# Patient Record
Sex: FEMALE | Race: WHITE | Employment: OTHER | URBAN - METROPOLITAN AREA
[De-identification: names, ages, dates, MRNs, and addresses within clinical notes are randomized per-mention and may not be internally consistent; named-entity substitution may affect disease eponyms.]

---

## 2017-01-18 ENCOUNTER — OFFICE VISIT (OUTPATIENT)
Dept: CARDIOLOGY CLINIC | Age: 77
End: 2017-01-18

## 2017-01-18 VITALS
WEIGHT: 119.8 LBS | SYSTOLIC BLOOD PRESSURE: 150 MMHG | HEIGHT: 63 IN | BODY MASS INDEX: 21.23 KG/M2 | HEART RATE: 66 BPM | RESPIRATION RATE: 16 BRPM | DIASTOLIC BLOOD PRESSURE: 72 MMHG

## 2017-01-18 DIAGNOSIS — I25.10 CORONARY ARTERY DISEASE INVOLVING NATIVE CORONARY ARTERY OF NATIVE HEART WITHOUT ANGINA PECTORIS: ICD-10-CM

## 2017-01-18 DIAGNOSIS — I65.29 STENOSIS OF CAROTID ARTERY, UNSPECIFIED LATERALITY: Primary | ICD-10-CM

## 2017-01-18 DIAGNOSIS — I49.3 PVC (PREMATURE VENTRICULAR CONTRACTION): ICD-10-CM

## 2017-01-18 DIAGNOSIS — I10 ESSENTIAL HYPERTENSION: ICD-10-CM

## 2017-01-18 RX ORDER — GEMFIBROZIL 600 MG/1
600 TABLET, FILM COATED ORAL 2 TIMES DAILY
Qty: 180 TAB | Refills: 11 | Status: SHIPPED | OUTPATIENT
Start: 2017-01-18 | End: 2017-10-18 | Stop reason: ALTCHOICE

## 2017-01-18 NOTE — MR AVS SNAPSHOT
Visit Information Date & Time Provider Department Dept. Phone Encounter #  
 1/18/2017 10:40 AM Eliza Harvey MD [facility] Jaren Tenorio [phone] [id number] Your Appointments 2/6/2017  8:30 AM  
ROUTINE CARE with MD Ofe Lopez [address]) Appt Note: bp chol ifg weight GERD  
 [address] [facility] [address] [address] [phone]  
  
   
 [address] [facility] [address] 4/19/2017  8:40 AM  
ESTABLISHED PATIENT with Eliza Harvey MD  
[facility] ([address]) Appt Note: 3 mfu  
 [address]  [address] [place] [address]  
[address] [address] Upcoming Health Maintenance Date Due Pneumococcal 65+ High/Highest Risk (1 of 2 - PCV13) 12/21/2004 INFLUENZA AGE 9 TO ADULT 8/1/2016 GLAUCOMA SCREENING Q2Y 11/26/2016 MEDICARE YEARLY EXAM 2/4/2017 DTaP/Tdap/Td series (2 - Td) 1/19/2025 COLONOSCOPY 1/28/2025 Allergies as of 1/18/2017  Review Complete On: 1/18/2017 By: Jason Nieto Severity Noted Reaction Type Reactions Lisinopril  09/27/2015    Other (comments) High K Losartan  02/19/2016    Other (comments) High K Current Immunizations  Reviewed on 8/4/2016 Name Date Pneumococcal Vaccine (Unspecified Type)  Deferred (Patient Refused) Tdap 1/19/2015 11:00 AM  
  
 Not reviewed this visit You Were Diagnosed With   
  
 Codes Comments Stenosis of carotid artery, unspecified laterality    -  Primary ICD-10-CM: I65.29 ICD-9-CM: 433.10 PVC (premature ventricular contraction)     ICD-10-CM: I49.3 ICD-9-CM: 427.69 Coronary artery disease involving native coronary artery of native heart without angina pectoris     ICD-10-CM: I25.10 ICD-9-CM: 414.01 Essential hypertension     ICD-10-CM: I10 
ICD-9-CM: 401.9 Vitals BP Pulse Resp Height(growth percentile) Weight(growth percentile) BMI  
 150/72 (BP 1 Location: Right arm, BP Patient Position: Sitting) 66 16 5' 3\" (1.6 m) 119 lb 12.8 oz (54.3 kg) 21.22 kg/m2 OB Status Smoking Status Postmenopausal Never Smoker Vitals History BMI and BSA Data Body Mass Index Body Surface Area  
 21.22 kg/m 2 1.55 m 2 Preferred Pharmacy Pharmacy Name Phone [facility] [place], [place] - [address] [place], [place] [address]maverick [phone] Your Updated Medication List  
  
   
This list is accurate as of: 1/18/17 11:41 AM.  Always use your most recent med list. amLODIPine 5 mg tablet Commonly known as:  Stewart Presser Take 1 Tab by mouth daily. aspirin 81 mg tablet Take 81 mg by mouth daily. carvedilol 6.25 mg tablet Commonly known as:  Rabia Buster Take 1 Tab by mouth two (2) times a day. clopidogrel 75 mg Tab Commonly known as:  PLAVIX Take 1 Tab by mouth daily. colesevelam 625 mg tablet Commonly known as:  WELCHOL  
TAKE TWO TABLETS BY MOUTH TWICE DAILY WITH FOOD  
  
 famotidine 20 mg tablet Commonly known as:  PEPCID  
TAKE ONE TABLET BY MOUTH TWICE DAILY  
  
 fenofibrate nanocrystallized 145 mg tablet Commonly known as:  TRICOR  
TAKE ONE TABLET BY MOUTH DAILY gemfibrozil 600 mg tablet Commonly known as:  LOPID Take 1 Tab by mouth two (2) times a day. latanoprost 0.005 % ophthalmic solution Commonly known as:  Deyanne Pace Administer 1 Drop to both eyes nightly. losartan 25 mg tablet Commonly known as:  COZAAR Take 0.5 Tabs by mouth daily. nicotinic acid 500 mg tablet Commonly known as:  NIACIN Take 500 mg by mouth daily (before breakfast). rosuvastatin 20 mg tablet Commonly known as:  CRESTOR Take 1 Tab by mouth nightly. VITAMIN D3 2,000 unit Tab Generic drug:  cholecalciferol (vitamin D3) Take 1 Tab by mouth daily. Prescriptions Sent to Pharmacy Refills  
 gemfibrozil (LOPID) 600 mg tablet 11 Sig: Take 1 Tab by mouth two (2) times a day. Class: Normal  
 Pharmacy: [address] #: [phone] Route: Oral  
  
We Performed the Following AMB POC EKG ROUTINE W/ 12 LEADS, INTER & REP [42179 CPT(R)] LIPID PANEL [71117 CPT(R)] MAGNESIUM [id number] CPT(R)] METABOLIC PANEL, COMPREHENSIVE [94183 CPT(R)] Please provide this summary of care documentation to your next provider. Your primary care clinician is listed as Jason Sinha. If you have any questions after today's visit, please call [phone].

## 2017-01-18 NOTE — PROGRESS NOTES
[facility]: Liv Daniel  [phone]    HPI: Kerry Hinton, a 67y.o. year-old who presents for follow up regarding her CAD. Bp is running a bit higher and she may be eating more salt but she is still walking and feeling good with that. Insurance wants to have her change to gemfibrozil. Discussed side effects, she will trial it and we will check her labs in 3 mos after she switches. Also will reeval her bp in three mos after she gets back to normal eating and cuts her salt. BP going up, 150 and 160 at times, will watch fornow and she will work on her diet, Recently has been more like 130-140 which is ok. Weight is stable. Her cholesterol looks a lot better but the welchol is very very expensive and she is taking one twice a day. Will recheck her labs and K and mag in 4/16. She continues to walk regularly without symptoms and feels well with it. Walks 30 min a day 5-6 days a week. No blood in the stool or in the urine    Old HDL labs:    HDL 75 Tg 78  High ApoB, LDLp SdLDL lpa >225!!!!!  Nl Lpla2, bnp 194, Normal aspirin works  Insulin normal, glucose 109, Homocysteine 19  Vit D 32  LFT/CMp normal    Assessment/Plan:  1. HTN-high today on losartan, coreg, amlodipine. Does not want diuretic due to already being dizzy. 2. CAD- stable, s/p TRANG RCA and midLAD 3/11   -Cath 8/12 with proximal RCA stent,  No chest pain now  -lifelong plavix and aspirin(her decision after discussion of risks and benefits)  3. Palpitations- better on coreg, still v-bigemeny at last holter  4. Hyperlipidemia- crestor 40, welchol, trilipix. Check for samples today  -no Zetia due to cost.   5. Anxiety- better since she stopped checking her bp. 6. Reflux- on Nexium, and stable  7. Vit D deficiency 2000u/day, continue, at goal now    8. Impaired glucose tolerance- a1C  Still good  9. CKD Stage 3-stable now, improved, seen by Dr. Mauro Chapman.   -hyperkalemia in the past improved with stopping spironolactone, 4.8 now, olegario cut losartan for same  10. Mild to moderate mitral regurgitation- by TTE 11/15    Echo 11/15 EF 60%, mild TR, PAP 25, mild-mod MR  Carotid US: Mod LICA plaque <63%, no evidence of Subclavian Steal.  Stress nuc: 1/14 normal, EF 69%  Echo 9/12 EF 60, mod MR, mild TR  Cath 2012 RCA stent  Fhx no early cad  Soc no tob, no etoh    She  has a past medical history of CAD (coronary artery disease); CKD (chronic kidney disease), stage III (9/26/2014); HTN (hypertension) (10/1/2010); Hyperlipidemia; and Hypertension. Cardiovascular ROS: positive for - chest pain  Respiratory ROS: negative for - cough, hemoptysis, pleuritic pain or shortness of breath  Neurological ROS: no TIA or stroke symptoms  All other systems negative except as above. PE  Vitals:    01/18/17 1054 01/18/17 1101   BP: 150/76 150/72   Pulse: 66    Resp: 16    Weight: 119 lb 12.8 oz (54.3 kg)    Height: 5' 3\" (1.6 m)       Body mass index is 21.22 kg/(m^2).    General appearance - alert, well appearing, and in no distress  Mental status - affect appropriate to mood  Eyes - sclera anicteric, moist mucous membranes  Neck - supple, no significant adenopathy  Lymphatics - no  lymphadenopathy  Chest - clear to auscultation, no wheezes, rales or rhonchi  Heart - normal rate, regular rhythm, normal S1, S2, no murmurs, rubs, clicks or gallops  Abdomen - soft, nontender, nondistended, no masses or organomegaly  Back exam - full range of motion, no tenderness  Neurological - cranial nerves II through XII grossly intact, no focal deficit  Musculoskeletal - no muscular tenderness noted, normal strength  Extremities - peripheral pulses normal, no pedal edema  Skin - normal coloration  no rashes    Recent Labs:  Lab Results   Component Value Date/Time    Cholesterol, total 187 08/15/2016 08:02 AM    HDL Cholesterol 78 08/15/2016 08:02 AM    LDL, calculated 97 08/15/2016 08:02 AM    Triglyceride 60 08/15/2016 08:02 AM    CHOL/HDL Ratio 3.1 09/28/2011 05:00 AM       Lab Results   Component Value Date/Time    Creatinine 1.16 08/15/2016 08:02 AM       Lab Results   Component Value Date/Time    BUN 24 08/15/2016 08:02 AM       Lab Results   Component Value Date/Time    Potassium 4.8 08/15/2016 08:02 AM       Lab Results   Component Value Date/Time    Hemoglobin A1c 6.1 08/15/2016 08:02 AM       Lab Results   Component Value Date/Time    HGB 12.8 06/13/2014 11:17 AM       Lab Results   Component Value Date/Time    PLATELET 423 24/88/8549 11:17 AM         Reviewed:  Past Medical History   Diagnosis Date    CAD (coronary artery disease)     CKD (chronic kidney disease), stage III 9/26/2014     Followed by Dr. 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  fenofibrate nanocrystallized (TRICOR) 145 mg tablet TAKE ONE TABLET BY MOUTH DAILY    rosuvastatin (CRESTOR) 20 mg tablet Take 1 Tab by mouth nightly.  colesevelam (WELCHOL) 625 mg tablet TAKE TWO TABLETS BY MOUTH TWICE DAILY WITH FOOD    losartan (COZAAR) 25 mg tablet Take 0.5 Tabs by mouth daily.  clopidogrel (PLAVIX) 75 mg tablet Take 1 Tab by mouth daily.  amLODIPine (NORVASC) 5 mg tablet Take 1 Tab by mouth daily.  carvedilol (COREG) 6.25 mg tablet Take 1 Tab by mouth two (2) times a day.  famotidine (PEPCID) 20 mg tablet TAKE ONE TABLET BY MOUTH TWICE DAILY    cholecalciferol, vitamin D3, (VITAMIN D3) 2,000 unit tab Take 1 Tab by mouth daily.  latanoprost (XALATAN) 0.005 % ophthalmic solution Administer 1 Drop to both eyes nightly.  nicotinic acid (NIACIN) 500 mg tablet Take 500 mg by mouth daily (before breakfast).  aspirin 81 mg tablet Take 81 mg by mouth daily. No current facility-administered medications for this visit.           Surinder Medrano MD  [facility] [place]  [address] [facility] [address]  [address]

## 2017-01-19 ENCOUNTER — TELEPHONE (OUTPATIENT)
Dept: CARDIOLOGY CLINIC | Age: 77
End: 2017-01-19

## 2017-01-19 NOTE — TELEPHONE ENCOUNTER
Patient called regarding new medication prescribed at last office visit. She is concerned about interactions.  Please give her a call at [phone]

## 2017-01-19 NOTE — TELEPHONE ENCOUNTER
Patient identified with 2 identifiers  Returned patient phone call, she is concerned about the cholesterol medications she is taking and the amount of mg in each medication. I explained that the comparisons are in different drug classes, so can't be compared across drug classes. I did review with her that if she has side effects with new medication, she needs to call back and we will review her medication. She states understanding.

## 2017-03-06 ENCOUNTER — OFFICE VISIT (OUTPATIENT)
Dept: INTERNAL MEDICINE CLINIC | Age: 77
End: 2017-03-06

## 2017-03-06 VITALS
HEIGHT: 63 IN | SYSTOLIC BLOOD PRESSURE: 152 MMHG | OXYGEN SATURATION: 96 % | DIASTOLIC BLOOD PRESSURE: 62 MMHG | TEMPERATURE: 97.9 F | RESPIRATION RATE: 16 BRPM | WEIGHT: 121 LBS | HEART RATE: 67 BPM | BODY MASS INDEX: 21.44 KG/M2

## 2017-03-06 DIAGNOSIS — R73.01 IFG (IMPAIRED FASTING GLUCOSE): ICD-10-CM

## 2017-03-06 DIAGNOSIS — K21.9 GASTROESOPHAGEAL REFLUX DISEASE WITHOUT ESOPHAGITIS: ICD-10-CM

## 2017-03-06 DIAGNOSIS — I25.10 CORONARY ARTERY DISEASE INVOLVING NATIVE CORONARY ARTERY OF NATIVE HEART WITHOUT ANGINA PECTORIS: ICD-10-CM

## 2017-03-06 DIAGNOSIS — I10 ESSENTIAL HYPERTENSION: Primary | ICD-10-CM

## 2017-03-06 RX ORDER — PREDNISOLONE ACETATE 10 MG/ML
1 SUSPENSION/ DROPS OPHTHALMIC DAILY
COMMUNITY
Start: 2017-02-27 | End: 2017-06-01

## 2017-03-06 RX ORDER — KETOROLAC TROMETHAMINE 5 MG/ML
SOLUTION OPHTHALMIC
COMMUNITY
Start: 2017-02-27 | End: 2018-02-01 | Stop reason: ALTCHOICE

## 2017-03-06 RX ORDER — OFLOXACIN 3 MG/ML
SOLUTION/ DROPS OPHTHALMIC
COMMUNITY
Start: 2017-02-27 | End: 2017-06-01

## 2017-03-06 NOTE — PROGRESS NOTES
HPI  Ms. Minerva Cano is a 67y.o. year old female, she is seen today for pre op prior to cataract surgery with Dr. Belem Bustillos. In usual state of health today. Says she may also get stent for glaucoma left eye. No cp, sob. Walks daily for 30 min without difficulty. METS > 4. No f/c, cough, n/v, constipation, diarrhea. No edema. No dizziness or lightheadedness. BP at home has been 133-148/74-86. HR 60-65. Chief Complaint   Patient presents with    Pre-op Exam     Room 1// cataract surgery 3/16/17 and 3/23/17// NON fasting     Annual Wellness Visit        Prior to Admission medications    Medication Sig Start Date End Date Taking? Authorizing Provider   gemfibrozil (LOPID) 600 mg tablet Take 1 Tab by mouth two (2) times a day. 1/18/17  Yes Graham Dukes MD   rosuvastatin (CRESTOR) 20 mg tablet Take 1 Tab by mouth nightly. 10/19/16  Yes Sugar Wilkes MD   [facility]) 625 mg tablet TAKE TWO TABLETS BY MOUTH TWICE DAILY WITH FOOD 7/27/16  Yes Graham Dukes MD   losartan (COZAAR) 25 mg tablet Take 0.5 Tabs by mouth daily. 7/27/16  Yes Graham Dukes MD   clopidogrel (PLAVIX) 75 mg tablet Take 1 Tab by mouth daily. 4/28/16  Yes Graham Dukes MD   amLODIPine (NORVASC) 5 mg tablet Take 1 Tab by mouth daily. 4/28/16  Yes Graham Dukes MD   carvedilol (COREG) 6.25 mg tablet Take 1 Tab by mouth two (2) times a day. 4/4/16  Yes Dorothy Dee NP   famotidine (PEPCID) 20 mg tablet TAKE ONE TABLET BY MOUTH TWICE DAILY 3/31/16  Yes Sugar Wilkes MD   cholecalciferol, vitamin D3, (VITAMIN D3) 2,000 unit tab Take 1 Tab by mouth daily. Yes Historical Provider   latanoprost (XALATAN) 0.005 % ophthalmic solution Administer 1 Drop to both eyes nightly. 12/6/13  Yes Historical Provider   nicotinic acid (NIACIN) 500 mg tablet Take 500 mg by mouth daily (before breakfast). Yes Historical Provider   aspirin 81 mg tablet Take 81 mg by mouth daily. Yes Historical Provider   prednisoLONE acetate (PRED FORTE) 1 % ophthalmic suspension  2/27/17   Historical Provider   ofloxacin (FLOXIN) 0.3 % ophthalmic solution  2/27/17   Historical Provider   ketorolac (ACULAR) 0.5 % ophthalmic solution  2/27/17   Historical Provider         Allergies   Allergen Reactions    Lisinopril Other (comments)     High K         REVIEW OF SYSTEMS:  Per HPI    PHYSICAL EXAM:  Visit Vitals    /62    Pulse 67    Temp 97.9 °F (36.6 °C)    Resp 16    Ht 5' 3\" (1.6 m)    Wt 121 lb (54.9 kg)    SpO2 96%    BMI 21.43 kg/m2     Constitutional: Appears well-developed and well-nourished. No distress. HENT:   Head: Normocephalic and atraumatic. Eyes: No scleral icterus. Cardiovascular: Normal S1/S2, regular rhythm. No murmurs, rubs, or gallops. Pulmonary/Chest: Effort normal and breath sounds normal. No respiratory distress. No wheezes, rhonchi, or rales. Ext: No edema. Neurological: Alert. Psychiatric: Normal mood and affect. Behavior is normal.     Lab Results   Component Value Date/Time    Sodium 141 08/15/2016 08:02 AM    Potassium 4.8 08/15/2016 08:02 AM    Chloride 102 08/15/2016 08:02 AM    CO2 24 08/15/2016 08:02 AM    Anion gap 9 01/17/2014 05:49 PM    Glucose 105 08/15/2016 08:02 AM    BUN 24 08/15/2016 08:02 AM    Creatinine 1.16 08/15/2016 08:02 AM    BUN/Creatinine ratio 21 08/15/2016 08:02 AM    GFR est AA 53 08/15/2016 08:02 AM    GFR est non-AA 46 08/15/2016 08:02 AM    Calcium 9.3 08/15/2016 08:02 AM    Bilirubin, total 0.3 08/15/2016 08:02 AM    AST (SGOT) 19 08/15/2016 08:02 AM    Alk.  phosphatase 57 08/15/2016 08:02 AM    Protein, total 6.4 08/15/2016 08:02 AM    Albumin 4.3 08/15/2016 08:02 AM    Globulin 4.1 01/17/2014 05:49 PM    A-G Ratio 2.0 08/15/2016 08:02 AM    ALT (SGPT) 12 08/15/2016 08:02 AM     Lab Results   Component Value Date/Time    Hemoglobin A1c 6.1 08/15/2016 08:02 AM    Hemoglobin A1c 5.9 02/09/2016 09:27 AM    Hemoglobin A1c 6.1 08/05/2015 11:06 AM      Lab Results   Component Value Date/Time    Cholesterol, total 187 08/15/2016 08:02 AM    HDL Cholesterol 78 08/15/2016 08:02 AM    LDL, calculated 97 08/15/2016 08:02 AM    VLDL, calculated 12 08/15/2016 08:02 AM    Triglyceride 60 08/15/2016 08:02 AM    CHOL/HDL Ratio 3.1 09/28/2011 05:00 AM          ASSESSMENT/PLAN  Ever Burger was seen today for pre-op exam and annual wellness visit. Diagnoses and all orders for this visit:    Based on ACC/AHA guidelines patient may proceed to OR for cataract surgery. If plavix/asa need to be stopped may be stopped temporarily as patient is several years s/p stent placement. Essential hypertension    Coronary artery disease involving native coronary artery of native heart without angina pectoris    Gastroesophageal reflux disease without esophagitis    IFG (impaired fasting glucose)        Keep already scheduled follow up. Health Maintenance Due   Topic Date Due    GLAUCOMA SCREENING Q2Y  11/26/2016    MEDICARE YEARLY EXAM  02/04/2017        Follow-up Disposition: Not on File       Reviewed plan of care. Patient has provided input and agrees with goals. The nurse provided the patient and/or family with advanced directive information if needed and encouraged the patient to provide a copy to the office when available.

## 2017-03-06 NOTE — MR AVS SNAPSHOT
Visit Information Date & Time Provider Department Dept. Phone Encounter #  
 3/6/2017  1:35 PM MD Kenzie Brown [phone] [id number] Follow-up Instructions Routing History Your Appointments 4/19/2017  8:40 AM  
ESTABLISHED PATIENT with Charlotte Kowalski MD  
[facility] ([address]) Appt Note: 3 mfu  
 [address] [place] [address]  
[address] [place] [address]  
  
    
 6/1/2017  2:30 PM  
ROUTINE CARE with MD Kenzie Brown [address]) Appt Note: bp chol ifg weight GERD; bp chol ifg weight GERD/$0CP KMP 1/19/17  
 [address] [address] [phone]  
  
   
 [address] [facility] [address] Upcoming Health Maintenance Date Due  
 GLAUCOMA SCREENING Q2Y 11/26/2016 MEDICARE YEARLY EXAM 2/4/2017 Pneumococcal 65+ High/Highest Risk (2 of 2 - PPSV23) 5/1/2017 DTaP/Tdap/Td series (2 - Td) 1/19/2025 COLONOSCOPY 1/28/2025 Allergies as of 3/6/2017  Review Complete On: 3/6/2017 By: Abraham Rosales MD  
  
 Severity Noted Reaction Type Reactions Lisinopril  09/27/2015    Other (comments) High K Losartan  02/19/2016    Other (comments) High K Current Immunizations  Reviewed on 3/6/2017 Name Date Pneumococcal Vaccine (Unspecified Type)  Deferred (Patient Refused) Tdap 1/19/2015 11:00 AM  
  
 Reviewed by Abraham Rosales MD on 3/6/2017 at  2:01 PM  
You Were Diagnosed With   
  
 Codes Comments Essential hypertension    -  Primary ICD-10-CM: I10 
ICD-9-CM: 401.9 Coronary artery disease involving native coronary artery of native heart without angina pectoris     ICD-10-CM: I25.10 ICD-9-CM: 414.01 Gastroesophageal reflux disease without esophagitis     ICD-10-CM: K21.9 ICD-9-CM: 530.81   
 IFG (impaired fasting glucose)     ICD-10-CM: R73.01 
ICD-9-CM: 790.21 Vitals BP Pulse Temp Resp Height(growth percentile) Weight(growth percentile) 152/62 67 97.9 °F (36.6 °C) 16 5' 3\" (1.6 m) 121 lb (54.9 kg) SpO2 BMI OB Status Smoking Status 96% 21.43 kg/m2 Postmenopausal Never Smoker Vitals History BMI and BSA Data Body Mass Index Body Surface Area  
 21.43 kg/m 2 1.56 m 2 Preferred Pharmacy Pharmacy Name Phone [facility] [place], [place] - [address] [place] [address] [place] [phone] Your Updated Medication List  
  
   
This list is accurate as of: 3/6/17  2:19 PM.  Always use your most recent med list. amLODIPine 5 mg tablet Commonly known as:  Anup Haven Take 1 Tab by mouth daily. aspirin 81 mg tablet Take 81 mg by mouth daily. carvedilol 6.25 mg tablet Commonly known as:  Zack Peat Take 1 Tab by mouth two (2) times a day. clopidogrel 75 mg Tab Commonly known as:  PLAVIX Take 1 Tab by mouth daily. colesevelam 625 mg tablet Commonly known as:  WELCHOL  
TAKE TWO TABLETS BY MOUTH TWICE DAILY WITH FOOD  
  
 famotidine 20 mg tablet Commonly known as:  PEPCID  
TAKE ONE TABLET BY MOUTH TWICE DAILY gemfibrozil 600 mg tablet Commonly known as:  LOPID Take 1 Tab by mouth two (2) times a day.  
  
 ketorolac 0.5 % ophthalmic solution Commonly known as:  ACULAR  
  
 latanoprost 0.005 % ophthalmic solution Commonly known as:  Darcy Correa Administer 1 Drop to both eyes nightly. losartan 25 mg tablet Commonly known as:  COZAAR Take 0.5 Tabs by mouth daily. nicotinic acid 500 mg tablet Commonly known as:  NIACIN Take 500 mg by mouth daily (before breakfast). ofloxacin 0.3 % ophthalmic solution Commonly known as:  FLOXIN  
  
 prednisoLONE acetate 1 % ophthalmic suspension Commonly known as:  PRED FORTE  
  
 rosuvastatin 20 mg tablet Commonly known as:  CRESTOR  
 Take 1 Tab by mouth nightly. VITAMIN D3 2,000 unit Tab Generic drug:  cholecalciferol (vitamin D3) Take 1 Tab by mouth daily. Please provide this summary of care documentation to your next provider. Your primary care clinician is listed as Jason Sinha. If you have any questions after today's visit, please call [phone].

## 2017-03-06 NOTE — PROGRESS NOTES
Patient received paperwork for advance directive during previous visit but has not completed at this time     Reviewed record In preparation for visit and have obtained necessary documentation      1. Have you been to the ER, urgent care clinic since your last visit? Hospitalized since your last visit? NO    2. Have you seen or consulted any other health care providers outside of the [address] [place] [address] since your last visit? Include any pap smears or colon screening.  NO

## 2017-04-03 RX ORDER — FAMOTIDINE 20 MG/1
TABLET, FILM COATED ORAL
Qty: 60 TAB | Refills: 0 | Status: SHIPPED | OUTPATIENT
Start: 2017-04-03 | End: 2017-04-24 | Stop reason: SDUPTHER

## 2017-04-14 ENCOUNTER — HOSPITAL ENCOUNTER (OUTPATIENT)
Dept: LAB | Age: 77
Discharge: HOME | End: 2017-04-14
Payer: MEDICARE

## 2017-04-14 PROCEDURE — 83735 ASSAY OF MAGNESIUM: CPT

## 2017-04-14 PROCEDURE — 80061 LIPID PANEL: CPT

## 2017-04-14 PROCEDURE — 36415 COLL VENOUS BLD VENIPUNCTURE: CPT

## 2017-04-14 PROCEDURE — 80053 COMPREHEN METABOLIC PANEL: CPT

## 2017-04-15 LAB
ALBUMIN SERPL-MCNC: 4.3 G/DL (ref 3.5–4.8)
ALBUMIN/GLOB SERPL: 1.9 {RATIO} (ref 1.2–2.2)
ALP SERPL-CCNC: 101 IU/L (ref 39–117)
ALT SERPL-CCNC: 17 IU/L (ref 0–32)
AST SERPL-CCNC: 19 IU/L (ref 0–40)
BILIRUB SERPL-MCNC: 0.5 MG/DL (ref 0–1.2)
BUN SERPL-MCNC: 22 MG/DL (ref 8–27)
BUN/CREAT SERPL: 28 (ref 12–28)
CALCIUM SERPL-MCNC: 9.8 MG/DL (ref 8.7–10.3)
CHLORIDE SERPL-SCNC: 105 MMOL/L (ref 96–106)
CHOLEST SERPL-MCNC: 214 MG/DL (ref 100–199)
CO2 SERPL-SCNC: 19 MMOL/L (ref 18–29)
CREAT SERPL-MCNC: 0.79 MG/DL (ref 0.57–1)
GLOBULIN SER CALC-MCNC: 2.3 G/DL (ref 1.5–4.5)
GLUCOSE SERPL-MCNC: 103 MG/DL (ref 65–99)
HDLC SERPL-MCNC: 108 MG/DL
INTERPRETATION, 910389: NORMAL
LDLC SERPL CALC-MCNC: 97 MG/DL (ref 0–99)
MAGNESIUM SERPL-MCNC: 1.9 MG/DL (ref 1.6–2.3)
POTASSIUM SERPL-SCNC: 5.3 MMOL/L (ref 3.5–5.2)
PROT SERPL-MCNC: 6.6 G/DL (ref 6–8.5)
SODIUM SERPL-SCNC: 144 MMOL/L (ref 134–144)
TRIGL SERPL-MCNC: 47 MG/DL (ref 0–149)
VLDLC SERPL CALC-MCNC: 9 MG/DL (ref 5–40)

## 2017-04-19 ENCOUNTER — OFFICE VISIT (OUTPATIENT)
Dept: CARDIOLOGY CLINIC | Age: 77
End: 2017-04-19

## 2017-04-19 VITALS
HEART RATE: 66 BPM | DIASTOLIC BLOOD PRESSURE: 64 MMHG | BODY MASS INDEX: 21.55 KG/M2 | WEIGHT: 121.6 LBS | HEIGHT: 63 IN | RESPIRATION RATE: 16 BRPM | SYSTOLIC BLOOD PRESSURE: 120 MMHG

## 2017-04-19 DIAGNOSIS — E78.5 OTHER AND UNSPECIFIED HYPERLIPIDEMIA: ICD-10-CM

## 2017-04-19 DIAGNOSIS — I10 ESSENTIAL HYPERTENSION: ICD-10-CM

## 2017-04-19 DIAGNOSIS — I49.3 PVC (PREMATURE VENTRICULAR CONTRACTION): ICD-10-CM

## 2017-04-19 DIAGNOSIS — I25.10 CORONARY ARTERY DISEASE INVOLVING NATIVE CORONARY ARTERY OF NATIVE HEART WITHOUT ANGINA PECTORIS: ICD-10-CM

## 2017-04-19 DIAGNOSIS — I65.29 STENOSIS OF CAROTID ARTERY, UNSPECIFIED LATERALITY: Primary | ICD-10-CM

## 2017-04-19 RX ORDER — CLOPIDOGREL BISULFATE 75 MG/1
75 TABLET ORAL DAILY
Qty: 90 TAB | Refills: 3 | Status: SHIPPED | OUTPATIENT
Start: 2017-04-19 | End: 2018-04-02 | Stop reason: SDUPTHER

## 2017-04-19 RX ORDER — AMLODIPINE BESYLATE 5 MG/1
5 TABLET ORAL DAILY
Qty: 90 TAB | Refills: 3 | Status: SHIPPED | OUTPATIENT
Start: 2017-04-19 | End: 2018-04-18 | Stop reason: SDUPTHER

## 2017-04-19 NOTE — PROGRESS NOTES
[facility]: Vani Soto  [phone]    HPI: Mihaela Mata, a 67y.o. year-old who presents for follow up regarding her CAD. Feels well, BP looks great. Eating better, less salt, walking more. Had ome joint stiffness and cut gemfibrozil to once a day, labs look good. K is 5.3, she will recheck that with Dr. Moody Amato in three months. She continues to walk regularly without symptoms and feels well with it. Walks 30 min a day 5-6 days a week. No blood in the stool or in the urine    Old HDL labs:    HDL 75 Tg 78  High ApoB, LDLp SdLDL lpa >225!!!!!  Nl Lpla2, bnp 194, Normal aspirin works  Insulin normal, glucose 109, Homocysteine 19  Vit D 32  LFT/CMp normal    Assessment/Plan:  1. HTN-high today on losartan, coreg, amlodipine. Does not want diuretic due to already being dizzy.   -watch K on losartan  2. CAD- stable, s/p TRANG RCA and midLAD 3/11   -Cath 8/12 with proximal RCA stent,  No chest pain now  -lifelong plavix and aspirin(her decision after discussion of risks and benefits)  3. Palpitations- better on coreg, still v-bigemeny at last holter  4. Hyperlipidemia- crestor 40, welchol, trilipix. Check for samples today  -no Zetia due to cost.   5. Anxiety- better since she stopped checking her bp. 6. Reflux- on Nexium, and stable  7. Vit D deficiency 2000u/day, continue, at goal now    8. Impaired glucose tolerance- a1C  Still good  9. CKD Stage 3-stable now, improved, seen by Dr. Daphne Pineda. -hyperkalemia in the past improved with stopping spironolactone, 4.8 now, also cut losartan for same  10.  Mild to moderate mitral regurgitation- by TTE 11/15    Echo 11/15 EF 60%, mild TR, PAP 25, mild-mod MR  Carotid US: Mod LICA plaque <05%, no evidence of Subclavian Steal.  Stress nuc: 1/14 normal, EF 69%  Echo 9/12 EF 60, mod MR, mild TR  Cath 2012 RCA stent  Fhx no early cad  Soc no tob, no etoh    She  has a past medical history of CAD (coronary artery disease); CKD (chronic kidney disease), stage III (9/26/2014); HTN (hypertension) (10/1/2010); Hyperlipidemia; and Hypertension. Cardiovascular ROS: positive for - chest pain  Respiratory ROS: negative for - cough, hemoptysis, pleuritic pain or shortness of breath  Neurological ROS: no TIA or stroke symptoms  All other systems negative except as above. PE  Vitals:    04/19/17 0859   BP: 120/64   Pulse: 66   Resp: 16   Weight: 121 lb 9.6 oz (55.2 kg)   Height: 5' 3\" (1.6 m)      Body mass index is 21.54 kg/(m^2).    General appearance - alert, well appearing, and in no distress  Mental status - affect appropriate to mood  Eyes - sclera anicteric, moist mucous membranes  Neck - supple, no significant adenopathy  Lymphatics - no  lymphadenopathy  Chest - clear to auscultation, no wheezes, rales or rhonchi  Heart - normal rate, regular rhythm, normal S1, S2, no murmurs, rubs, clicks or gallops  Abdomen - soft, nontender, nondistended, no masses or organomegaly  Back exam - full range of motion, no tenderness  Neurological - cranial nerves II through XII grossly intact, no focal deficit  Musculoskeletal - no muscular tenderness noted, normal strength  Extremities - peripheral pulses normal, no pedal edema  Skin - normal coloration  no rashes    Recent Labs:  Lab Results   Component Value Date/Time    Cholesterol, total 214 04/14/2017 09:32 AM    HDL Cholesterol 108 04/14/2017 09:32 AM    LDL, calculated 97 04/14/2017 09:32 AM    Triglyceride 47 04/14/2017 09:32 AM    CHOL/HDL Ratio 3.1 09/28/2011 05:00 AM       Lab Results   Component Value Date/Time    Creatinine 0.79 04/14/2017 09:32 AM       Lab Results   Component Value Date/Time    BUN 22 04/14/2017 09:32 AM       Lab Results   Component Value Date/Time    Potassium 5.3 04/14/2017 09:32 AM       Lab Results   Component Value Date/Time    Hemoglobin A1c 6.1 08/15/2016 08:02 AM       Lab Results   Component Value Date/Time    HGB 12.8 06/13/2014 11:17 AM       Lab Results   Component Value Date/Time    PLATELET 896 28/48/5448 11:17 AM         Reviewed:  Past Medical History:   Diagnosis Date    CAD (coronary artery disease)     CKD (chronic kidney disease), stage III 9/26/2014    Followed by Dr. 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  famotidine (PEPCID) 20 mg tablet TAKE ONE TABLET BY MOUTH TWICE DAILY    prednisoLONE acetate (PRED FORTE) 1 % ophthalmic suspension Administer 1 Drop to left eye daily.  gemfibrozil (LOPID) 600 mg tablet Take 1 Tab by mouth two (2) times a day.  rosuvastatin (CRESTOR) 20 mg tablet Take 1 Tab by mouth nightly.  colesevelam (WELCHOL) 625 mg tablet TAKE TWO TABLETS BY MOUTH TWICE DAILY WITH FOOD    losartan (COZAAR) 25 mg tablet Take 0.5 Tabs by mouth daily.  clopidogrel (PLAVIX) 75 mg tablet Take 1 Tab by mouth daily.  amLODIPine (NORVASC) 5 mg tablet Take 1 Tab by mouth daily.  cholecalciferol, vitamin D3, (VITAMIN D3) 2,000 unit tab Take 1 Tab by mouth daily.  latanoprost (XALATAN) 0.005 % ophthalmic solution Administer 1 Drop to both eyes nightly.  nicotinic acid (NIACIN) 500 mg tablet Take 500 mg by mouth daily (before breakfast).  aspirin 81 mg tablet Take 81 mg by mouth daily.  ofloxacin (FLOXIN) 0.3 % ophthalmic solution     ketorolac (ACULAR) 0.5 % ophthalmic solution      No current facility-administered medications for this visit.           Juan Bates MD  [facility] and [facility] [place]  [address] [facility]  [address]

## 2017-04-19 NOTE — MR AVS SNAPSHOT
Visit Information Date & Time Provider Department Dept. Phone Encounter #  
 4/19/2017  8:40 AM Cristy Izquierdo MD [facility] Orellana [phone] [id number] Your Appointments 6/1/2017  2:30 PM  
ROUTINE CARE with MD Laverne Marx [facility] [address]) Appt Note: bp chol ifg weight GERD; bp chol ifg weight GERD/$0CP KMP 1/19/17  
 [address] [place] [address] [phone]  
  
   
 [address] [place] [address]  
  
    
 10/18/2017 10:00 AM  
ESTABLISHED PATIENT with Cristy Izquierdo MD  
[facility] ([address]) Appt Note: 6 month follow up  
 [address] [place] [address] [phone] [facility],[address] [facility] [phone] Upcoming Health Maintenance Date Due Pneumococcal 65+ Low/Medium Risk (1 of 2 - PCV13) 12/21/2004 MEDICARE YEARLY EXAM 2/4/2017 GLAUCOMA SCREENING Q2Y 2/27/2019 DTaP/Tdap/Td series (2 - Td) 1/19/2025 COLONOSCOPY 1/28/2025 Allergies as of 4/19/2017  Review Complete On: 4/19/2017 By: Nick Mejias Severity Noted Reaction Type Reactions Lisinopril  09/27/2015    Other (comments) High K Current Immunizations  Reviewed on 3/6/2017 Name Date Pneumococcal Vaccine (Unspecified Type)  Deferred (Patient Refused) Tdap 1/19/2015 11:00 AM  
  
 Not reviewed this visit You Were Diagnosed With   
  
 Codes Comments Stenosis of carotid artery, unspecified laterality    -  Primary ICD-10-CM: I65.29 ICD-9-CM: 433.10 PVC (premature ventricular contraction)     ICD-10-CM: I49.3 ICD-9-CM: 427.69 Other and unspecified hyperlipidemia     ICD-10-CM: E78.5 ICD-9-CM: 272.4 Coronary artery disease involving native coronary artery of native heart without angina pectoris     ICD-10-CM: I25.10 ICD-9-CM: 414.01   
 Essential hypertension     ICD-10-CM: I10 
ICD-9-CM: 401.9 Vitals BP Pulse Resp Height(growth percentile) Weight(growth percentile) BMI  
 120/64 (BP 1 Location: Right arm, BP Patient Position: Sitting) 66 16 5' 3\" (1.6 m) 121 lb 9.6 oz (55.2 kg) 21.54 kg/m2 OB Status Smoking Status Postmenopausal Never Smoker Vitals History BMI and BSA Data Body Mass Index Body Surface Area  
 21.54 kg/m 2 1.57 m 2 Preferred Pharmacy Pharmacy Name Phone [facility] [address] [place], [place] - [address] [place] [address] Crys Mooney [phone] Your Updated Medication List  
  
   
This list is accurate as of: 4/19/17  9:26 AM.  Always use your most recent med list. amLODIPine 5 mg tablet Commonly known as:  Avis Alexey Take 1 Tab by mouth daily. aspirin 81 mg tablet Take 81 mg by mouth daily. carvedilol 6.25 mg tablet Commonly known as:  Merry Schimke Take 1 Tab by mouth two (2) times a day. clopidogrel 75 mg Tab Commonly known as:  PLAVIX Take 1 Tab by mouth daily. colesevelam 625 mg tablet Commonly known as:  WELCHOL  
TAKE TWO TABLETS BY MOUTH TWICE DAILY WITH FOOD  
  
 famotidine 20 mg tablet Commonly known as:  PEPCID  
TAKE ONE TABLET BY MOUTH TWICE DAILY gemfibrozil 600 mg tablet Commonly known as:  LOPID Take 1 Tab by mouth two (2) times a day.  
  
 ketorolac 0.5 % ophthalmic solution Commonly known as:  ACULAR  
  
 latanoprost 0.005 % ophthalmic solution Commonly known as:  Delsie Helena Administer 1 Drop to both eyes nightly. losartan 25 mg tablet Commonly known as:  COZAAR Take 0.5 Tabs by mouth daily. ofloxacin 0.3 % ophthalmic solution Commonly known as:  FLOXIN  
  
 prednisoLONE acetate 1 % ophthalmic suspension Commonly known as:  PRED FORTE Administer 1 Drop to left eye daily. rosuvastatin 20 mg tablet Commonly known as:  CRESTOR Take 1 Tab by mouth nightly. VITAMIN D3 2,000 unit Tab Generic drug:  cholecalciferol (vitamin D3) Take 1 Tab by mouth daily. We Performed the Following AMB POC EKG ROUTINE W/ 12 LEADS, INTER & REP [48659 CPT(R)] Introducing [facility] SERVICES! Dear Kacy Munoz: Thank you for requesting a [employer] account. Our records indicate that you have previously registered for a [employer] account but its currently inactive. Please call our [employer] support line at [phone]. Additional Information If you have questions, please visit the Frequently Asked Questions section of the [employer] website at [email or web address] Remember, [employer] is NOT to be used for urgent needs. For medical emergencies, dial 911. Now available from your iPhone and Android! Please provide this summary of care documentation to your next provider. Your primary care clinician is listed as Jason Sinha. If you have any questions after today's visit, please call [phone].

## 2017-04-25 RX ORDER — FAMOTIDINE 20 MG/1
20 TABLET, FILM COATED ORAL 2 TIMES DAILY
Qty: 180 TAB | Refills: 0 | Status: SHIPPED | OUTPATIENT
Start: 2017-04-25 | End: 2017-07-31 | Stop reason: SDUPTHER

## 2017-06-01 ENCOUNTER — HOSPITAL ENCOUNTER (OUTPATIENT)
Dept: LAB | Age: 77
Discharge: HOME | End: 2017-06-01
Payer: MEDICARE

## 2017-06-01 ENCOUNTER — OFFICE VISIT (OUTPATIENT)
Dept: INTERNAL MEDICINE CLINIC | Age: 77
End: 2017-06-01

## 2017-06-01 VITALS
RESPIRATION RATE: 14 BRPM | HEIGHT: 63 IN | OXYGEN SATURATION: 98 % | HEART RATE: 65 BPM | SYSTOLIC BLOOD PRESSURE: 166 MMHG | WEIGHT: 122 LBS | TEMPERATURE: 97.9 F | BODY MASS INDEX: 21.62 KG/M2 | DIASTOLIC BLOOD PRESSURE: 90 MMHG

## 2017-06-01 DIAGNOSIS — Z00.00 ROUTINE GENERAL MEDICAL EXAMINATION AT A HEALTH CARE FACILITY: Primary | ICD-10-CM

## 2017-06-01 DIAGNOSIS — Z13.39 SCREENING FOR ALCOHOLISM: ICD-10-CM

## 2017-06-01 DIAGNOSIS — N18.30 CKD (CHRONIC KIDNEY DISEASE), STAGE III (HCC): ICD-10-CM

## 2017-06-01 DIAGNOSIS — K21.9 GASTROESOPHAGEAL REFLUX DISEASE WITHOUT ESOPHAGITIS: ICD-10-CM

## 2017-06-01 DIAGNOSIS — R73.01 IFG (IMPAIRED FASTING GLUCOSE): ICD-10-CM

## 2017-06-01 DIAGNOSIS — I10 ESSENTIAL HYPERTENSION: ICD-10-CM

## 2017-06-01 DIAGNOSIS — Z13.31 SCREENING FOR DEPRESSION: ICD-10-CM

## 2017-06-01 DIAGNOSIS — E78.5 HYPERLIPIDEMIA, UNSPECIFIED HYPERLIPIDEMIA TYPE: ICD-10-CM

## 2017-06-01 PROCEDURE — 83036 HEMOGLOBIN GLYCOSYLATED A1C: CPT

## 2017-06-01 PROCEDURE — 36415 COLL VENOUS BLD VENIPUNCTURE: CPT

## 2017-06-01 PROCEDURE — 80053 COMPREHEN METABOLIC PANEL: CPT

## 2017-06-01 RX ORDER — GLUCOSAMINE/CHONDR SU A SOD 167-133 MG
500 CAPSULE ORAL
COMMUNITY
End: 2018-04-18 | Stop reason: ALTCHOICE

## 2017-06-01 NOTE — PROGRESS NOTES
HPI  Ms. Ricky Dumont is a 67y.o. year old female, she is seen today for follow up HTN, high cholesterol. Checks bp at home - normally 141/85, 131/71, 142/80. Still some breakthrough GERD symptoms, not consistently. No sob. Walks daily for exercise. May get sob at times, not with exertion. Not changed. No dizziness or lightheadedness. May get dizzy sometimes, rare. Chief Complaint   Patient presents with    Blood Pressure Check     Room 2// Fasting     Cholesterol Problem    Annual Wellness Visit        Prior to Admission medications    Medication Sig Start Date End Date Taking? Authorizing Provider   nicotinic acid (NIACIN) 500 mg tablet Take 500 mg by mouth Daily (before breakfast). Yes Historical Provider   famotidine (PEPCID) 20 mg tablet Take 1 Tab by mouth two (2) times a day. 4/25/17  Yes Radha Jensen MD   amLODIPine (NORVASC) 5 mg tablet Take 1 Tab by mouth daily. 4/19/17  Yes Shane Peguero MD   clopidogrel (PLAVIX) 75 mg tab Take 1 Tab by mouth daily. 4/19/17  Yes Shane Peguero MD   carvedilol (COREG) 6.25 mg tablet Take 1 Tab by mouth two (2) times a day. 4/10/17  Yes Shane Peguero MD   gemfibrozil (LOPID) 600 mg tablet Take 1 Tab by mouth two (2) times a day. 1/18/17  Yes Shane Peguero MD   rosuvastatin (CRESTOR) 20 mg tablet Take 1 Tab by mouth nightly. 10/19/16  Yes Radha Jensen MD   [facility]) 625 mg tablet TAKE TWO TABLETS BY MOUTH TWICE DAILY WITH FOOD 7/27/16  Yes Shane Peguero MD   losartan (COZAAR) 25 mg tablet Take 0.5 Tabs by mouth daily. 7/27/16  Yes Shane Peguero MD   cholecalciferol, vitamin D3, (VITAMIN D3) 2,000 unit tab Take 1 Tab by mouth daily. Yes Historical Provider   aspirin 81 mg tablet Take 81 mg by mouth daily.    Yes Historical Provider   ketorolac (ACULAR) 0.5 % ophthalmic solution  2/27/17   Historical Provider         Allergies   Allergen Reactions    Lisinopril Other (comments)     High K REVIEW OF SYSTEMS:  Per HPI    PHYSICAL EXAM:  Visit Vitals    /90    Pulse 65    Temp 97.9 °F (36.6 °C) (Oral)    Resp 14    Ht 5' 3\" (1.6 m)    Wt 122 lb (55.3 kg)    SpO2 98%    BMI 21.61 kg/m2     Constitutional: Appears well-developed and well-nourished. No distress. HENT:   Head: Normocephalic and atraumatic. Eyes: No scleral icterus. Cardiovascular: Normal S1/S2, regular rhythm. No murmurs, rubs, or gallops. Pulmonary/Chest: Effort normal and breath sounds normal. No respiratory distress. No wheezes, rhonchi, or rales. Abdomen: Soft, NT/ND, +BS, no rebound or guarding, no masses, no HSM appreciated. Ext: No edema. Neurological: Alert. Psychiatric: Normal mood and affect. Behavior is normal.     Lab Results   Component Value Date/Time    Sodium 141 06/01/2017 03:21 PM    Potassium 4.8 06/01/2017 03:21 PM    Chloride 103 06/01/2017 03:21 PM    CO2 23 06/01/2017 03:21 PM    Anion gap 9 01/17/2014 05:49 PM    Glucose 89 06/01/2017 03:21 PM    BUN 24 06/01/2017 03:21 PM    Creatinine 0.75 06/01/2017 03:21 PM    BUN/Creatinine ratio 32 06/01/2017 03:21 PM    GFR est AA 89 06/01/2017 03:21 PM    GFR est non-AA 77 06/01/2017 03:21 PM    Calcium 9.7 06/01/2017 03:21 PM    Bilirubin, total 0.3 06/01/2017 03:21 PM    AST (SGOT) 18 06/01/2017 03:21 PM    Alk.  phosphatase 102 06/01/2017 03:21 PM    Protein, total 6.8 06/01/2017 03:21 PM    Albumin 4.4 06/01/2017 03:21 PM    Globulin 4.1 01/17/2014 05:49 PM    A-G Ratio 1.8 06/01/2017 03:21 PM    ALT (SGPT) 10 06/01/2017 03:21 PM     Lab Results   Component Value Date/Time    Hemoglobin A1c 5.9 06/01/2017 03:21 PM    Hemoglobin A1c 6.1 08/15/2016 08:02 AM    Hemoglobin A1c 5.9 02/09/2016 09:27 AM      Lab Results   Component Value Date/Time    Cholesterol, total 214 04/14/2017 09:32 AM    HDL Cholesterol 108 04/14/2017 09:32 AM    LDL, calculated 97 04/14/2017 09:32 AM    VLDL, calculated 9 04/14/2017 09:32 AM    Triglyceride 47 04/14/2017 09:32 AM    CHOL/HDL Ratio 3.1 09/28/2011 05:00 AM          ASSESSMENT/PLAN  Garcianikolas Flores was seen today for blood pressure check, cholesterol problem and annual wellness visit. Diagnoses and all orders for this visit:    Routine general medical examination at a health care facility    Essential hypertension  -     METABOLIC PANEL, COMPREHENSIVE  Slightly high today - BP better controlled at home - continue current medications     Gastroesophageal reflux disease without esophagitis  Continue famotidine    IFG (impaired fasting glucose)  -     HEMOGLOBIN A1C WITH EAG    CKD (chronic kidney disease), stage III  Check labs - creatinine has improved   Hyperlipidemia, unspecified hyperlipidemia type  Last lipids excellent    Screening for alcoholism    Screening for depression  -     Depression Screen Annual          There are no preventive care reminders to display for this patient. Follow-up Disposition:  Return in about 6 months (around 12/1/2017) for bp, chol, ifg.       Reviewed plan of care. Patient has provided input and agrees with goals. The nurse provided the patient and/or family with advanced directive information if needed and encouraged the patient to provide a copy to the office when available. This is a Subsequent Medicare Annual Wellness Visit providing Personalized Prevention Plan Services (PPPS) (Performed 12 months after initial AWV and PPPS )    I have reviewed the patient's medical history in detail and updated the computerized patient record.      History     Past Medical History:   Diagnosis Date    CAD (coronary artery disease)     CKD (chronic kidney disease), stage III 9/26/2014    Followed by Dr. 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 Kim Napoles HX TONSILLECTOMY       Current Outpatient Prescriptions   Medication Sig Dispense Refill    nicotinic acid (NIACIN) 500 mg tablet Take 500 mg by mouth Daily (before breakfast).  famotidine (PEPCID) 20 mg tablet Take 1 Tab by mouth two (2) times a day. 180 Tab 0    amLODIPine (NORVASC) 5 mg tablet Take 1 Tab by mouth daily. 90 Tab 3    clopidogrel (PLAVIX) 75 mg tab Take 1 Tab by mouth daily. 90 Tab 3    carvedilol (COREG) 6.25 mg tablet Take 1 Tab by mouth two (2) times a day. 180 Tab 3    gemfibrozil (LOPID) 600 mg tablet Take 1 Tab by mouth two (2) times a day. 180 Tab 11    rosuvastatin (CRESTOR) 20 mg tablet Take 1 Tab by mouth nightly. 90 Tab 3    colesevelam (WELCHOL) 625 mg tablet TAKE TWO TABLETS BY MOUTH TWICE DAILY WITH FOOD 120 Tab 11    losartan (COZAAR) 25 mg tablet Take 0.5 Tabs by mouth daily. 30 Tab 6    cholecalciferol, vitamin D3, (VITAMIN D3) 2,000 unit tab Take 1 Tab by mouth daily.  aspirin 81 mg tablet Take 81 mg by mouth daily.       ketorolac (ACULAR) 0.5 % ophthalmic solution        Allergies   Allergen Reactions    Lisinopril Other (comments)     High K     Family History   Problem Relation Age of Onset    Diabetes Maternal Grandmother     Diabetes Paternal Grandfather     Hypertension Mother      Social History   Substance Use Topics    Smoking status: Never Smoker    Smokeless tobacco: Never Used    Alcohol use No     Patient Active Problem List   Diagnosis Code    HTN (hypertension) I10    Allergic rhinitis J30.9    CAD (coronary artery disease) I25.10    PAC (premature atrial contraction) I49.1    Esophageal reflux K21.9    Other and unspecified hyperlipidemia E78.5    GERD (gastroesophageal reflux disease) K21.9    PVC (premature ventricular contraction) I49.3    Carotid artery stenosis I65.29    Elevated serum creatinine R79.89    IFG (impaired fasting glucose) R73.01    CKD (chronic kidney disease), stage III N18.3    ACEI/ARB contraindicated Z53.09       Depression Risk Factor Screening:     PHQ over the last two weeks 6/1/2017   Little interest or pleasure in doing things Not at all   Feeling down, depressed or hopeless Not at all   Total Score PHQ 2 0     Alcohol Risk Factor Screening: On any occasion during the past 3 months, have you had more than 3 drinks containing alcohol? No    Do you average more than 7 drinks per week? No      Functional Ability and Level of Safety:     Hearing Loss   Wears hearing sheng    Activities of Daily Living   Self-care. Requires assistance with: no ADLs    Fall Risk     Fall Risk Assessment, last 12 mths 3/6/2017   Able to walk? Yes   Fall in past 12 months? No     Abuse Screen   Patient is not abused    Review of Systems   Pertinent items are noted in HPI. Physical Examination     Evaluation of Cognitive Function:  Mood/affect:  neutral  Appearance: age appropriate  Family member/caregiver input: n/a        Patient Care Team:  Debby Coto MD as PCP - Selena Haskins MD (Cardiology)    Advice/Referrals/Counseling   Education and counseling provided:  Are appropriate based on today's review and evaluation      Assessment/Plan   Sanjeev Mcpherson was seen today for blood pressure check, cholesterol problem and annual wellness visit.     Diagnoses and all orders for this visit:    Routine general medical examination at a health care facility    Essential hypertension  -     METABOLIC PANEL, COMPREHENSIVE    Gastroesophageal reflux disease without esophagitis    IFG (impaired fasting glucose)  -     HEMOGLOBIN A1C WITH EAG    CKD (chronic kidney disease), stage III    Hyperlipidemia, unspecified hyperlipidemia type  -     Cancel: LIPID PANEL    Screening for alcoholism    Screening for depression  -     Depression Screen Annual      Follow-up Disposition:  Return in about 6 months (around 12/1/2017) for bp, chol, ifg..

## 2017-06-01 NOTE — PATIENT INSTRUCTIONS
Medicare Wellness Visit, Female    The best way to live healthy is to have a healthy lifestyle by eating a well-balanced diet, exercising regularly, limiting alcohol and stopping smoking. Regular physical exams and screening tests are another way to keep healthy. Preventive exams provided by your health care provider can find health problems before they become diseases or illnesses. Preventive services including immunizations, screening tests, monitoring and exams can help you take care of your own health. All people over age 72 should have a pneumovax  and and a prevnar shot to prevent pneumonia. These are once in a lifetime unless you and your provider decide differently. All people over 65 should have a yearly flu shot and a tetanus vaccine every 10 years. A bone mass density to screen for osteoporosis or thinning of the bones should be done every 2 years after 65. Screening for diabetes mellitus with a blood sugar test should be done every year. Glaucoma is a disease of the eye due to increased ocular pressure that can lead to blindness and it should be done every year by an eye professional.    Cardiovascular screening tests that check for elevated lipids (fatty part of blood) which can lead to heart disease and strokes should be done every 5 years. Colorectal screening that evaluates for blood or polyps in your colon should be done yearly as a stool test or every five years as a flexible sigmoidoscope or every 10 years as a colonoscopy up to age 76. Breast cancer screening with a mammogram is recommended biennially  for women age 54-69. Screening for cervical cancer with a pap smear and pelvic exam is recommended for women after age 72 years every 2 years up to age 79 or when the provider and patient decide to stop. If there is a history of cervical abnormalities or other increased risk for cancer then the test is recommended yearly.     Hepatitis C screening is also recommended for anyone born between [birth year] through [address]. A shingles vaccine is also recommended once in a lifetime after age 61. Your Medicare Wellness Exam is recommended annually. Here is a list of your current Health Maintenance items with a due date:  Health Maintenance Due   Topic Date Due    Annual Well Visit  02/04/2017          Gastroesophageal Reflux Disease (GERD): Care Instructions  Your Care Instructions    Gastroesophageal reflux disease (GERD) is the backward flow of stomach acid into the esophagus. The esophagus is the tube that leads from your throat to your stomach. A one-way valve prevents the stomach acid from moving up into this tube. When you have GERD, this valve does not close tightly enough. If you have mild GERD symptoms including heartburn, you may be able to control the problem with antacids or over-the-counter medicine. Changing your diet, losing weight, and making other lifestyle changes can also help reduce symptoms. Follow-up care is a key part of your treatment and safety. Be sure to make and go to all appointments, and call your doctor if you are having problems. Its also a good idea to know your test results and keep a list of the medicines you take. How can you care for yourself at home? · Take your medicines exactly as prescribed. Call your doctor if you think you are having a problem with your medicine. · Your doctor may recommend over-the-counter medicine. For mild or occasional indigestion, antacids, such as Tums, Gaviscon, Mylanta, or Maalox, may help. Your doctor also may recommend over-the-counter acid reducers, such as Pepcid AC, Tagamet HB, Zantac 75, or Prilosec. Read and follow all instructions on the label. If you use these medicines often, talk with your doctor. · Change your eating habits. ¨ Its best to eat several small meals instead of two or three large meals. ¨ After you eat, wait 2 to 3 hours before you lie down.   ¨ Chocolate, mint, and alcohol can make GERD worse.  ¨ Spicy foods, foods that have a lot of acid (like tomatoes and oranges), and coffee can make GERD symptoms worse in some people. If your symptoms are worse after you eat a certain food, you may want to stop eating that food to see if your symptoms get better. · Do not smoke or chew tobacco. Smoking can make GERD worse. If you need help quitting, talk to your doctor about stop-smoking programs and medicines. These can increase your chances of quitting for good. · If you have GERD symptoms at night, raise the head of your bed 6 to 8 inches by putting the frame on blocks or placing a foam wedge under the head of your mattress. (Adding extra pillows does not work.)  · Do not wear tight clothing around your middle. · Lose weight if you need to. Losing just 5 to 10 pounds can help. When should you call for help? Call your doctor now or seek immediate medical care if:  · You have new or different belly pain. · Your stools are black and tarlike or have streaks of blood. Watch closely for changes in your health, and be sure to contact your doctor if:  · Your symptoms have not improved after 2 days. · Food seems to catch in your throat or chest.  Where can you learn more? Go to http://mian-joseph.info/. Enter V358 in the search box to learn more about \"Gastroesophageal Reflux Disease (GERD): Care Instructions. \"  Current as of: August 9, 2016  Content Version: 11.2  © 8765-4352 [employer]. Care instructions adapted under license by [employer] (which disclaims liability or warranty for this information). If you have questions about a medical condition or this instruction, always ask your healthcare professional. [place] [address] any warranty or liability for your use of this information.

## 2017-06-01 NOTE — MR AVS SNAPSHOT
Visit Information Date & Time Provider Department Dept. Phone Encounter #  
 6/1/2017  2:30 PM Margarita Mcrae MD [facility] [phone] [id number] Follow-up Instructions Return in about 6 months (around 12/1/2017) for bp, chol, ifg.  
  
Your Appointments 10/18/2017 10:00 AM  
ESTABLISHED PATIENT with Shannon Lamb MD  
[facility] ([facility]) Appt Note: 6 month follow up  
 [address]  [place] [address], [facility] [address] [address] Upcoming Health Maintenance Date Due  
 MEDICARE YEARLY EXAM 2/4/2017 INFLUENZA AGE 9 TO ADULT 8/1/2017 Pneumococcal 65+ Low/Medium Risk (2 of 2 - PPSV23) 6/1/2018 GLAUCOMA SCREENING Q2Y 2/27/2019 DTaP/Tdap/Td series (2 - Td) 1/19/2025 COLONOSCOPY 1/28/2025 Allergies as of 6/1/2017  Review Complete On: 6/1/2017 By: Margarita Mcrae MD  
  
 Severity Noted Reaction Type Reactions Lisinopril  09/27/2015    Other (comments) High K Current Immunizations  Reviewed on 6/1/2017 Name Date Pneumococcal Vaccine (Unspecified Type)  Deferred (Patient Refused) Tdap 1/19/2015 11:00 AM  
  
 Reviewed by Margarita Mcrae MD on 6/1/2017 at  3:07 PM  
You Were Diagnosed With   
  
 Codes Comments Essential hypertension    -  Primary ICD-10-CM: I10 
ICD-9-CM: 401.9 Gastroesophageal reflux disease without esophagitis     ICD-10-CM: K21.9 ICD-9-CM: 530.81 IFG (impaired fasting glucose)     ICD-10-CM: R73.01 
ICD-9-CM: 790.21 CKD (chronic kidney disease), stage III     ICD-10-CM: N18.3 ICD-9-CM: 186. 3 Hyperlipidemia, unspecified hyperlipidemia type     ICD-10-CM: E78.5 ICD-9-CM: 272.4 Routine general medical examination at a health care facility     ICD-10-CM: Z00.00 ICD-9-CM: V70.0 Screening for alcoholism     ICD-10-CM: Z13.89 ICD-9-CM: V79.1 Screening for depression     ICD-10-CM: Z13.89 ICD-9-CM: V79.0 Vitals BP Pulse Temp Resp Height(growth percentile) Weight(growth percentile) 166/90 65 97.9 °F (36.6 °C) (Oral) 14 5' 3\" (1.6 m) 122 lb (55.3 kg) SpO2 BMI OB Status Smoking Status 98% 21.61 kg/m2 Postmenopausal Never Smoker Vitals History BMI and BSA Data Body Mass Index Body Surface Area  
 21.61 kg/m 2 1.57 m 2 Preferred Pharmacy Pharmacy Name [facility] [place], [place] - [address] [place] [address] Bekah Florez [phone] Your Updated Medication List  
  
   
This list is accurate as of: 6/1/17  3:21 PM.  Always use your most recent med list. amLODIPine 5 mg tablet Commonly known as:  Starla Medici Take 1 Tab by mouth daily. aspirin 81 mg tablet Take 81 mg by mouth daily. carvedilol 6.25 mg tablet Commonly known as:  Troncoso Brod Take 1 Tab by mouth two (2) times a day. clopidogrel 75 mg Tab Commonly known as:  PLAVIX Take 1 Tab by mouth daily. colesevelam 625 mg tablet Commonly known as:  WELCHOL  
TAKE TWO TABLETS BY MOUTH TWICE DAILY WITH FOOD  
  
 famotidine 20 mg tablet Commonly known as:  PEPCID Take 1 Tab by mouth two (2) times a day. gemfibrozil 600 mg tablet Commonly known as:  LOPID Take 1 Tab by mouth two (2) times a day.  
  
 ketorolac 0.5 % ophthalmic solution Commonly known as:  ACULAR  
  
 losartan 25 mg tablet Commonly known as:  COZAAR Take 0.5 Tabs by mouth daily. nicotinic acid 500 mg tablet Commonly known as:  NIACIN Take 500 mg by mouth Daily (before breakfast). rosuvastatin 20 mg tablet Commonly known as:  CRESTOR Take 1 Tab by mouth nightly. VITAMIN D3 2,000 unit Tab Generic drug:  cholecalciferol (vitamin D3) Take 1 Tab by mouth daily. We Performed the Following [address] [[id number] hospitals] HEMOGLOBIN A1C WITH EAG [36648 CPT(R)] METABOLIC PANEL, COMPREHENSIVE [27212 CPT(R)] Follow-up Instructions Return in about 6 months (around 12/1/2017) for bp, chol, ifg.  
  
  
Patient Instructions Medicare Wellness Visit, Female The best way to live healthy is to have a healthy lifestyle by eating a well-balanced diet, exercising regularly, limiting alcohol and stopping smoking. Regular physical exams and screening tests are another way to keep healthy. Preventive exams provided by your health care provider can find health problems before they become diseases or illnesses. Preventive services including immunizations, screening tests, monitoring and exams can help you take care of your own health. All people over age 72 should have a pneumovax  and and a prevnar shot to prevent pneumonia. These are once in a lifetime unless you and your provider decide differently. All people over 65 should have a yearly flu shot and a tetanus vaccine every 10 years. A bone mass density to screen for osteoporosis or thinning of the bones should be done every 2 years after 65. Screening for diabetes mellitus with a blood sugar test should be done every year. Glaucoma is a disease of the eye due to increased ocular pressure that can lead to blindness and it should be done every year by an eye professional. 
 
Cardiovascular screening tests that check for elevated lipids (fatty part of blood) which can lead to heart disease and strokes should be done every 5 years. Colorectal screening that evaluates for blood or polyps in your colon should be done yearly as a stool test or every five years as a flexible sigmoidoscope or every 10 years as a colonoscopy up to age 76. Breast cancer screening with a mammogram is recommended biennially  for women age 54-69.  
 
Screening for cervical cancer with a pap smear and pelvic exam is recommended for women after age 72 years every 2 years up to age 79 or when the provider and patient decide to stop. If there is a history of cervical abnormalities or other increased risk for cancer then the test is recommended yearly. Hepatitis C screening is also recommended for anyone born between 80 through [address]. A shingles vaccine is also recommended once in a lifetime after age 61. Your Medicare Wellness Exam is recommended annually. Here is a list of your current Health Maintenance items with a due date: 
Health Maintenance Due Topic Date Due  
 Annual Well Visit  02/04/2017 Gastroesophageal Reflux Disease (GERD): Care Instructions Your Care Instructions Gastroesophageal reflux disease (GERD) is the backward flow of stomach acid into the esophagus. The esophagus is the tube that leads from your throat to your stomach. A one-way valve prevents the stomach acid from moving up into this tube. When you have GERD, this valve does not close tightly enough. If you have mild GERD symptoms including heartburn, you may be able to control the problem with antacids or over-the-counter medicine. Changing your diet, losing weight, and making other lifestyle changes can also help reduce symptoms. Follow-up care is a key part of your treatment and safety. Be sure to make and go to all appointments, and call your doctor if you are having problems. Its also a good idea to know your test results and keep a list of the medicines you take. How can you care for yourself at home? · Take your medicines exactly as prescribed. Call your doctor if you think you are having a problem with your medicine. · Your doctor may recommend over-the-counter medicine. For mild or occasional indigestion, antacids, such as Tums, Gaviscon, Mylanta, or Maalox, may help. Your doctor also may recommend over-the-counter acid reducers, such as Pepcid AC, Tagamet HB, Zantac 75, or Prilosec. Read and follow all instructions on the label.  If you use these medicines often, talk with your doctor. · Change your eating habits. ¨ Its best to eat several small meals instead of two or three large meals. ¨ After you eat, wait 2 to 3 hours before you lie down. ¨ Chocolate, mint, and alcohol can make GERD worse. ¨ Spicy foods, foods that have a lot of acid (like tomatoes and oranges), and coffee can make GERD symptoms worse in some people. If your symptoms are worse after you eat a certain food, you may want to stop eating that food to see if your symptoms get better. · Do not smoke or chew tobacco. Smoking can make GERD worse. If you need help quitting, talk to your doctor about stop-smoking programs and medicines. These can increase your chances of quitting for good. · If you have GERD symptoms at night, raise the head of your bed 6 to 8 inches by putting the frame on blocks or placing a foam wedge under the head of your mattress. (Adding extra pillows does not work.) · Do not wear tight clothing around your middle. · Lose weight if you need to. Losing just 5 to 10 pounds can help. When should you call for help? Call your doctor now or seek immediate medical care if: 
· You have new or different belly pain. · Your stools are black and tarlike or have streaks of blood. Watch closely for changes in your health, and be sure to contact your doctor if: 
· Your symptoms have not improved after 2 days. · Food seems to catch in your throat or chest. 
Where can you learn more? Go to http://mian-joseph.info/. Enter W977 in the search box to learn more about \"Gastroesophageal Reflux Disease (GERD): Care Instructions. \" Current as of: August 9, 2016 Content Version: 11.2 © 5317-1533 [employer]. Care instructions adapted under license by [employer] (which disclaims liability or warranty for this information).  If you have questions about a medical condition or this instruction, always ask your healthcare professional. Natal[employer] disclaims any warranty or liability for your use of this information. Introducing [facility]! Rosa Yee introduces [employer] patient portal. Now you can access parts of your medical record, email your doctor's office, and request medication refills online. 1. In your internet browser, go to [email or web address] 2. Click on the First Time User? Click Here link in the Sign In box. You will see the New Member Sign Up page. 3. Enter your [employer] Access Code exactly as it appears below. You will not need to use this code after youve completed the sign-up process. If you do not sign up before the expiration date, you must request a new code. · [employer] Access Code: [email or web address] Expires: 8/30/2017  3:10 PM 
 
4. Enter the last four digits of your Social Security Number (xxxx) and Date of Birth (mm/dd/yyyy) as indicated and click Submit. You will be taken to the next sign-up page. 5. Create a [employer] ID. This will be your [employer] login ID and cannot be changed, so think of one that is secure and easy to remember. 6. Create a [employer] password. You can change your password at any time. 7. Enter your Password Reset Question and Answer. This can be used at a later time if you forget your password. 8. Enter your e-mail address. You will receive e-mail notification when new information is available in [address] 9. Click Sign Up. You can now view and download portions of your medical record. 10. Click the Download Summary menu link to download a portable copy of your medical information. If you have questions, please visit the Frequently Asked Questions section of the [employer] website. Remember, [employer] is NOT to be used for urgent needs. For medical emergencies, dial 911. Now available from your iPhone and Android! Please provide this summary of care documentation to your next provider. Your primary care clinician is listed as Jason Sinha. If you have any questions after today's visit, please call [phone].

## 2017-06-01 NOTE — PROGRESS NOTES
Patient received paperwork for advance directive during previous visit but has not completed at this time     Reviewed record In preparation for visit and have obtained necessary documentation      1. Have you been to the ER, urgent care clinic since your last visit? Hospitalized since your last visit? NO    2. Have you seen or consulted any other health care providers outside of the [address] [place] [address] since your last visit? Include any pap smears or colon screening.  NO

## 2017-06-02 LAB
ALBUMIN SERPL-MCNC: 4.4 G/DL (ref 3.5–4.8)
ALBUMIN/GLOB SERPL: 1.8 {RATIO} (ref 1.2–2.2)
ALP SERPL-CCNC: 102 IU/L (ref 39–117)
ALT SERPL-CCNC: 10 IU/L (ref 0–32)
AST SERPL-CCNC: 18 IU/L (ref 0–40)
BILIRUB SERPL-MCNC: 0.3 MG/DL (ref 0–1.2)
BUN SERPL-MCNC: 24 MG/DL (ref 8–27)
BUN/CREAT SERPL: 32 (ref 12–28)
CALCIUM SERPL-MCNC: 9.7 MG/DL (ref 8.7–10.3)
CHLORIDE SERPL-SCNC: 103 MMOL/L (ref 96–106)
CO2 SERPL-SCNC: 23 MMOL/L (ref 18–29)
CREAT SERPL-MCNC: 0.75 MG/DL (ref 0.57–1)
EST. AVERAGE GLUCOSE BLD GHB EST-MCNC: 123 MG/DL
GLOBULIN SER CALC-MCNC: 2.4 G/DL (ref 1.5–4.5)
GLUCOSE SERPL-MCNC: 89 MG/DL (ref 65–99)
HBA1C MFR BLD: 5.9 % (ref 4.8–5.6)
POTASSIUM SERPL-SCNC: 4.8 MMOL/L (ref 3.5–5.2)
PROT SERPL-MCNC: 6.8 G/DL (ref 6–8.5)
SODIUM SERPL-SCNC: 141 MMOL/L (ref 134–144)

## 2017-07-31 RX ORDER — FAMOTIDINE 20 MG/1
20 TABLET, FILM COATED ORAL 2 TIMES DAILY
Qty: 180 TAB | Refills: 0 | Status: SHIPPED | OUTPATIENT
Start: 2017-07-31 | End: 2017-10-30 | Stop reason: SDUPTHER

## 2017-08-09 ENCOUNTER — OFFICE VISIT (OUTPATIENT)
Dept: INTERNAL MEDICINE CLINIC | Age: 77
End: 2017-08-09

## 2017-08-09 VITALS
DIASTOLIC BLOOD PRESSURE: 68 MMHG | BODY MASS INDEX: 22.08 KG/M2 | TEMPERATURE: 97.9 F | OXYGEN SATURATION: 97 % | RESPIRATION RATE: 16 BRPM | HEART RATE: 61 BPM | SYSTOLIC BLOOD PRESSURE: 144 MMHG | WEIGHT: 124.6 LBS | HEIGHT: 63 IN

## 2017-08-09 DIAGNOSIS — M25.561 ACUTE PAIN OF RIGHT KNEE: ICD-10-CM

## 2017-08-09 DIAGNOSIS — M71.21 BAKER'S CYST, RIGHT: Primary | ICD-10-CM

## 2017-08-09 NOTE — PATIENT INSTRUCTIONS
Baker's Cyst: Care Instructions  Your Care Instructions    A Baker's cyst is a swelling behind the knee. It may cause pain or stiffness when you bend your knee or straighten it all the way. Baker's cysts are also called popliteal cysts. If you have arthritis or another condition that is the cause of the Baker's cyst, your doctor may treat that condition. A Baker's cyst may go away on its own. If not, or if it is causing a lot of discomfort, your doctor may drain the fluid that has built up behind the knee. In some cases, a Baker's cyst is removed in surgery. There are things you can do at home, such as staying off your leg, to reduce the swelling and pain. Follow-up care is a key part of your treatment and safety. Be sure to make and go to all appointments, and call your doctor if you are having problems. It's also a good idea to know your test results and keep a list of the medicines you take. How can you care for yourself at home? · Rest your knee as much as possible. · Ask your doctor if you can take an over-the-counter pain medicine, such as acetaminophen (Tylenol), ibuprofen (Advil, Motrin), or naproxen (Aleve). Be safe with medicines. Read and follow all instructions on the label. · Use a cane, a crutch, a walker, or another device if you need help to get around. These can help rest your knees. · If you have an elastic bandage, make sure it is snug but not so tight that your leg is numb, tingles, or swells below the bandage. Ask your doctor if you can loosen the bandage if it is too tight. · Follow your doctor's instructions about how much weight you can put on your knee. · Stay at a healthy weight. Being overweight puts extra strain on your knee. When should you call for help? Call 911 anytime you think you may need emergency care. For example, call if:  · You have sudden chest pain and shortness of breath, and you cough up blood.   Call your doctor now or seek immediate medical care if:  · You have signs of a blood clot, such as:  ¨ Pain in your calf, thigh, or groin. ¨ Redness and swelling in your leg or groin. · You have sudden swelling, warmth, or pain in any joint. · You have joint pain and a fever or rash. · You have such bad pain that you cannot use the joint. Watch closely for changes in your health, and be sure to contact your doctor if:  · You have mild joint symptoms that continue even with more than 6 weeks of care at home. · You have stomach pain or other problems with your medicine. Where can you learn more? Go to http://mian-joseph.info/. Enter D455 in the search box to learn more about \"Baker's Cyst: Care Instructions. \"  Current as of: March 21, 2017  Content Version: 11.3  © 9550-5355 [employer]. Care instructions adapted under license by [employer] (which disclaims liability or warranty for this information). If you have questions about a medical condition or this instruction, always ask your healthcare professional. [place] [address] any warranty or liability for your use of this information.

## 2017-08-09 NOTE — PROGRESS NOTES
HPI  Ms. Gloria Patton is a 67y.o. year old female, she is seen today for right knee pain which may radiate to posterior thigh and lower back. Started few weeks ago with feeling stiff with mild pain, then was in car for 4.5 hours last week and when she got out of car had excruciating pain in right knee pain. Pain is behind knee. Mild swelling, no heat or redness. Has tried ice few times, no tylenol or any other medications. Worse when she bears weight, sometimes when she is in bed. No known injury. Worse walking down stairs. Since knee pain has numbing sensation right toes at times. BP was 481 systolic at home this morning. Has noticed leg cramps off and on and with knee pain stopped gemfibrozil. Chief Complaint   Patient presents with    Knee Pain     Room 1// R knee pain x \"weeks\" wosres sine 4 hour drive on Friday // NON fasting         Prior to Admission medications    Medication Sig Start Date End Date Taking? Authorizing Provider   famotidine (PEPCID) 20 mg tablet Take 1 Tab by mouth two (2) times a day. 7/31/17  Yes Oswaldo Yates MD   nicotinic acid (NIACIN) 500 mg tablet Take 500 mg by mouth Daily (before breakfast). Yes Historical Provider   amLODIPine (NORVASC) 5 mg tablet Take 1 Tab by mouth daily. 4/19/17  Yes Lorena Ziegler MD   clopidogrel (PLAVIX) 75 mg tab Take 1 Tab by mouth daily. 4/19/17  Yes Lorena Ziegler MD   carvedilol (COREG) 6.25 mg tablet Take 1 Tab by mouth two (2) times a day. 4/10/17  Yes Lorena Ziegler MD   gemfibrozil (LOPID) 600 mg tablet Take 1 Tab by mouth two (2) times a day. 1/18/17  Yes Lorena Ziegler MD   rosuvastatin (CRESTOR) 20 mg tablet Take 1 Tab by mouth nightly. 10/19/16  Yes Oswaldo Yates MD   [facility]) 625 mg tablet TAKE TWO TABLETS BY MOUTH TWICE DAILY WITH FOOD 7/27/16  Yes Lorena Ziegler MD   losartan (COZAAR) 25 mg tablet Take 0.5 Tabs by mouth daily.  7/27/16  Yes Lorena Ziegler MD cholecalciferol, vitamin D3, (VITAMIN D3) 2,000 unit tab Take 1 Tab by mouth daily. Yes Historical Provider   aspirin 81 mg tablet Take 81 mg by mouth daily. Yes Historical Provider   ketorolac (ACULAR) 0.5 % ophthalmic solution  2/27/17   Historical Provider         Allergies   Allergen Reactions    Lisinopril Other (comments)     High K         REVIEW OF SYSTEMS:  Per HPI    PHYSICAL EXAM:  Visit Vitals    /72 (BP 1 Location: Right arm, BP Patient Position: Sitting)    Pulse 61    Temp 97.9 °F (36.6 °C) (Oral)    Resp 16    Ht 5' 3\" (1.6 m)    Wt 124 lb 9.6 oz (56.5 kg)    SpO2 97%    BMI 22.07 kg/m2     Constitutional: Appears well-developed and well-nourished. No distress. HENT:   Head: Normocephalic and atraumatic. Eyes: No scleral icterus. Cardiovascular: Normal S1/S2, regular rhythm. No murmurs, rubs, or gallops. Pulmonary/Chest: Effort normal and breath sounds normal. No respiratory distress. No wheezes, rhonchi, or rales. Right knee: +bony enlargement with mild swelling medially, +fullness and discomfort in right popliteal fossa, +crepitus on ROM testing, ligaments intact  Ext: No edema. 1+ right pedal pulse  Neurological: Alert. Strength 5/5 b/l le  Psychiatric: Normal mood and affect. Behavior is normal.     Lab Results   Component Value Date/Time    Sodium 141 06/01/2017 03:21 PM    Potassium 4.8 06/01/2017 03:21 PM    Chloride 103 06/01/2017 03:21 PM    CO2 23 06/01/2017 03:21 PM    Anion gap 9 01/17/2014 05:49 PM    Glucose 89 06/01/2017 03:21 PM    BUN 24 06/01/2017 03:21 PM    Creatinine 0.75 06/01/2017 03:21 PM    BUN/Creatinine ratio 32 06/01/2017 03:21 PM    GFR est AA 89 06/01/2017 03:21 PM    GFR est non-AA 77 06/01/2017 03:21 PM    Calcium 9.7 06/01/2017 03:21 PM    Bilirubin, total 0.3 06/01/2017 03:21 PM    AST (SGOT) 18 06/01/2017 03:21 PM    Alk.  phosphatase 102 06/01/2017 03:21 PM    Protein, total 6.8 06/01/2017 03:21 PM    Albumin 4.4 06/01/2017 03:21 PM Globulin 4.1 01/17/2014 05:49 PM    A-G Ratio 1.8 06/01/2017 03:21 PM    ALT (SGPT) 10 06/01/2017 03:21 PM     Lab Results   Component Value Date/Time    Hemoglobin A1c 5.9 06/01/2017 03:21 PM    Hemoglobin A1c 6.1 08/15/2016 08:02 AM    Hemoglobin A1c 5.9 02/09/2016 09:27 AM      Lab Results   Component Value Date/Time    Cholesterol, total 214 04/14/2017 09:32 AM    HDL Cholesterol 108 04/14/2017 09:32 AM    LDL, calculated 97 04/14/2017 09:32 AM    VLDL, calculated 9 04/14/2017 09:32 AM    Triglyceride 47 04/14/2017 09:32 AM    CHOL/HDL Ratio 3.1 09/28/2011 05:00 AM          ASSESSMENT/PLAN  Diagnoses and all orders for this visit:    1. Baker's cyst, right  Try compression sleeve, ice, tylenol prn - should see ortho if not improved  2. Acute pain of right knee    For high cholesterol rec restarting gemfibrozil, could take every other day to start to see if myalgias are worse    There are no preventive care reminders to display for this patient. Follow-up Disposition:  Return if symptoms worsen or fail to improve. Reviewed plan of care. Patient has provided input and agrees with goals. The nurse provided the patient and/or family with advanced directive information if needed and encouraged the patient to provide a copy to the office when available.

## 2017-08-09 NOTE — PROGRESS NOTES
[facility]    Chief Complaint   Patient presents with   [address] [place] [address] Knee Pain     Room 1// R knee pain        1. Have you been to the ER, urgent care clinic since your last visit? Hospitalized since your last visit? NO    2. Have you seen or consulted any other health care providers outside of the [address] [place] [address] since your last visit? Include any pap smears or colon screening.  NO      Dr Carmen Ronquillo notified of reason for visit and vitals        Patient received paperwork for advance directive during previous visit but has not completed at this time     Reviewed record In preparation for visit, huddled with provider and have obtained necessary documentation

## 2017-08-09 NOTE — MR AVS SNAPSHOT
Visit Information Date & Time Provider Department Dept. Phone Encounter #  
 8/9/2017 11:15 AM MD Tan Umana Goes [phone] [id number] Follow-up Instructions Return if symptoms worsen or fail to improve. Your Appointments 8/9/2017 11:30 AM  
ACUTE CARE with MD Tan Umana Goes [address] [place] [address]) Appt Note: Right knee pain/can barely bend [address] [facility] [address] [phone]  
  
   
 [facility]  
  
    
 10/18/2017 10:00 AM  
ESTABLISHED PATIENT with Sadaf Arthur MD  
[facility] ([address] [facility]) Appt Note: 6 month follow up  
 [address] [facility] [address]  
[address] [facility] [address]  
  
    
 12/4/2017 10:00 AM  
ROUTINE CARE with MD Tan Umana Goes [address] [place] [address]) Appt Note: 6mth f/u;  
 [address] [facility] [address] [phone]  
  
   
 [address] [facility] [address] Upcoming Health Maintenance Date Due Pneumococcal 65+ Low/Medium Risk (2 of 2 - PPSV23) 6/1/2018 MEDICARE YEARLY EXAM 6/2/2018 GLAUCOMA SCREENING Q2Y 2/27/2019 DTaP/Tdap/Td series (2 - Td) 1/19/2025 COLONOSCOPY 1/28/2025 Allergies as of 8/9/2017  Review Complete On: 8/9/2017 By: Carlotta Montgomery MD  
  
 Severity Noted Reaction Type Reactions Lisinopril  09/27/2015    Other (comments) High K Current Immunizations  Reviewed on 6/1/2017 Name Date Tdap 1/19/2015 11:00 AM  
 ZZZ-RETIRED (DO NOT USE) Pneumococcal Vaccine (Unspecified Type)  Deferred (Patient Refused) Not reviewed this visit You Were Diagnosed With   
  
 Codes Comments  Baker's cyst, right    -  Primary ICD-10-CM: M71.21 
ICD-9-CM: 727.51   
 Acute pain of right knee     ICD-10-CM: M25.561 ICD-9-CM: 719.46 Vitals BP Pulse Temp Resp Height(growth percentile) Weight(growth percentile) 144/68 61 97.9 °F (36.6 °C) (Oral) 16 5' 3\" (1.6 m) 124 lb 9.6 oz (56.5 kg) SpO2 BMI OB Status Smoking Status 97% 22.07 kg/m2 Postmenopausal Never Smoker Vitals History BMI and BSA Data Body Mass Index Body Surface Area 22.07 kg/m 2 1.58 m 2 Preferred Pharmacy Pharmacy Name Phone [facility] [address] [place], [place] - [address] [place] [address] Francis Derick [phone] Your Updated Medication List  
  
   
This list is accurate as of: 8/9/17 11:27 AM.  Always use your most recent med list. amLODIPine 5 mg tablet Commonly known as:  Orsid Shelby Take 1 Tab by mouth daily. aspirin 81 mg tablet Take 81 mg by mouth daily. carvedilol 6.25 mg tablet Commonly known as:  Janalyn Iliana Take 1 Tab by mouth two (2) times a day. clopidogrel 75 mg Tab Commonly known as:  PLAVIX Take 1 Tab by mouth daily. colesevelam 625 mg tablet Commonly known as:  WELCHOL  
TAKE TWO TABLETS BY MOUTH TWICE DAILY WITH FOOD  
  
 famotidine 20 mg tablet Commonly known as:  PEPCID Take 1 Tab by mouth two (2) times a day. gemfibrozil 600 mg tablet Commonly known as:  LOPID Take 1 Tab by mouth two (2) times a day.  
  
 ketorolac 0.5 % ophthalmic solution Commonly known as:  ACULAR  
  
 losartan 25 mg tablet Commonly known as:  COZAAR Take 0.5 Tabs by mouth daily. nicotinic acid 500 mg tablet Commonly known as:  NIACIN Take 500 mg by mouth Daily (before breakfast). rosuvastatin 20 mg tablet Commonly known as:  CRESTOR Take 1 Tab by mouth nightly. VITAMIN D3 2,000 unit Tab Generic drug:  cholecalciferol (vitamin D3) Take 1 Tab by mouth daily. Follow-up Instructions Return if symptoms worsen or fail to improve. Patient Instructions Baker's Cyst: Care Instructions Your Care Instructions A Baker's cyst is a swelling behind the knee. It may cause pain or stiffness when you bend your knee or straighten it all the way. Baker's cysts are also called popliteal cysts. If you have arthritis or another condition that is the cause of the Baker's cyst, your doctor may treat that condition. A Baker's cyst may go away on its own. If not, or if it is causing a lot of discomfort, your doctor may drain the fluid that has built up behind the knee. In some cases, a Baker's cyst is removed in surgery. There are things you can do at home, such as staying off your leg, to reduce the swelling and pain. Follow-up care is a key part of your treatment and safety. Be sure to make and go to all appointments, and call your doctor if you are having problems. It's also a good idea to know your test results and keep a list of the medicines you take. How can you care for yourself at home? · Rest your knee as much as possible. · Ask your doctor if you can take an over-the-counter pain medicine, such as acetaminophen (Tylenol), ibuprofen (Advil, Motrin), or naproxen (Aleve). Be safe with medicines. Read and follow all instructions on the label. · Use a cane, a crutch, a walker, or another device if you need help to get around. These can help rest your knees. · If you have an elastic bandage, make sure it is snug but not so tight that your leg is numb, tingles, or swells below the bandage. Ask your doctor if you can loosen the bandage if it is too tight. · Follow your doctor's instructions about how much weight you can put on your knee. · Stay at a healthy weight. Being overweight puts extra strain on your knee. When should you call for help? Call 911 anytime you think you may need emergency care. For example, call if: 
· You have sudden chest pain and shortness of breath, and you cough up blood. Call your doctor now or seek immediate medical care if: · You have signs of a blood clot, such as: 
¨ Pain in your calf, thigh, or groin. ¨ Redness and swelling in your leg or groin. · You have sudden swelling, warmth, or pain in any joint. · You have joint pain and a fever or rash. · You have such bad pain that you cannot use the joint. Watch closely for changes in your health, and be sure to contact your doctor if: 
· You have mild joint symptoms that continue even with more than 6 weeks of care at home. · You have stomach pain or other problems with your medicine. Where can you learn more? Go to http://mian-joseph.info/. Enter G724 in the search box to learn more about \"Baker's Cyst: Care Instructions. \" Current as of: March 21, 2017 Content Version: 11.3 © 1224-5989 [employer]. Care instructions adapted under license by [employer] (which disclaims liability or warranty for this information). If you have questions about a medical condition or this instruction, always ask your healthcare professional. [address] any warranty or liability for your use of this information. Introducing [facility]! Raf Thompson introduces komoot patient portal. Now you can access parts of your medical record, email your doctor's office, and request medication refills online. 1. In your internet browser, go to [email or web address] 2. Click on the First Time User? Click Here link in the Sign In box. You will see the New Member Sign Up page. 3. Enter your komoot Access Code exactly as it appears below. You will not need to use this code after youve completed the sign-up process. If you do not sign up before the expiration date, you must request a new code. · komoot Access Code: [email or web address] Expires: 8/30/2017  3:10 PM 
 
4. Enter the last four digits of your Social Security Number (xxxx) and Date of Birth (mm/dd/yyyy) as indicated and click Submit.  You will be taken to the next sign-up page. 5. Create a [employer] ID. This will be your [employer] login ID and cannot be changed, so think of one that is secure and easy to remember. 6. Create a [employer] password. You can change your password at any time. 7. Enter your Password Reset Question and Answer. This can be used at a later time if you forget your password. 8. Enter your e-mail address. You will receive e-mail notification when new information is available in [address]. 9. Click Sign Up. You can now view and download portions of your medical record. 10. Click the Download Summary menu link to download a portable copy of your medical information. If you have questions, please visit the Frequently Asked Questions section of the [employer] website. Remember, [employer] is NOT to be used for urgent needs. For medical emergencies, dial 911. Now available from your iPhone and Android! Please provide this summary of care documentation to your next provider. Your primary care clinician is listed as Jason Sinha. If you have any questions after today's visit, please call [phone].

## 2017-08-23 ENCOUNTER — HOSPITAL ENCOUNTER (EMERGENCY)
Age: 77
Discharge: HOME | End: 2017-08-23
Attending: FAMILY MEDICINE

## 2017-08-23 ENCOUNTER — APPOINTMENT (OUTPATIENT)
Dept: GENERAL RADIOLOGY | Age: 77
End: 2017-08-23
Attending: PHYSICIAN ASSISTANT

## 2017-08-23 VITALS
SYSTOLIC BLOOD PRESSURE: 146 MMHG | TEMPERATURE: 97.6 F | WEIGHT: 123 LBS | OXYGEN SATURATION: 97 % | RESPIRATION RATE: 18 BRPM | DIASTOLIC BLOOD PRESSURE: 110 MMHG | HEIGHT: 63 IN | HEART RATE: 75 BPM | BODY MASS INDEX: 21.79 KG/M2

## 2017-08-23 DIAGNOSIS — M25.561 ACUTE PAIN OF RIGHT KNEE: Primary | ICD-10-CM

## 2017-08-23 NOTE — DISCHARGE INSTRUCTIONS
Knee Pain or Injury: Care Instructions  Your Care Instructions    Injuries are a common cause of knee problems. Sudden (acute) injuries may be caused by a direct blow to the knee. They can also be caused by abnormal twisting, bending, or falling on the knee. Pain, bruising, or swelling may be severe, and may start within minutes of the injury. Overuse is another cause of knee pain. Other causes are climbing stairs, kneeling, and other activities that use the knee. Everyday wear and tear, especially as you get older, also can cause knee pain. Rest, along with home treatment, often relieves pain and allows your knee to heal. If you have a serious knee injury, you may need tests and treatment. Follow-up care is a key part of your treatment and safety. Be sure to make and go to all appointments, and call your doctor if you are having problems. It's also a good idea to know your test results and keep a list of the medicines you take. How can you care for yourself at home? · Be safe with medicines. Read and follow all instructions on the label. ¨ If the doctor gave you a prescription medicine for pain, take it as prescribed. ¨ If you are not taking a prescription pain medicine, ask your doctor if you can take an over-the-counter medicine. · Rest and protect your knee. Take a break from any activity that may cause pain. · Put ice or a cold pack on your knee for 10 to 20 minutes at a time. Put a thin cloth between the ice and your skin. · Prop up a sore knee on a pillow when you ice it or anytime you sit or lie down for the next 3 days. Try to keep it above the level of your heart. This will help reduce swelling. · If your knee is not swollen, you can put moist heat, a heating pad, or a warm cloth on your knee. · If your doctor recommends an elastic bandage, sleeve, or other type of support for your knee, wear it as directed.   · Follow your doctor's instructions about how much weight you can put on your leg. Use a cane, crutches, or a walker as instructed. · Follow your doctor's instructions about activity during your healing process. If you can do mild exercise, slowly increase your activity. · Reach and stay at a healthy weight. Extra weight can strain the joints, especially the knees and hips, and make the pain worse. Losing even a few pounds may help. When should you call for help? Call 911 anytime you think you may need emergency care. For example, call if:  · You have symptoms of a blood clot in your lung (called a pulmonary embolism). These may include:  ¨ Sudden chest pain. ¨ Trouble breathing. ¨ Coughing up blood. Call your doctor now or seek immediate medical care if:  · You have severe or increasing pain. · Your leg or foot turns cold or changes color. · You cannot stand or put weight on your knee. · Your knee looks twisted or bent out of shape. · You cannot move your knee. · You have signs of infection, such as:  ¨ Increased pain, swelling, warmth, or redness. ¨ Red streaks leading from the knee. ¨ Pus draining from a place on your knee. ¨ A fever. · You have signs of a blood clot in your leg (called a deep vein thrombosis), such as:  ¨ Pain in your calf, back of the knee, thigh, or groin. ¨ Redness and swelling in your leg or groin. Watch closely for changes in your health, and be sure to contact your doctor if:  · You have tingling, weakness, or numbness in your knee. · You have any new symptoms, such as swelling. · You have bruises from a knee injury that last longer than 2 weeks. · You do not get better as expected. Where can you learn more? Go to http://mian-joseph.info/. Enter K195 in the search box to learn more about \"Knee Pain or Injury: Care Instructions. \"  Current as of: March 20, 2017  Content Version: 11.3  © 8765-0053 [employer].  Care instructions adapted under license by [employer] (which disclaims liability or warranty for this information). If you have questions about a medical condition or this instruction, always ask your healthcare professional. [place] [address] any warranty or liability for your use of this information.

## 2017-08-24 NOTE — UC PROVIDER NOTE
Patient is a 67 y.o. female presenting with knee pain. The history is provided by the patient. Knee Pain    This is a new problem. The current episode started more than 1 week ago. The problem occurs daily. The problem has not changed since onset. The pain is present in the right knee. The quality of the pain is described as aching. The pain is moderate. Associated symptoms include stiffness. The symptoms are aggravated by movement. There has been no history of extremity trauma. Past Medical History:   Diagnosis Date    CAD (coronary artery disease)     CKD (chronic kidney disease), stage III 9/26/2014    Followed by Dr. 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 Valentín Duval History   Problem Relation Age of Onset    Diabetes Maternal Grandmother     Diabetes Paternal Grandfather     Hypertension Mother         Social History     Social History    Marital status:      Spouse name: N/A    Number of children: N/A    Years of education: N/A     Occupational History    Not on file. Social History Main Topics    Smoking status: Never Smoker    Smokeless tobacco: Never Used    Alcohol use No    Drug use: No    Sexual activity: No     Other Topics Concern    Not on file     Social History Narrative                ALLERGIES: Lisinopril    Review of Systems   Constitutional: Negative for activity change. Respiratory: Negative for shortness of breath. Cardiovascular: Negative for chest pain. Musculoskeletal: Positive for arthralgias and stiffness.        Vitals:    08/23/17 1132   BP: (!) 146/110   Pulse: 75   Resp: 18   Temp: 97.6 °F (36.4 °C)   SpO2: 97%   Weight: 55.8 kg (123 lb)   Height: 5' 3\" (1.6 m)       Physical Exam   Constitutional: She is oriented to person, place, and time. She appears well-developed and well-nourished. Pulmonary/Chest: Effort normal.   Musculoskeletal: Normal range of motion. She exhibits no edema or tenderness. Neurological: She is alert and oriented to person, place, and time. Skin: Skin is warm and dry. Psychiatric: She has a normal mood and affect. Her behavior is normal. Judgment and thought content normal.   Nursing note and vitals reviewed. MDM     Differential Diagnosis; Clinical Impression; Plan:     CLINICAL IMPRESSION:  Acute pain of right knee  (primary encounter diagnosis)    Plan:  1. OTC Motrin  2.   3.   Amount and/or Complexity of Data Reviewed:   Tests in the radiology section of CPT®:  Ordered and reviewed  Risk of Significant Complications, Morbidity, and/or Mortality:   Presenting problems: Moderate  Diagnostic procedures: Moderate  Management options:   Moderate  Progress:   Patient progress:  Stable      Procedures

## 2017-08-28 RX ORDER — LOSARTAN POTASSIUM 25 MG/1
12.5 TABLET ORAL DAILY
Qty: 45 TAB | Refills: 3 | Status: SHIPPED | OUTPATIENT
Start: 2017-08-28 | End: 2018-08-23 | Stop reason: SDUPTHER

## 2017-08-28 NOTE — TELEPHONE ENCOUNTER
Requested Prescriptions     Signed Prescriptions Disp Refills    losartan (COZAAR) 25 mg tablet 45 Tab 3     Sig: Take 0.5 Tabs by mouth daily.      Authorizing Provider: Francisco Craig     Ordering User: Tiffany Pickens     Per orders

## 2017-09-27 DIAGNOSIS — I25.10 CORONARY ARTERY DISEASE INVOLVING NATIVE CORONARY ARTERY OF NATIVE HEART WITHOUT ANGINA PECTORIS: ICD-10-CM

## 2017-09-28 RX ORDER — COLESEVELAM 180 1/1
TABLET ORAL
Qty: 360 TAB | Refills: 3 | Status: SHIPPED | OUTPATIENT
Start: 2017-09-28 | End: 2017-10-18 | Stop reason: SDUPTHER

## 2017-09-28 NOTE — TELEPHONE ENCOUNTER
Requested Prescriptions     Signed Prescriptions Disp Refills    colesevelam (WELCHOL) 625 mg tablet 360 Tab 3     Sig: TAKE TWO TABLETS BY MOUTH TWICE DAILY WITH FOOD     Authorizing Provider: Opal Dolan     Ordering User: Jazzy Keepers     Per order.

## 2017-10-12 RX ORDER — ROSUVASTATIN CALCIUM 20 MG/1
20 TABLET, COATED ORAL
Qty: 90 TAB | Refills: 3 | Status: SHIPPED | OUTPATIENT
Start: 2017-10-12 | End: 2018-10-02 | Stop reason: SDUPTHER

## 2017-10-18 ENCOUNTER — OFFICE VISIT (OUTPATIENT)
Dept: CARDIOLOGY CLINIC | Age: 77
End: 2017-10-18

## 2017-10-18 VITALS
DIASTOLIC BLOOD PRESSURE: 76 MMHG | SYSTOLIC BLOOD PRESSURE: 124 MMHG | HEIGHT: 63 IN | BODY MASS INDEX: 21.83 KG/M2 | WEIGHT: 123.2 LBS | HEART RATE: 66 BPM | RESPIRATION RATE: 16 BRPM

## 2017-10-18 DIAGNOSIS — R00.2 HEART PALPITATIONS: ICD-10-CM

## 2017-10-18 DIAGNOSIS — I65.29 STENOSIS OF CAROTID ARTERY, UNSPECIFIED LATERALITY: ICD-10-CM

## 2017-10-18 DIAGNOSIS — I25.10 CORONARY ARTERY DISEASE INVOLVING NATIVE CORONARY ARTERY OF NATIVE HEART WITHOUT ANGINA PECTORIS: ICD-10-CM

## 2017-10-18 DIAGNOSIS — I10 ESSENTIAL HYPERTENSION: ICD-10-CM

## 2017-10-18 DIAGNOSIS — I25.10 CORONARY ARTERY DISEASE INVOLVING NATIVE CORONARY ARTERY OF NATIVE HEART WITHOUT ANGINA PECTORIS: Primary | ICD-10-CM

## 2017-10-18 DIAGNOSIS — E78.5 HYPERLIPIDEMIA, UNSPECIFIED HYPERLIPIDEMIA TYPE: ICD-10-CM

## 2017-10-18 DIAGNOSIS — R73.01 IFG (IMPAIRED FASTING GLUCOSE): ICD-10-CM

## 2017-10-18 DIAGNOSIS — E78.5 HYPERLIPIDEMIA, UNSPECIFIED HYPERLIPIDEMIA TYPE: Primary | ICD-10-CM

## 2017-10-18 DIAGNOSIS — I49.3 PVC (PREMATURE VENTRICULAR CONTRACTION): ICD-10-CM

## 2017-10-18 RX ORDER — COLESEVELAM 180 1/1
TABLET ORAL
Qty: 72 TAB | Refills: 0 | Status: SHIPPED | COMMUNITY
Start: 2017-10-18 | End: 2018-11-15 | Stop reason: SDUPTHER

## 2017-10-18 NOTE — MR AVS SNAPSHOT
Visit Information Date & Time Provider Department Dept. Phone Encounter #  
 10/18/2017 10:00 AM Zeus Burt MD [facility] Marlen Ruggiero [phone] [id number] Follow-up Instructions Return in about 6 months (around 4/18/2018). Follow-up and Disposition History Your Appointments 12/4/2017 10:00 AM  
ROUTINE CARE with Stephen Crenshaw MD  
[facility] [address]) Appt Note: 6mth f/u;  
 [address] [facility] [address] [phone]  
  
   
 8 [facility] [address] 4/18/2018 10:00 AM  
ESTABLISHED PATIENT with Zeus Burt MD  
[facility] ([address]) Appt Note: 6 mo f/u per Dr. Sera Pete [address] [place]  [address] [place] [address]  
[address] [phone] Upcoming Health Maintenance Date Due Pneumococcal 65+ Low/Medium Risk (2 of 2 - PPSV23) 6/1/2018 MEDICARE YEARLY EXAM 6/2/2018 GLAUCOMA SCREENING Q2Y 2/27/2019 DTaP/Tdap/Td series (2 - Td) 1/19/2025 COLONOSCOPY 1/28/2025 Allergies as of 10/18/2017  Review Complete On: 10/18/2017 By: Ena Azar Severity Noted Reaction Type Reactions Lisinopril  09/27/2015    Other (comments) High K Current Immunizations  Reviewed on 6/1/2017 Name Date Tdap 1/19/2015 11:00 AM  
 ZZZ-RETIRED (DO NOT USE) Pneumococcal Vaccine (Unspecified Type)  Deferred (Patient Refused) Not reviewed this visit You Were Diagnosed With   
  
 Codes Comments Coronary artery disease involving native coronary artery of native heart without angina pectoris    -  Primary ICD-10-CM: I25.10 ICD-9-CM: 414.01 Stenosis of carotid artery, unspecified laterality     ICD-10-CM: I65.29 ICD-9-CM: 433.10 PVC (premature ventricular contraction)     ICD-10-CM: I49.3 ICD-9-CM: 427.69   
 Essential hypertension     ICD-10-CM: I10 
ICD-9-CM: 401.9 Heart palpitations     ICD-10-CM: R00.2 ICD-9-CM: 785.1 Hyperlipidemia, unspecified hyperlipidemia type     ICD-10-CM: E78.5 ICD-9-CM: 272.4 Vitals BP Pulse Resp Height(growth percentile) Weight(growth percentile) BMI  
 124/76 (BP 1 Location: Left arm, BP Patient Position: Sitting) 66 16 5' 3\" (1.6 m) 123 lb 3.2 oz (55.9 kg) 21.82 kg/m2 OB Status Smoking Status Postmenopausal Never Smoker Vitals History BMI and BSA Data Body Mass Index Body Surface Area  
 21.82 kg/m 2 1.58 m 2 Preferred Pharmacy Pharmacy Name [facility] [address] [place], [place] - [address] [place] [address] [phone] Your Updated Medication List  
  
   
This list is accurate as of: 10/18/17 10:26 AM.  Always use your most recent med list. amLODIPine 5 mg tablet Commonly known as:  Domenica Blade Take 1 Tab by mouth daily. aspirin 81 mg tablet Take 81 mg by mouth daily. carvedilol 6.25 mg tablet Commonly known as:  Sharolyn Conde Take 1 Tab by mouth two (2) times a day. clopidogrel 75 mg Tab Commonly known as:  PLAVIX Take 1 Tab by mouth daily. colesevelam 625 mg tablet Commonly known as:  WELCHOL  
TAKE TWO TABLETS BY MOUTH TWICE DAILY WITH FOOD  
  
 famotidine 20 mg tablet Commonly known as:  PEPCID Take 1 Tab by mouth two (2) times a day.  
  
 ketorolac 0.5 % ophthalmic solution Commonly known as:  ACULAR  
  
 losartan 25 mg tablet Commonly known as:  COZAAR Take 0.5 Tabs by mouth daily. nicotinic acid 500 mg tablet Commonly known as:  NIACIN Take 500 mg by mouth Daily (before breakfast). rosuvastatin 20 mg tablet Commonly known as:  CRESTOR Take 1 Tab by mouth nightly. VITAMIN D3 2,000 unit Tab Generic drug:  cholecalciferol (vitamin D3) Take 1 Tab by mouth daily. We Performed the Following AMB POC EKG ROUTINE W/ 12 LEADS, INTER & REP [70206 CPT(R)] Follow-up Instructions Return in about 6 months (around 4/18/2018). Introducing [facility]! Margarita Bai introduces [employer] patient portal. Now you can access parts of your medical record, email your doctor's office, and request medication refills online. 1. In your internet browser, go to [email or web address] 2. Click on the First Time User? Click Here link in the Sign In box. You will see the New Member Sign Up page. 3. Enter your [employer] Access Code exactly as it appears below. You will not need to use this code after youve completed the sign-up process. If you do not sign up before the expiration date, you must request a new code. · [employer] Access Code: [email or web address] Expires: 1/16/2018 10:26 AM 
 
4. Enter the last four digits of your Social Security Number (xxxx) and Date of Birth (mm/dd/yyyy) as indicated and click Submit. You will be taken to the next sign-up page. 5. Create a [employer] ID. This will be your [employer] login ID and cannot be changed, so think of one that is secure and easy to remember. 6. Create a [employer] password. You can change your password at any time. 7. Enter your Password Reset Question and Answer. This can be used at a later time if you forget your password. 8. Enter your e-mail address. You will receive e-mail notification when new information is available in [address] 9. Click Sign Up. You can now view and download portions of your medical record. 10. Click the Download Summary menu link to download a portable copy of your medical information. If you have questions, please visit the Frequently Asked Questions section of the [employer] website. Remember, [employer] is NOT to be used for urgent needs. For medical emergencies, dial 911. Now available from your iPhone and Android! Please provide this summary of care documentation to your next provider. Your primary care clinician is listed as Jason Sinha. If you have any questions after today's visit, please call [phone].

## 2017-10-18 NOTE — PROGRESS NOTES
[facility]: Flower Serrano  [phone]    HPI: Chayito Gonzalez, a 67y.o. year-old who presents for follow up regarding her CAD. Continues to walk regularly, she did hurt her leg and it limited her for a while but she is back to it now. Nothing was found. BP is great now. Labs done in 6/17 looked fine. The gemfibrozil was changed to QOD because of leg cramps. She thinks she is having trouble with her memory. Leg cramps are better after that change. She continues to walk regularly without symptoms and feels well with it. Walks 30 min a day 5-6 days a week. No blood in the stool or in the urine    Old HDL labs:    HDL 75 Tg 78  High ApoB, LDLp SdLDL lpa >225!!!!!  Nl Lpla2, bnp 194, Normal aspirin works  Insulin normal, glucose 109, Homocysteine 19  Vit D 32  LFT/CMp normal    Assessment/Plan:  1. HTN-at goal today on losartan, coreg, amlodipine. 2. CAD- stable, s/p TRANG RCA and midLAD 3/11   -Cath 8/12 with proximal RCA stent,  No chest pain now  -lifelong plavix and aspirin(her decision after discussion of risks and benefits)  3. Palpitations- better on coreg, still v-bigemeny at last holter  4. Hyperlipidemia- crestor 40, welchol, trilipix. Check for samples today  -no Zetia due to cost.   5. Anxiety- better since she stopped checking her bp. 6. Reflux- on Nexium, and stable  7. Vit D deficiency 2000u/day, continue, at goal now    8. Impaired glucose tolerance- a1C  Still good  9. CKD Stage 3-stable now, improved, seen by Dr. Ga May. -hyperkalemia in the past improved with stopping spironolactone, 4.8 now, also cut losartan for same  10.  Mild to moderate mitral regurgitation- by TTE 11/15    Stress nuc 6/16 wnl  Echo 11/15 EF 60%, mild TR, PAP 25, mild-mod MR  Carotid US: Mod LICA plaque <90%, no evidence of Subclavian Steal.  Stress nuc: 1/14 normal, EF 69%  Echo 9/12 EF 60, mod MR, mild TR  Cath 2012 RCA stent  Fhx no early cad  Soc no tob, no etoh    She  has a past medical history of CAD (coronary artery disease); CKD (chronic kidney disease), stage III (9/26/2014); HTN (hypertension) (10/1/2010); Hyperlipidemia; and Hypertension. Cardiovascular ROS: positive for - chest pain  Respiratory ROS: negative for - cough, hemoptysis, pleuritic pain or shortness of breath  Neurological ROS: no TIA or stroke symptoms  All other systems negative except as above. PE  Vitals:    10/18/17 0952   BP: 124/76   Pulse: 66   Resp: 16   Weight: 123 lb 3.2 oz (55.9 kg)   Height: 5' 3\" (1.6 m)      Body mass index is 21.82 kg/(m^2).    General appearance - alert, well appearing, and in no distress  Mental status - affect appropriate to mood  Eyes - sclera anicteric, moist mucous membranes  Neck - supple, no significant adenopathy  Lymphatics - no  lymphadenopathy  Chest - clear to auscultation, no wheezes, rales or rhonchi  Heart - normal rate, regular rhythm, normal S1, S2, no murmurs, rubs, clicks or gallops  Abdomen - soft, nontender, nondistended, no masses or organomegaly  Back exam - full range of motion, no tenderness  Neurological - cranial nerves II through XII grossly intact, no focal deficit  Musculoskeletal - no muscular tenderness noted, normal strength  Extremities - peripheral pulses normal, no pedal edema  Skin - normal coloration  no rashes    Recent Labs:  Lab Results   Component Value Date/Time    Cholesterol, total 214 04/14/2017 09:32 AM    HDL Cholesterol 108 04/14/2017 09:32 AM    LDL, calculated 97 04/14/2017 09:32 AM    Triglyceride 47 04/14/2017 09:32 AM    CHOL/HDL Ratio 3.1 09/28/2011 05:00 AM       Lab Results   Component Value Date/Time    Creatinine 0.75 06/01/2017 03:21 PM       Lab Results   Component Value Date/Time    BUN 24 06/01/2017 03:21 PM       Lab Results   Component Value Date/Time    Potassium 4.8 06/01/2017 03:21 PM       Lab Results   Component Value Date/Time    Hemoglobin A1c 5.9 06/01/2017 03:21 PM       Lab Results   Component Value Date/Time HGB 12.8 06/13/2014 11:17 AM       Lab Results   Component Value Date/Time    PLATELET 784 73/68/2911 11:17 AM         Reviewed:  Past Medical History:   Diagnosis Date    CAD (coronary artery disease)     CKD (chronic kidney disease), stage III 9/26/2014    Followed by Dr. 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  colesevelam (WELCHOL) 625 mg tablet TAKE TWO TABLETS BY MOUTH TWICE DAILY WITH FOOD    losartan (COZAAR) 25 mg tablet Take 0.5 Tabs by mouth daily.  famotidine (PEPCID) 20 mg tablet Take 1 Tab by mouth two (2) times a day.  nicotinic acid (NIACIN) 500 mg tablet Take 500 mg by mouth Daily (before breakfast).  amLODIPine (NORVASC) 5 mg tablet Take 1 Tab by mouth daily.  clopidogrel (PLAVIX) 75 mg tab Take 1 Tab by mouth daily.  carvedilol (COREG) 6.25 mg tablet Take 1 Tab by mouth two (2) times a day.  gemfibrozil (LOPID) 600 mg tablet Take 1 Tab by mouth two (2) times a day.  cholecalciferol, vitamin D3, (VITAMIN D3) 2,000 unit tab Take 1 Tab by mouth daily.  aspirin 81 mg tablet Take 81 mg by mouth daily.  ketorolac (ACULAR) 0.5 % ophthalmic solution      No current facility-administered medications for this visit.           Cory Cat MD  [facility] [place]  [address] [facility] [address]  [address]

## 2017-10-18 NOTE — TELEPHONE ENCOUNTER
Requested Prescriptions     Signed Prescriptions Disp Refills    colesevelam (WELCHOL) 625 mg tablet 72 Tab 0     Sig: TAKE TWO TABLETS BY MOUTH TWICE DAILY WITH FOOD     Authorizing Provider: Susan Sinha     Ordering User: Grover Kwan     Per orders

## 2017-10-30 RX ORDER — FAMOTIDINE 20 MG/1
20 TABLET, FILM COATED ORAL 2 TIMES DAILY
Qty: 180 TAB | Refills: 0 | Status: SHIPPED | OUTPATIENT
Start: 2017-10-30 | End: 2018-02-01 | Stop reason: SDUPTHER

## 2017-12-15 RX ORDER — TRIAMCINOLONE ACETONIDE 1 MG/G
OINTMENT TOPICAL
Qty: 30 G | Refills: 2 | Status: SHIPPED | OUTPATIENT
Start: 2017-12-15 | End: 2019-01-23

## 2018-02-01 ENCOUNTER — OFFICE VISIT (OUTPATIENT)
Dept: INTERNAL MEDICINE CLINIC | Age: 78
End: 2018-02-01

## 2018-02-01 VITALS
DIASTOLIC BLOOD PRESSURE: 70 MMHG | TEMPERATURE: 98.6 F | WEIGHT: 128.2 LBS | OXYGEN SATURATION: 95 % | HEART RATE: 76 BPM | RESPIRATION RATE: 14 BRPM | SYSTOLIC BLOOD PRESSURE: 158 MMHG | BODY MASS INDEX: 22.71 KG/M2 | HEIGHT: 63 IN

## 2018-02-01 DIAGNOSIS — I10 ESSENTIAL HYPERTENSION: ICD-10-CM

## 2018-02-01 DIAGNOSIS — E78.5 HYPERLIPIDEMIA, UNSPECIFIED HYPERLIPIDEMIA TYPE: ICD-10-CM

## 2018-02-01 DIAGNOSIS — R73.01 IFG (IMPAIRED FASTING GLUCOSE): Primary | ICD-10-CM

## 2018-02-01 DIAGNOSIS — K21.9 GASTROESOPHAGEAL REFLUX DISEASE WITHOUT ESOPHAGITIS: ICD-10-CM

## 2018-02-01 DIAGNOSIS — N18.30 CKD (CHRONIC KIDNEY DISEASE), STAGE III (HCC): ICD-10-CM

## 2018-02-01 RX ORDER — FAMOTIDINE 20 MG/1
20 TABLET, FILM COATED ORAL 2 TIMES DAILY
Qty: 180 TAB | Refills: 3 | Status: SHIPPED | OUTPATIENT
Start: 2018-02-01 | End: 2019-02-12 | Stop reason: SDUPTHER

## 2018-02-01 NOTE — PROGRESS NOTES
Jessica Bone  Identified pt with two pt identifiers(name and ). Chief Complaint   Patient presents with    Blood Pressure Check     Room 1//     Cholesterol Problem    Blood sugar problem       1. Have you been to the ER, urgent care clinic since your last visit? Hospitalized since your last visit? NO    2. Have you seen or consulted any other health care providers outside of the [address] [place] [address] since your last visit? Include any pap smears or colon screening. NO      Dr Kayleigh Oneil notified of reason for visit, vitals and flowsheets obtained on patients. Patient received paperwork for advance directive during previous visit but has not completed at this time     Reviewed record In preparation for visit, huddled with provider and have obtained necessary documentation      There are no preventive care reminders to display for this patient. Wt Readings from Last 3 Encounters:   10/18/17 123 lb 3.2 oz (55.9 kg)   17 123 lb (55.8 kg)   17 124 lb 9.6 oz (56.5 kg)     Temp Readings from Last 3 Encounters:   17 97.6 °F (36.4 °C)   17 97.9 °F (36.6 °C) (Oral)   17 97.9 °F (36.6 °C) (Oral)     BP Readings from Last 3 Encounters:   10/18/17 124/76   17 (!) 146/110   17 144/68     Pulse Readings from Last 3 Encounters:   10/18/17 66   17 75   17 61     There were no vitals filed for this visit.       Learning Assessment:  :     Learning Assessment 2014   PRIMARY LEARNER Patient   HIGHEST LEVEL OF EDUCATION - PRIMARY LEARNER  DID NOT GRADUATE HIGH SCHOOL   BARRIERS PRIMARY LEARNER NONE   CO-LEARNER CAREGIVER No   PRIMARY LANGUAGE ENGLISH   LEARNER PREFERENCE PRIMARY DEMONSTRATION   ANSWERED BY patient   RELATIONSHIP SELF       Depression Screening:  :     PHQ over the last two weeks 2017   Little interest or pleasure in doing things Not at all   Feeling down, depressed or hopeless Not at all   Total Score PHQ 2 0       Fall Risk Assessment:  : Fall Risk Assessment, last 12 mths 3/6/2017   Able to walk? Yes   Fall in past 12 months? No   Fall with injury? -   Number of falls in past 12 months -   Fall Risk Score -       Abuse Screening:  :     Abuse Screening Questionnaire 3/6/2017 3/16/2016 6/13/2014   Do you ever feel afraid of your partner? N N N   Are you in a relationship with someone who physically or mentally threatens you? N N N   Is it safe for you to go home? Y Y Y       ADL Screening:  :     ADL Assessment 3/6/2017   Feeding yourself No Help Needed   Getting from bed to chair No Help Needed   Getting dressed No Help Needed   Bathing or showering No Help Needed   Walk across the room (includes cane/walker) No Help Needed   Using the telphone No Help Needed   Taking your medications No Help Needed   Preparing meals No Help Needed   Managing money (expenses/bills) No Help Needed   Moderately strenuous housework (laundry) No Help Needed   Shopping for personal items (toiletries/medicines) No Help Needed   Shopping for groceries No Help Needed   Driving No Help Needed   Climbing a flight of stairs No Help Needed   Getting to places beyond walking distances No Help Needed             I have received verbal consent from Lindsay Corea to discuss any/all medical information while they are present in the room. Medication reconciliation up to date and corrected with patient at this time.

## 2018-02-01 NOTE — PROGRESS NOTES
HPI  Ms. Raul Kong is a 65y.o. year old female, she is seen today for follow up HTN, IFG, high cholesterol. Checks bp at home and never higher than 416X systolic. Brings values 129-145/68-91. HR 58-69. Has been well overall. Has mild scratchy throat today. Says son in her 45s lives at home still, has had a fever as high as 101. No chest pain or sob. No gerd symptoms other than may have bloated in abdomen at times after eating. Weight is up 5# since last visit. No dizziness or lightheadedness. Walks for about 30 minutes - goal is 5-6 days per week. Chief Complaint   Patient presents with    Blood Pressure Check     Room 1// NON fasting // does not want flu vaccine     Cholesterol Problem    Blood sugar problem        Prior to Admission medications    Medication Sig Start Date End Date Taking? Authorizing Provider   famotidine (PEPCID) 20 mg tablet Take 1 Tab by mouth two (2) times a day. 2/1/18  Yes Franklin Franks MD   triamcinolone acetonide (KENALOG) 0.1 % ointment Apply to affected area daily as needed 12/15/17  Yes Franklin Franks MD   [facility]) 625 mg tablet TAKE TWO TABLETS BY MOUTH TWICE DAILY WITH FOOD 10/18/17  Yes Debbie Soriano MD   rosuvastatin (CRESTOR) 20 mg tablet Take 1 Tab by mouth nightly. 10/12/17  Yes Franklin Franks MD   losartan (COZAAR) 25 mg tablet Take 0.5 Tabs by mouth daily. 8/28/17  Yes Richard Cortez NP   nicotinic acid (NIACIN) 500 mg tablet Take 500 mg by mouth Daily (before breakfast). Yes Historical Provider   amLODIPine (NORVASC) 5 mg tablet Take 1 Tab by mouth daily. 4/19/17  Yes Debbie Soriano MD   clopidogrel (PLAVIX) 75 mg tab Take 1 Tab by mouth daily. 4/19/17  Yes Debbie Soriano MD   carvedilol (COREG) 6.25 mg tablet Take 1 Tab by mouth two (2) times a day. 4/10/17  Yes Debbie Soriano MD   cholecalciferol, vitamin D3, (VITAMIN D3) 2,000 unit tab Take 1 Tab by mouth daily.    Yes Historical Provider   aspirin 81 mg tablet Take 81 mg by mouth daily. Yes Historical Provider         Allergies   Allergen Reactions    Lisinopril Other (comments)     High K         REVIEW OF SYSTEMS:  Per HPI    PHYSICAL EXAM:  Visit Vitals    /70    Pulse 76    Temp 98.6 °F (37 °C) (Oral)    Resp 14    Ht 5' 3\" (1.6 m)    Wt 128 lb 3.2 oz (58.2 kg)    SpO2 95%    BMI 22.71 kg/m2     Constitutional: Appears well-developed and well-nourished. No distress. HENT:   Head: Normocephalic and atraumatic. Eyes: No scleral icterus. Mouth: OP clear without lesions, no pharyngeal exudate  Neck: no lad, no tm, supple   Cardiovascular: Normal S1/S2, regular rhythm. No murmurs, rubs, or gallops. Pulmonary/Chest: Effort normal and breath sounds normal. No respiratory distress. No wheezes, rhonchi, or rales. Ext: No edema. Neurological: Alert. Psychiatric: Normal mood and affect. Behavior is normal.     Lab Results   Component Value Date/Time    Sodium 141 06/01/2017 03:21 PM    Potassium 4.8 06/01/2017 03:21 PM    Chloride 103 06/01/2017 03:21 PM    CO2 23 06/01/2017 03:21 PM    Anion gap 9 01/17/2014 05:49 PM    Glucose 89 06/01/2017 03:21 PM    BUN 24 06/01/2017 03:21 PM    Creatinine 0.75 06/01/2017 03:21 PM    BUN/Creatinine ratio 32 06/01/2017 03:21 PM    GFR est AA 89 06/01/2017 03:21 PM    GFR est non-AA 77 06/01/2017 03:21 PM    Calcium 9.7 06/01/2017 03:21 PM    Bilirubin, total 0.3 06/01/2017 03:21 PM    AST (SGOT) 18 06/01/2017 03:21 PM    Alk.  phosphatase 102 06/01/2017 03:21 PM    Protein, total 6.8 06/01/2017 03:21 PM    Albumin 4.4 06/01/2017 03:21 PM    Globulin 4.1 01/17/2014 05:49 PM    A-G Ratio 1.8 06/01/2017 03:21 PM    ALT (SGPT) 10 06/01/2017 03:21 PM     Lab Results   Component Value Date/Time    Hemoglobin A1c 5.9 06/01/2017 03:21 PM    Hemoglobin A1c 6.1 08/15/2016 08:02 AM    Hemoglobin A1c 5.9 02/09/2016 09:27 AM      Lab Results   Component Value Date/Time    Cholesterol, total 214 04/14/2017 09:32 AM    HDL Cholesterol 108 04/14/2017 09:32 AM    LDL, calculated 97 04/14/2017 09:32 AM    VLDL, calculated 9 04/14/2017 09:32 AM    Triglyceride 47 04/14/2017 09:32 AM    CHOL/HDL Ratio 3.1 09/28/2011 05:00 AM          ASSESSMENT/PLAN  Diagnoses and all orders for this visit:    1. IFG (impaired fasting glucose)  Will be getting a1c in April prior to visit with Dr. Judge Fallon - continue exercise  2. Essential hypertension  Good control at home - improved from initial on recheck - has some white coat effect  continue current medications   3. CKD (chronic kidney disease), stage III  Creatinine will be drawn with labs in 2 mos  4. Hyperlipidemia, unspecified hyperlipidemia type  Lipids in april  5. Gastroesophageal reflux disease without esophagitis  -     famotidine (PEPCID) 20 mg tablet; Take 1 Tab by mouth two (2) times a day. Controlled on current regimen, continue       There are no preventive care reminders to display for this patient. Follow-up Disposition:  Return in about 6 months (around 8/1/2018) for bp, chol, ifg.       Reviewed plan of care. Patient has provided input and agrees with goals. The nurse provided the patient and/or family with advanced directive information if needed and encouraged the patient to provide a copy to the office when available.

## 2018-02-01 NOTE — PATIENT INSTRUCTIONS

## 2018-02-01 NOTE — MR AVS SNAPSHOT
[address] [place] [address] [address] [place] [address] [phone] Patient: Darcy Jin MRN: [id number] :1940 Visit Information Date & Time Provider Department Dept. Phone Encounter #  
 2018  1:00 PM Rosie Lyon MD Analilia Dawn [phone] [id number] Follow-up Instructions Return in about 6 months (around 2018) for bp, chol, ifg.  
  
Your Appointments 2018 10:00 AM  
ESTABLISHED PATIENT with Gene Watson MD  
[facility] ([facility]) Appt Note: 6 mo f/u per Dr. Eller Alberta [address]  [address] [place]  
[address] [phone] Upcoming Health Maintenance Date Due Pneumococcal 65+ Low/Medium Risk (2 of 2 - PPSV23) 2018 MEDICARE YEARLY EXAM 2018 GLAUCOMA SCREENING Q2Y 2019 DTaP/Tdap/Td series (2 - Td) 2025 COLONOSCOPY 2025 Allergies as of 2018  Review Complete On: 2018 By: Rosie Lyon MD  
  
 Severity Noted Reaction Type Reactions Lisinopril  2015    Other (comments) High K Current Immunizations  Reviewed on 2017 Name Date Tdap 2015 11:00 AM  
 ZZZ-RETIRED (DO NOT USE) Pneumococcal Vaccine (Unspecified Type)  Deferred (Patient Refused) Not reviewed this visit You Were Diagnosed With   
  
 Codes Comments IFG (impaired fasting glucose)    -  Primary ICD-10-CM: R73.01 
ICD-9-CM: 790.21 Essential hypertension     ICD-10-CM: I10 
ICD-9-CM: 401.9 CKD (chronic kidney disease), stage III     ICD-10-CM: N18.3 ICD-9-CM: 207. 3 Hyperlipidemia, unspecified hyperlipidemia type     ICD-10-CM: E78.5 ICD-9-CM: 272.4 ACEI/ARB contraindicated     ICD-10-CM: Z53.09 
ICD-9-CM: V64.1 Gastroesophageal reflux disease without esophagitis     ICD-10-CM: K21.9 ICD-9-CM: 530.81 Vitals BP Pulse Temp Resp Height(growth percentile) Weight(growth percentile) 158/70 76 98.6 °F (37 °C) (Oral) 14 5' 3\" (1.6 m) 128 lb 3.2 oz (58.2 kg) SpO2 BMI OB Status Smoking Status 95% 22.71 kg/m2 Postmenopausal Never Smoker Vitals History BMI and BSA Data Body Mass Index Body Surface Area  
 22.71 kg/m 2 1.61 m 2 Preferred Pharmacy Pharmacy Name Phone [facility] [address] [place] [address] Alver Daniel [phone] Your Updated Medication List  
  
   
This list is accurate as of: 2/1/18  1:18 PM.  Always use your most recent med list. amLODIPine 5 mg tablet Commonly known as:  Ayla Ayush Take 1 Tab by mouth daily. aspirin 81 mg tablet Take 81 mg by mouth daily. carvedilol 6.25 mg tablet Commonly known as:  Hilario Dine Take 1 Tab by mouth two (2) times a day. clopidogrel 75 mg Tab Commonly known as:  PLAVIX Take 1 Tab by mouth daily. colesevelam 625 mg tablet Commonly known as:  WELCHOL  
TAKE TWO TABLETS BY MOUTH TWICE DAILY WITH FOOD  
  
 famotidine 20 mg tablet Commonly known as:  PEPCID Take 1 Tab by mouth two (2) times a day. losartan 25 mg tablet Commonly known as:  COZAAR Take 0.5 Tabs by mouth daily. nicotinic acid 500 mg tablet Commonly known as:  NIACIN Take 500 mg by mouth Daily (before breakfast). rosuvastatin 20 mg tablet Commonly known as:  CRESTOR Take 1 Tab by mouth nightly. triamcinolone acetonide 0.1 % ointment Commonly known as:  KENALOG Apply to affected area daily as needed VITAMIN D3 2,000 unit Tab Generic drug:  cholecalciferol (vitamin D3) Take 1 Tab by mouth daily. Prescriptions Sent to Pharmacy Refills  
 famotidine (PEPCID) 20 mg tablet 3 Sig: Take 1 Tab by mouth two (2) times a day.   
 Class: Normal  
 Pharmacy: [address] [place] [address] [id number] Ph #: [phone] Route: Oral  
  
Follow-up Instructions Return in about 6 months (around 8/1/2018) for bp, chol, ifg.  
  
  
Patient Instructions DASH Diet: Care Instructions Your Care Instructions The DASH diet is an eating plan that can help lower your blood pressure. DASH stands for Dietary Approaches to Stop Hypertension. Hypertension is high blood pressure. The DASH diet focuses on eating foods that are high in calcium, potassium, and magnesium. These nutrients can lower blood pressure. The foods that are highest in these nutrients are fruits, vegetables, low-fat dairy products, nuts, seeds, and legumes. But taking calcium, potassium, and magnesium supplements instead of eating foods that are high in those nutrients does not have the same effect. The DASH diet also includes whole grains, fish, and poultry. The DASH diet is one of several lifestyle changes your doctor may recommend to lower your high blood pressure. Your doctor may also want you to decrease the amount of sodium in your diet. Lowering sodium while following the DASH diet can lower blood pressure even further than just the DASH diet alone. Follow-up care is a key part of your treatment and safety. Be sure to make and go to all appointments, and call your doctor if you are having problems. It's also a good idea to know your test results and keep a list of the medicines you take. How can you care for yourself at home? Following the DASH diet · Eat 4 to 5 servings of fruit each day. A serving is 1 medium-sized piece of fruit, ½ cup chopped or canned fruit, 1/4 cup dried fruit, or 4 ounces (½ cup) of fruit juice. Choose fruit more often than fruit juice. · Eat 4 to 5 servings of vegetables each day. A serving is 1 cup of lettuce or raw leafy vegetables, ½ cup of chopped or cooked vegetables, or 4 ounces (½ cup) of vegetable juice. Choose vegetables more often than vegetable juice. · Get 2 to 3 servings of low-fat and fat-free dairy each day. A serving is 8 ounces of milk, 1 cup of yogurt, or 1 ½ ounces of cheese. · Eat 6 to 8 servings of grains each day. A serving is 1 slice of bread, 1 ounce of dry cereal, or ½ cup of cooked rice, pasta, or cooked cereal. Try to choose whole-grain products as much as possible. · Limit lean meat, poultry, and fish to 2 servings each day. A serving is 3 ounces, about the size of a deck of cards. · Eat 4 to 5 servings of nuts, seeds, and legumes (cooked dried beans, lentils, and split peas) each week. A serving is 1/3 cup of nuts, 2 tablespoons of seeds, or ½ cup of cooked beans or peas. · Limit fats and oils to 2 to 3 servings each day. A serving is 1 teaspoon of vegetable oil or 2 tablespoons of salad dressing. · Limit sweets and added sugars to 5 servings or less a week. A serving is 1 tablespoon jelly or jam, ½ cup sorbet, or 1 cup of lemonade. · Eat less than 2,300 milligrams (mg) of sodium a day. If you limit your sodium to 1,500 mg a day, you can lower your blood pressure even more. Tips for success · Start small. Do not try to make dramatic changes to your diet all at once. You might feel that you are missing out on your favorite foods and then be more likely to not follow the plan. Make small changes, and stick with them. Once those changes become habit, add a few more changes. · Try some of the following: ¨ Make it a goal to eat a fruit or vegetable at every meal and at snacks. This will make it easy to get the recommended amount of fruits and vegetables each day. ¨ Try yogurt topped with fruit and nuts for a snack or healthy dessert. ¨ Add lettuce, tomato, cucumber, and onion to sandwiches. ¨ Combine a ready-made pizza crust with low-fat mozzarella cheese and lots of vegetable toppings. Try using tomatoes, squash, spinach, broccoli, carrots, cauliflower, and onions. ¨ Have a variety of cut-up vegetables with a low-fat dip as an appetizer instead of chips and dip. ¨ Sprinkle sunflower seeds or chopped almonds over salads. Or try adding chopped walnuts or almonds to cooked vegetables. ¨ Try some vegetarian meals using beans and peas. Add garbanzo or kidney beans to salads. Make burritos and tacos with mashed hernandez beans or black beans. Where can you learn more? Go to http://mian-joseph.info/. Enter L070 in the search box to learn more about \"DASH Diet: Care Instructions. \" Current as of: September 21, 2016 Content Version: 11.4 © 0945-3013 [employer]. Care instructions adapted under license by [employer] (which disclaims liability or warranty for this information). If you have questions about a medical condition or this instruction, always ask your healthcare professional. [place] [address] any warranty or liability for your use of this information. Introducing [facility] & HEALTH SERVICES! [facility] introduces [employer] patient portal. Now you can access parts of your medical record, email your doctor's office, and request medication refills online. 1. In your internet browser, go to [email or web address] 2. Click on the First Time User? Click Here link in the Sign In box. You will see the New Member Sign Up page. 3. Enter your [employer] Access Code exactly as it appears below. You will not need to use this code after youve completed the sign-up process. If you do not sign up before the expiration date, you must request a new code. · [employer] Access Code: [email or web address] Expires: 5/2/2018  1:18 PM 
 
4. Enter the last four digits of your Social Security Number (xxxx) and Date of Birth (mm/dd/yyyy) as indicated and click Submit. You will be taken to the next sign-up page. 5. Create a [employer] ID.  This will be your [employer] login ID and cannot be changed, so think of one that is secure and easy to remember. 6. Create a SOS Online Backup password. You can change your password at any time. 7. Enter your Password Reset Question and Answer. This can be used at a later time if you forget your password. 8. Enter your e-mail address. You will receive e-mail notification when new information is available in [address] 9. Click Sign Up. You can now view and download portions of your medical record. 10. Click the Download Summary menu link to download a portable copy of your medical information. If you have questions, please visit the Frequently Asked Questions section of the [employer] Backup website. Remember, [employer] Back[employer] is NOT to be used for urgent needs. For medical emergencies, dial 911. Now available from your iPhone and Android! Please provide this summary of care documentation to your next provider. Your primary care clinician is listed as Jason Sinha. If you have any questions after today's visit, please call [phone].

## 2018-04-13 ENCOUNTER — HOSPITAL ENCOUNTER (OUTPATIENT)
Dept: LAB | Age: 78
Discharge: HOME | End: 2018-04-13
Payer: MEDICARE

## 2018-04-13 PROCEDURE — 80061 LIPID PANEL: CPT

## 2018-04-13 PROCEDURE — 83036 HEMOGLOBIN GLYCOSYLATED A1C: CPT

## 2018-04-13 PROCEDURE — 80053 COMPREHEN METABOLIC PANEL: CPT

## 2018-04-13 PROCEDURE — 36415 COLL VENOUS BLD VENIPUNCTURE: CPT

## 2018-04-17 LAB
ALBUMIN SERPL-MCNC: 4.2 G/DL (ref 3.5–4.8)
ALBUMIN/GLOB SERPL: 1.8 {RATIO} (ref 1.2–2.2)
ALP SERPL-CCNC: 107 IU/L (ref 39–117)
ALT SERPL-CCNC: 15 IU/L (ref 0–32)
AST SERPL-CCNC: 21 IU/L (ref 0–40)
BILIRUB SERPL-MCNC: 0.5 MG/DL (ref 0–1.2)
BUN SERPL-MCNC: 18 MG/DL (ref 8–27)
BUN/CREAT SERPL: 23 (ref 12–28)
CALCIUM SERPL-MCNC: 9.5 MG/DL (ref 8.7–10.3)
CHLORIDE SERPL-SCNC: 103 MMOL/L (ref 96–106)
CHOLEST SERPL-MCNC: 202 MG/DL (ref 100–199)
CO2 SERPL-SCNC: 24 MMOL/L (ref 18–29)
CREAT SERPL-MCNC: 0.78 MG/DL (ref 0.57–1)
EST. AVERAGE GLUCOSE BLD GHB EST-MCNC: 117 MG/DL
GFR SERPLBLD CREATININE-BSD FMLA CKD-EPI: 73 ML/MIN/1.73
GFR SERPLBLD CREATININE-BSD FMLA CKD-EPI: 84 ML/MIN/1.73
GLOBULIN SER CALC-MCNC: 2.3 G/DL (ref 1.5–4.5)
GLUCOSE SERPL-MCNC: 101 MG/DL (ref 65–99)
HBA1C MFR BLD: 5.7 % (ref 4.8–5.6)
HDLC SERPL-MCNC: 81 MG/DL
INTERPRETATION, 910389: NORMAL
LDLC SERPL CALC-MCNC: 109 MG/DL (ref 0–99)
POTASSIUM SERPL-SCNC: 5.2 MMOL/L (ref 3.5–5.2)
PROT SERPL-MCNC: 6.5 G/DL (ref 6–8.5)
SODIUM SERPL-SCNC: 142 MMOL/L (ref 134–144)
TRIGL SERPL-MCNC: 61 MG/DL (ref 0–149)
VLDLC SERPL CALC-MCNC: 12 MG/DL (ref 5–40)

## 2018-04-18 ENCOUNTER — OFFICE VISIT (OUTPATIENT)
Dept: CARDIOLOGY CLINIC | Age: 78
End: 2018-04-18

## 2018-04-18 VITALS
DIASTOLIC BLOOD PRESSURE: 60 MMHG | RESPIRATION RATE: 16 BRPM | BODY MASS INDEX: 21.62 KG/M2 | WEIGHT: 122 LBS | HEIGHT: 63 IN | HEART RATE: 62 BPM | SYSTOLIC BLOOD PRESSURE: 122 MMHG

## 2018-04-18 DIAGNOSIS — I25.111 CORONARY ARTERY DISEASE INVOLVING NATIVE CORONARY ARTERY OF NATIVE HEART WITH ANGINA PECTORIS WITH DOCUMENTED SPASM (HCC): ICD-10-CM

## 2018-04-18 DIAGNOSIS — R73.01 IFG (IMPAIRED FASTING GLUCOSE): ICD-10-CM

## 2018-04-18 DIAGNOSIS — I10 ESSENTIAL HYPERTENSION: Primary | ICD-10-CM

## 2018-04-18 DIAGNOSIS — I25.10 CORONARY ARTERY DISEASE INVOLVING NATIVE CORONARY ARTERY OF NATIVE HEART WITHOUT ANGINA PECTORIS: ICD-10-CM

## 2018-04-18 DIAGNOSIS — R00.2 HEART PALPITATIONS: ICD-10-CM

## 2018-04-18 DIAGNOSIS — N18.30 CKD (CHRONIC KIDNEY DISEASE), STAGE III (HCC): ICD-10-CM

## 2018-04-18 RX ORDER — AMLODIPINE BESYLATE 5 MG/1
5 TABLET ORAL DAILY
Qty: 90 TAB | Refills: 3 | Status: SHIPPED | OUTPATIENT
Start: 2018-04-18 | End: 2018-11-15 | Stop reason: SDUPTHER

## 2018-04-18 NOTE — MR AVS SNAPSHOT
[address] [facility] [address] [place] 
[phone] Patient: Rubio Peralta MRN: [id number] :1940 Visit Information Date & Time Provider Department Dept. Phone Encounter #  
 2018 10:00 AM Phillip Cruz MD [facility] [address] [phone] [id number] Your Appointments 2018  8:00 AM  
NUCLEAR MEDICINE with ASHU RAMAN [facility] ([facility] SCHEDULING) Appt Note: 1 day Lexiscan for CAD, chest discomfort ht 5'3 wt 122 [facility] [address] [place]  
[address] [facility] [address]  
  
    
 2018  9:00 AM  
ROUTINE CARE with Estrada Rush MD  
[facility] [address]) Appt Note: 6mth f/u;  bp, chol, ifg  
 [address] [facility] [address] [place] [address] [phone]  
  
   
 [address] [place] [address]  
  
    
 10/19/2018 10:00 AM  
ESTABLISHED PATIENT with Phillip Cruz MD  
[facility] ([facility]) Appt Note: 6 mo f/u per Dr. Leland Christine [address] [place] [address]  
[address] [phone] Upcoming Health Maintenance Date Due Pneumococcal 65+ Low/Medium Risk (2 of 2 - PPSV23) 2018 MEDICARE YEARLY EXAM 2018 GLAUCOMA SCREENING Q2Y 2019 DTaP/Tdap/Td series (2 - Td) 2025 COLONOSCOPY 2025 Allergies as of 2018  Review Complete On: 2018 By: Thania Sparks Severity Noted Reaction Type Reactions Lisinopril  2015    Other (comments) High K Current Immunizations  Reviewed on 2017 Name Date Tdap 2015 11:00 AM  
 ZZZ-RETIRED (DO NOT USE) Pneumococcal Vaccine (Unspecified Type)  Deferred (Patient Refused) Not reviewed this visit You Were Diagnosed With   
  
 Codes Comments Essential hypertension    -  Primary ICD-10-CM: I10 
ICD-9-CM: 401.9 Coronary artery disease involving native coronary artery of native heart without angina pectoris     ICD-10-CM: I25.10 ICD-9-CM: 414.01 Coronary artery disease involving native coronary artery of native heart with angina pectoris with documented spasm ([facility] [address]     ICD-10-CM: I25.111 ICD-9-CM: 414.01, 413.9 Heart palpitations     ICD-10-CM: R00.2 ICD-9-CM: 785.1 CKD (chronic kidney disease), stage III     ICD-10-CM: N18.3 ICD-9-CM: 585.3 IFG (impaired fasting glucose)     ICD-10-CM: R73.01 
ICD-9-CM: 790.21 Vitals BP Pulse Resp Height(growth percentile) Weight(growth percentile) BMI  
 122/60 (BP 1 Location: Right arm, BP Patient Position: Sitting) 62 16 5' 3\" (1.6 m) 122 lb (55.3 kg) 21.61 kg/m2 OB Status Smoking Status Postmenopausal Never Smoker Vitals History BMI and BSA Data Body Mass Index Body Surface Area  
 21.61 kg/m 2 1.57 m 2 Preferred Pharmacy Pharmacy Name Phone [facility] [address] [place] [phone] Your Updated Medication List  
  
   
This list is accurate as of 4/18/18 10:18 AM.  Always use your most recent med list. amLODIPine 5 mg tablet Commonly known as:  Shruthi Grissom Take 1 Tab by mouth daily. aspirin 81 mg tablet Take 81 mg by mouth daily. carvedilol 6.25 mg tablet Commonly known as:  Hayes Pennant Take 1 Tab by mouth two (2) times a day. clopidogrel 75 mg Tab Commonly known as:  PLAVIX Take 1 Tab by mouth daily. colesevelam 625 mg tablet Commonly known as:  WELCHOL  
TAKE TWO TABLETS BY MOUTH TWICE DAILY WITH FOOD  
  
 famotidine 20 mg tablet Commonly known as:  PEPCID Take 1 Tab by mouth two (2) times a day. losartan 25 mg tablet Commonly known as:  COZAAR  
 Take 0.5 Tabs by mouth daily. rosuvastatin 20 mg tablet Commonly known as:  CRESTOR Take 1 Tab by mouth nightly. triamcinolone acetonide 0.1 % ointment Commonly known as:  KENALOG Apply to affected area daily as needed VITAMIN D3 2,000 unit Tab Generic drug:  cholecalciferol (vitamin D3) Take 1 Tab by mouth daily. Prescriptions Sent to Pharmacy Refills  
 amLODIPine (NORVASC) 5 mg tablet 3 Sig: Take 1 Tab by mouth daily. Class: Normal  
 Pharmacy: [address] #: [phone] Route: Oral  
  
Introducing [facility] & HEALTH SERVICES! [facility] introduces [employer] patient portal. Now you can access parts of your medical record, email your doctor's office, and request medication refills online. 1. In your internet browser, go to [email or web address] 2. Click on the First Time User? Click Here link in the Sign In box. You will see the New Member Sign Up page. 3. Enter your [employer] Access Code exactly as it appears below. You will not need to use this code after youve completed the sign-up process. If you do not sign up before the expiration date, you must request a new code. · [employer] Access Code: [email or web address] Expires: 5/2/2018  2:18 PM 
 
4. Enter the last four digits of your Social Security Number (xxxx) and Date of Birth (mm/dd/yyyy) as indicated and click Submit. You will be taken to the next sign-up page. 5. Create a [employer]ytic[employer] ID. This will be your [employer] login ID and cannot be changed, so think of one that is secure and easy to remember. 6. Create a [employer] password. You can change your password at any time. 7. Enter your Password Reset Question and Answer. This can be used at a later time if you forget your password. 8. Enter your e-mail address. You will receive e-mail notification when new information is available in [address] 9. Click Sign Up. You can now view and download portions of your medical record. 10. Click the Download Summary menu link to download a portable copy of your medical information. If you have questions, please visit the Frequently Asked Questions section of the [employer] website. Remember, [employer] is NOT to be used for urgent needs. For medical emergencies, dial 911. Now available from your iPhone and Android! Please provide this summary of care documentation to your next provider. Your primary care clinician is listed as Jason Sinha. If you have any questions after today's visit, please call [phone].

## 2018-04-18 NOTE — PROGRESS NOTES
[facility]: Gertrude Hua  [phone]    HPI: Lady Worthington, a 65y.o. year-old who presents for follow up regarding her CAD. Bp looks great today but mostly 130s at home, not eating well the last few days due to the sniffles. Walks but not as much in the weather. Continues to walk regularly, leg is better now. She thinks she is having trouble with her memory. Leg cramps are much better now. .     She continues to walk regularly without symptoms and feels well with it. Walks 30 min a day 5-6 days a week. No blood in the stool or in the urine. She is feeling tightness in the chest after working outside, walking long time, goes up into the chest. Lasts a while and goes away with rest. Given these new sx will get lexiscan nuc to evalaute for cad progression, She can not walk on the treadmill. Old HDL labs:    HDL 75 Tg 78  High ApoB, LDLp SdLDL lpa >225!!!!!  Nl Lpla2, bnp 194, Normal aspirin works  Insulin normal, glucose 109, Homocysteine 19  Vit D 32  LFT/CMp normal    Assessment/Plan:  1. HTN-at goal today on losartan, coreg, amlodipine. 2. CAD- stable, s/p TRANG RCA and midLAD 3/11   -Cath 8/12 with proximal RCA stent,  No chest pain now  -lifelong plavix and aspirin(her decision after discussion of risks and benefits)  3. Palpitations- better on coreg, still v-bigemeny at last holter  4. Hyperlipidemia- crestor 40, welchol, trilipix. - now   -no Zetia due to cost.   5. Anxiety- better since she stopped checking her bp. 6. Reflux- on Nexium, and stable  7. Vit D deficiency 2000u/day, continue, at goal now    8. Impaired glucose tolerance- a1C  Still good  9. CKD Stage 3-stable now, improved, seen by Dr. Gwendolyn Hector. -hyperkalemia in the past improved with stopping spironolactone, 4.8 now, also cut losartan for same  10.  Mild to moderate mitral regurgitation- by TTE 11/15    Stress nuc 6/16 wnl  Echo 11/15 EF 60%, mild TR, PAP 25, mild-mod MR  Carotid US: Mod LICA plaque <76%, no evidence of Subclavian Steal.  Stress nuc: 1/14 normal, EF 69%  Echo 9/12 EF 60, mod MR, mild TR  Cath 2012 RCA stent  Fhx no early cad  Soc no tob, no etoh    She  has a past medical history of CAD (coronary artery disease); CKD (chronic kidney disease), stage III (9/26/2014); HTN (hypertension) (10/1/2010); Hyperlipidemia; and Hypertension. Cardiovascular ROS: positive for - chest pain  Respiratory ROS: negative for - cough, hemoptysis, pleuritic pain or shortness of breath  Neurological ROS: no TIA or stroke symptoms  All other systems negative except as above. PE  Vitals:    04/18/18 0951   BP: 122/60   Pulse: 62   Resp: 16   Weight: 122 lb (55.3 kg)   Height: 5' 3\" (1.6 m)      Body mass index is 21.61 kg/(m^2).    General appearance - alert, well appearing, and in no distress  Mental status - affect appropriate to mood  Eyes - sclera anicteric, moist mucous membranes  Neck - supple, no significant adenopathy  Lymphatics - no  lymphadenopathy  Chest - clear to auscultation, no wheezes, rales or rhonchi  Heart - normal rate, regular rhythm, normal S1, S2, no murmurs, rubs, clicks or gallops  Abdomen - soft, nontender, nondistended, no masses or organomegaly  Back exam - full range of motion, no tenderness  Neurological - cranial nerves II through XII grossly intact, no focal deficit  Musculoskeletal - no muscular tenderness noted, normal strength  Extremities - peripheral pulses normal, no pedal edema  Skin - normal coloration  no rashes    Recent Labs:  Lab Results   Component Value Date/Time    Cholesterol, total 202 (H) 04/13/2018 09:24 AM    HDL Cholesterol 81 04/13/2018 09:24 AM    LDL, calculated 109 (H) 04/13/2018 09:24 AM    Triglyceride 61 04/13/2018 09:24 AM    CHOL/HDL Ratio 3.1 09/28/2011 05:00 AM       Lab Results   Component Value Date/Time    Creatinine 0.78 04/13/2018 09:24 AM       Lab Results   Component Value Date/Time    BUN 18 04/13/2018 09:24 AM       Lab Results Component Value Date/Time    Potassium 5.2 04/13/2018 09:24 AM       Lab Results   Component Value Date/Time    Hemoglobin A1c 5.7 (H) 04/13/2018 09:24 AM       Lab Results   Component Value Date/Time    HGB 12.8 06/13/2014 11:17 AM       Lab Results   Component Value Date/Time    PLATELET 222 98/72/9591 11:17 AM         Reviewed:  Past Medical History:   Diagnosis Date    CAD (coronary artery disease)     CKD (chronic kidney disease), stage III 9/26/2014    Followed by Dr. 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  carvedilol (COREG) 6.25 mg tablet Take 1 Tab by mouth two (2) times a day.  famotidine (PEPCID) 20 mg tablet Take 1 Tab by mouth two (2) times a day.  colesevelam (WELCHOL) 625 mg tablet TAKE TWO TABLETS BY MOUTH TWICE DAILY WITH FOOD    rosuvastatin (CRESTOR) 20 mg tablet Take 1 Tab by mouth nightly.  losartan (COZAAR) 25 mg tablet Take 0.5 Tabs by mouth daily.  nicotinic acid (NIACIN) 500 mg tablet Take 500 mg by mouth Daily (before breakfast).  amLODIPine (NORVASC) 5 mg tablet Take 1 Tab by mouth daily.  cholecalciferol, vitamin D3, (VITAMIN D3) 2,000 unit tab Take 1 Tab by mouth daily.  aspirin 81 mg tablet Take 81 mg by mouth daily.  triamcinolone acetonide (KENALOG) 0.1 % ointment Apply to affected area daily as needed     No current facility-administered medications for this visit.           MD [facility]ille [facility] [place]  [address] [facility]  [address]

## 2018-04-25 ENCOUNTER — CLINICAL SUPPORT (OUTPATIENT)
Dept: CARDIOLOGY CLINIC | Age: 78
End: 2018-04-25

## 2018-04-25 DIAGNOSIS — R07.9 CHEST PAIN, UNSPECIFIED TYPE: ICD-10-CM

## 2018-04-25 DIAGNOSIS — R73.01 IFG (IMPAIRED FASTING GLUCOSE): ICD-10-CM

## 2018-04-25 DIAGNOSIS — Z98.61 HISTORY OF PTCA: ICD-10-CM

## 2018-04-25 DIAGNOSIS — N18.30 CKD (CHRONIC KIDNEY DISEASE), STAGE III (HCC): ICD-10-CM

## 2018-04-25 DIAGNOSIS — I10 ESSENTIAL HYPERTENSION: ICD-10-CM

## 2018-04-25 DIAGNOSIS — R00.2 HEART PALPITATIONS: ICD-10-CM

## 2018-04-25 DIAGNOSIS — I25.111 CORONARY ARTERY DISEASE INVOLVING NATIVE CORONARY ARTERY OF NATIVE HEART WITH ANGINA PECTORIS WITH DOCUMENTED SPASM (HCC): ICD-10-CM

## 2018-04-25 DIAGNOSIS — I25.10 CORONARY ARTERY DISEASE INVOLVING NATIVE CORONARY ARTERY OF NATIVE HEART WITHOUT ANGINA PECTORIS: ICD-10-CM

## 2018-04-25 NOTE — PROGRESS NOTES
See scanned document. Ordering Doctor:Dr. Leonidas Evangelista  Reading Doctor:Dr. Leonidas Evangelista    Patient tolerated procedure well.

## 2018-04-26 ENCOUNTER — TELEPHONE (OUTPATIENT)
Dept: CARDIOLOGY CLINIC | Age: 78
End: 2018-04-26

## 2018-04-26 NOTE — TELEPHONE ENCOUNTER
Spoke to patient and advised her that her nuclear stress test is normal, patient verbalized understanding. Patient id verified x2.

## 2018-08-10 ENCOUNTER — OFFICE VISIT (OUTPATIENT)
Dept: INTERNAL MEDICINE CLINIC | Facility: CLINIC | Age: 78
End: 2018-08-10

## 2018-08-10 VITALS
HEIGHT: 63 IN | DIASTOLIC BLOOD PRESSURE: 74 MMHG | BODY MASS INDEX: 21.48 KG/M2 | WEIGHT: 121.2 LBS | HEART RATE: 65 BPM | SYSTOLIC BLOOD PRESSURE: 144 MMHG | TEMPERATURE: 97.6 F | OXYGEN SATURATION: 98 % | RESPIRATION RATE: 16 BRPM

## 2018-08-10 DIAGNOSIS — Z00.00 MEDICARE ANNUAL WELLNESS VISIT, SUBSEQUENT: Primary | ICD-10-CM

## 2018-08-10 DIAGNOSIS — Z13.39 SCREENING FOR ALCOHOLISM: ICD-10-CM

## 2018-08-10 DIAGNOSIS — N18.30 CKD (CHRONIC KIDNEY DISEASE), STAGE III (HCC): ICD-10-CM

## 2018-08-10 DIAGNOSIS — I10 ESSENTIAL HYPERTENSION: ICD-10-CM

## 2018-08-10 DIAGNOSIS — R53.83 FATIGUE, UNSPECIFIED TYPE: ICD-10-CM

## 2018-08-10 DIAGNOSIS — E78.5 HYPERLIPIDEMIA, UNSPECIFIED HYPERLIPIDEMIA TYPE: ICD-10-CM

## 2018-08-10 DIAGNOSIS — Z71.89 ADVANCED CARE PLANNING/COUNSELING DISCUSSION: ICD-10-CM

## 2018-08-10 DIAGNOSIS — Z13.31 SCREENING FOR DEPRESSION: ICD-10-CM

## 2018-08-10 DIAGNOSIS — K21.9 GASTROESOPHAGEAL REFLUX DISEASE, ESOPHAGITIS PRESENCE NOT SPECIFIED: ICD-10-CM

## 2018-08-10 DIAGNOSIS — R73.01 IFG (IMPAIRED FASTING GLUCOSE): ICD-10-CM

## 2018-08-10 RX ORDER — GLUCOSAMINE/CHONDR SU A SOD 167-133 MG
500 CAPSULE ORAL
COMMUNITY

## 2018-08-10 NOTE — PROGRESS NOTES
Vera Said  Identified pt with two pt identifiers(name and ). Chief Complaint   Patient presents with    Blood Pressure Check     Room 2A//     Cholesterol Problem    Blood sugar problem       1. Have you been to the ER, urgent care clinic since your last visit? Hospitalized since your last visit? NO    2. Have you seen or consulted any other health care providers outside of the [facility] since your last visit? Include any pap smears or colon screening. NO      Provider notified of reason for visit, vitals and flowsheets obtained on patients. Patient received paperwork for advance directive during previous visit but has not completed at this time     Reviewed record In preparation for visit, huddled with provider and have obtained necessary documentation      Health Maintenance Due   Topic    Pneumococcal 65+ Low/Medium Risk (2 of 2 - PPSV23)    MEDICARE YEARLY EXAM        Wt Readings from Last 3 Encounters:   18 122 lb (55.3 kg)   18 128 lb 3.2 oz (58.2 kg)   10/18/17 123 lb 3.2 oz (55.9 kg)     Temp Readings from Last 3 Encounters:   18 98.6 °F (37 °C) (Oral)   17 97.6 °F (36.4 °C)   17 97.9 °F (36.6 °C) (Oral)     BP Readings from Last 3 Encounters:   18 122/60   18 158/70   10/18/17 124/76     Pulse Readings from Last 3 Encounters:   18 62   18 76   10/18/17 66     There were no vitals filed for this visit.       Learning Assessment:  :     Learning Assessment 2014   PRIMARY LEARNER Patient   HIGHEST LEVEL OF EDUCATION - PRIMARY LEARNER  DID NOT GRADUATE HIGH SCHOOL   BARRIERS PRIMARY LEARNER NONE   CO-LEARNER CAREGIVER No   PRIMARY LANGUAGE ENGLISH   LEARNER PREFERENCE PRIMARY DEMONSTRATION   ANSWERED BY patient   RELATIONSHIP SELF       Depression Screening:  :     PHQ over the last two weeks 8/10/2018   Little interest or pleasure in doing things Not at all   Feeling down, depressed, irritable, or hopeless Not at all Total Score PHQ 2 0       Fall Risk Assessment:  :     Fall Risk Assessment, last 12 mths 8/10/2018   Able to walk? Yes   Fall in past 12 months? No   Fall with injury? -   Number of falls in past 12 months -   Fall Risk Score -       Abuse Screening:  :     Abuse Screening Questionnaire 8/10/2018 3/6/2017 3/16/2016 6/13/2014   Do you ever feel afraid of your partner? N N N N   Are you in a relationship with someone who physically or mentally threatens you? N N N N   Is it safe for you to go home? Y Y Y Y       ADL Screening:  :     ADL Assessment 3/6/2017   Feeding yourself No Help Needed   Getting from bed to chair No Help Needed   Getting dressed No Help Needed   Bathing or showering No Help Needed   Walk across the room (includes cane/walker) No Help Needed   Using the telphone No Help Needed   Taking your medications No Help Needed   Preparing meals No Help Needed   Managing money (expenses/bills) No Help Needed   Moderately strenuous housework (laundry) No Help Needed   Shopping for personal items (toiletries/medicines) No Help Needed   Shopping for groceries No Help Needed   Driving No Help Needed   Climbing a flight of stairs No Help Needed   Getting to places beyond walking distances No Help Needed         Medication reconciliation up to date and corrected with patient at this time.

## 2018-08-10 NOTE — PROGRESS NOTES
HPI  Ms. Radha Barrow is a 65y.o. year old female, she is seen today for follow up HTN, high cholesterol, IFG, AWV. Brings recent bp lo-141/78-87 with HR 59-65   Feels tired more often, but notes if she feels tired for example while walking will keep moving and actually feel better. No chest pain or sob. Is sleeping well at night. Some mornings wakes feeling rested, not all morning. Some days better than others, no pattern. Appetite good. Not sad, down or depressed. Chief Complaint   Patient presents with    Blood Pressure Check     Room 2A// NON fasting // concerned over her fluctuating BP \"don't feel right\"    Cholesterol Problem    Blood sugar problem    Annual Wellness Visit        Prior to Admission medications    Medication Sig Start Date End Date Taking? Authorizing Provider   nicotinic acid (NIACIN) 500 mg tablet Take 500 mg by mouth Daily (before breakfast). Yes Historical Provider   amLODIPine (NORVASC) 5 mg tablet Take 1 Tab by mouth daily. 18  Yes Wes Miranda MD   clopidogrel (PLAVIX) 75 mg tab Take 1 Tab by mouth daily. 18  Yes Wes Miranda MD   carvedilol (COREG) 6.25 mg tablet Take 1 Tab by mouth two (2) times a day. 18  Yes Wes Miranda MD   famotidine (PEPCID) 20 mg tablet Take 1 Tab by mouth two (2) times a day. 18  Yes Cruz Almaraz MD   [facility]) 625 mg tablet TAKE TWO TABLETS BY MOUTH TWICE DAILY WITH FOOD 10/18/17  Yes Wes Miranda MD   rosuvastatin (CRESTOR) 20 mg tablet Take 1 Tab by mouth nightly. 10/12/17  Yes Cruz Almaraz MD   losartan (COZAAR) 25 mg tablet Take 0.5 Tabs by mouth daily. 17  Yes Rufina Cespedes NP   cholecalciferol, vitamin D3, (VITAMIN D3) 2,000 unit tab Take 1 Tab by mouth daily. Yes Historical Provider   aspirin 81 mg tablet Take 81 mg by mouth daily.    Yes Historical Provider   triamcinolone acetonide (KENALOG) 0.1 % ointment Apply to affected area daily as needed 12/15/17   Bernarda Cardona MD         Allergies   Allergen Reactions    Lisinopril Other (comments)     High K         REVIEW OF SYSTEMS:  Per HPI    PHYSICAL EXAM:  Visit Vitals    /74    Pulse 65    Temp 97.6 °F (36.4 °C) (Oral)    Resp 16    Ht 5' 3\" (1.6 m)    Wt 121 lb 3.2 oz (55 kg)    SpO2 98%    BMI 21.47 kg/m2     Constitutional: Appears well-developed and well-nourished. No distress. HENT:   Head: Normocephalic and atraumatic. Eyes: No scleral icterus. Cardiovascular: Normal S1/S2, regular rhythm. No murmurs, rubs, or gallops. Pulmonary/Chest: Effort normal and breath sounds normal. No respiratory distress. No wheezes, rhonchi, or rales. Ext: No edema. Neurological: Alert. Psychiatric: Normal mood and affect. Behavior is normal.     Lab Results   Component Value Date/Time    Sodium 142 04/13/2018 09:24 AM    Potassium 5.2 04/13/2018 09:24 AM    Chloride 103 04/13/2018 09:24 AM    CO2 24 04/13/2018 09:24 AM    Anion gap 9 01/17/2014 05:49 PM    Glucose 101 (H) 04/13/2018 09:24 AM    BUN 18 04/13/2018 09:24 AM    Creatinine 0.78 04/13/2018 09:24 AM    BUN/Creatinine ratio 23 04/13/2018 09:24 AM    GFR est AA 84 04/13/2018 09:24 AM    GFR est non-AA 73 04/13/2018 09:24 AM    Calcium 9.5 04/13/2018 09:24 AM    Bilirubin, total 0.5 04/13/2018 09:24 AM    AST (SGOT) 21 04/13/2018 09:24 AM    Alk.  phosphatase 107 04/13/2018 09:24 AM    Protein, total 6.5 04/13/2018 09:24 AM    Albumin 4.2 04/13/2018 09:24 AM    Globulin 4.1 (H) 01/17/2014 05:49 PM    A-G Ratio 1.8 04/13/2018 09:24 AM    ALT (SGPT) 15 04/13/2018 09:24 AM     Lab Results   Component Value Date/Time    Hemoglobin A1c 5.7 (H) 04/13/2018 09:24 AM    Hemoglobin A1c 5.9 (H) 06/01/2017 03:21 PM    Hemoglobin A1c 6.1 (H) 08/15/2016 08:02 AM      Lab Results   Component Value Date/Time    Cholesterol, total 202 (H) 04/13/2018 09:24 AM    HDL Cholesterol 81 04/13/2018 09:24 AM    LDL, calculated 109 (H) 04/13/2018 09:24 AM    VLDL, calculated 12 04/13/2018 09:24 AM    Triglyceride 61 04/13/2018 09:24 AM    CHOL/HDL Ratio 3.1 09/28/2011 05:00 AM          ASSESSMENT/PLAN  Diagnoses and all orders for this visit:    1. Medicare annual wellness visit, subsequent    2. Hyperlipidemia, unspecified hyperlipidemia type  -     METABOLIC PANEL, COMPREHENSIVE  Not fasting - will get lipids in future with Dr. Chai Santamaria  3. CKD (chronic kidney disease), stage III  Creatinine has been stable - check today  4. IFG (impaired fasting glucose)  -     HEMOGLOBIN A1C WITH EAG    5. Screening for alcoholism  -     Annual  Alcohol Screen 15 min ()    6. Screening for depression  -     Depression Screen Annual    7. Essential hypertension  Better control at home - continue current medications - continue to monitor at home  8. Gastroesophageal reflux disease, esophagitis presence not specified  Controlled on current regimen, continue   9. Advanced care planning/counseling discussion    10. Fatigue, unspecified type  -     TSH RFX ON ABNORMAL TO FREE T4  -     CBC WITH AUTOMATED DIFF          Health Maintenance Due   Topic Date Due    Pneumococcal 65+ Low/Medium Risk (2 of 2 - PPSV23) 06/01/2018        Follow-up Disposition:  Return in about 6 months (around 2/10/2019) for bp, chol, ifg.       Reviewed plan of care. Patient has provided input and agrees with goals. The nurse provided the patient and/or family with advanced directive information if needed and encouraged the patient to provide a copy to the office when available. This is the Subsequent Medicare Annual Wellness Exam, performed 12 months or more after the Initial AWV or the last Subsequent AWV    I have reviewed the patient's medical history in detail and updated the computerized patient record.      History     Past Medical History:   Diagnosis Date    CAD (coronary artery disease)     CKD (chronic kidney disease), stage III 9/26/2014    Followed by Dr. 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 Will Farr HX TONSILLECTOMY       Current Outpatient Prescriptions   Medication Sig Dispense Refill    nicotinic acid (NIACIN) 500 mg tablet Take 500 mg by mouth Daily (before breakfast).  amLODIPine (NORVASC) 5 mg tablet Take 1 Tab by mouth daily. 90 Tab 3    clopidogrel (PLAVIX) 75 mg tab Take 1 Tab by mouth daily. 90 Tab 3    carvedilol (COREG) 6.25 mg tablet Take 1 Tab by mouth two (2) times a day. 180 Tab 3    famotidine (PEPCID) 20 mg tablet Take 1 Tab by mouth two (2) times a day. 180 Tab 3    colesevelam (WELCHOL) 625 mg tablet TAKE TWO TABLETS BY MOUTH TWICE DAILY WITH FOOD 72 Tab 0    rosuvastatin (CRESTOR) 20 mg tablet Take 1 Tab by mouth nightly. 90 Tab 3    losartan (COZAAR) 25 mg tablet Take 0.5 Tabs by mouth daily. 45 Tab 3    cholecalciferol, vitamin D3, (VITAMIN D3) 2,000 unit tab Take 1 Tab by mouth daily.  aspirin 81 mg tablet Take 81 mg by mouth daily.       triamcinolone acetonide (KENALOG) 0.1 % ointment Apply to affected area daily as needed 30 g 2     Allergies   Allergen Reactions    Lisinopril Other (comments)     High K     Family History   Problem Relation Age of Onset    Diabetes Maternal Grandmother     Diabetes Paternal Grandfather     Hypertension Mother      Social History   Substance Use Topics    Smoking status: Never Smoker    Smokeless tobacco: Never Used    Alcohol use No     Patient Active Problem List   Diagnosis Code    HTN (hypertension) I10    Allergic rhinitis J30.9    CAD (coronary artery disease) I25.10    PAC (premature atrial contraction) I49.1    Esophageal reflux K21.9    Other and unspecified hyperlipidemia E78.5    GERD (gastroesophageal reflux disease) K21.9    PVC (premature ventricular contraction) I49.3    Carotid artery stenosis I65.29    Elevated serum creatinine R79.89    IFG (impaired fasting glucose) R73.01    CKD (chronic kidney disease), stage III N18.3    Heart palpitations R00.2    Hyperlipidemia E78.5    Coronary artery disease involving native coronary artery of native heart without angina pectoris I25.10       Depression Risk Factor Screening:     PHQ over the last two weeks 8/10/2018   Little interest or pleasure in doing things Not at all   Feeling down, depressed, irritable, or hopeless Not at all   Total Score PHQ 2 0     Alcohol Risk Factor Screening: You do not drink alcohol or very rarely. Functional Ability and Level of Safety:   Hearing Loss  Hearing is good. Activities of Daily Living  The home contains: no safety equipment. Patient does total self care    Fall Risk  Fall Risk Assessment, last 12 mths 8/10/2018   Able to walk? Yes   Fall in past 12 months? No   Fall with injury? -   Number of falls in past 12 months -   Fall Risk Score -       Abuse Screen  Patient is not abused    Cognitive Screening   Evaluation of Cognitive Function:  Has your family/caregiver stated any concerns about your memory: no  Normal    Patient Care Team   Patient Care Team:  Hazel Flores MD as PCP - Trinh Roberto MD (Cardiology)    Assessment/Plan   Education and counseling provided:  Are appropriate based on today's review and evaluation    Diagnoses and all orders for this visit:    1. Medicare annual wellness visit, subsequent    2. Hyperlipidemia, unspecified hyperlipidemia type  -     METABOLIC PANEL, COMPREHENSIVE    3. CKD (chronic kidney disease), stage III    4. IFG (impaired fasting glucose)  -     HEMOGLOBIN A1C WITH EAG    5.  Screening for alcoholism  -     Annual  Alcohol Screen 15 min ()    6. Screening for depression  -     Depression Screen Annual    7. Essential hypertension    8. Gastroesophageal reflux disease, esophagitis presence not specified    9. Advanced care planning/counseling discussion    10.  Fatigue, unspecified type  -     TSH RFX ON ABNORMAL TO FREE T4  -     CBC WITH AUTOMATED DIFF        Health Maintenance Due   Topic Date Due    Pneumococcal 65+ Low/Medium Risk (2 of 2 - PPSV23) 06/01/2018

## 2018-08-10 NOTE — MR AVS SNAPSHOT
[address] [address] [phone] Patient: Jaylen Patino MRN: [id number] :1940 Visit Information Date & Time Provider Department Dept. Phone Encounter #  
 8/10/2018 10:30 AM Carolynn Mays MD [facility] Internal [facility] [address] [facility] [id number] Follow-up Instructions Return in about 6 months (around 2/10/2019) for bp, chol, ifg.  
  
Your Appointments 10/19/2018 10:00 AM  
ESTABLISHED PATIENT with [address] Ayesha Parra MD  
[facility] Appt Note: 6 mo f/u per Dr. Gera Serrato [address]  [address] [place] [address]  
[address] [phone] Upcoming Health Maintenance Date Due Pneumococcal 65+ Low/Medium Risk (2 of 2 - PPSV23) 2018 MEDICARE YEARLY EXAM 2018 Influenza Age 5 to Adult 2018* GLAUCOMA SCREENING Q2Y 2019 DTaP/Tdap/Td series (2 - Td) 2025 COLONOSCOPY 2025 *Topic was postponed. The date shown is not the original due date. Allergies as of 8/10/2018  Review Complete On: 8/10/2018 By: Carolynn Mays MD  
  
 Severity Noted Reaction Type Reactions Lisinopril  2015    Other (comments) High K Current Immunizations  Reviewed on 8/10/2018 Name Date Tdap 2015 11:00 AM  
 ZZZ-RETIRED (DO NOT USE) Pneumococcal Vaccine (Unspecified Type)  Deferred (Patient Refused) Reviewed by Carolynn Mays MD on 8/10/2018 at 11:19 AM  
You Were Diagnosed With   
  
 Codes Comments Medicare annual wellness visit, subsequent    -  Primary ICD-10-CM: Z00.00 ICD-9-CM: V70.0 Hyperlipidemia, unspecified hyperlipidemia type     ICD-10-CM: E78.5 ICD-9-CM: 272.4 CKD (chronic kidney disease), stage III     ICD-10-CM: N18.3 ICD-9-CM: 585.3  IFG (impaired fasting glucose)     ICD-10-CM: R73.01 
 ICD-9-CM: 790.21 Screening for alcoholism     ICD-10-CM: Z13.89 ICD-9-CM: V79.1 Screening for depression     ICD-10-CM: Z13.89 ICD-9-CM: V79.0 Essential hypertension     ICD-10-CM: I10 
ICD-9-CM: 401.9 Gastroesophageal reflux disease, esophagitis presence not specified     ICD-10-CM: K21.9 ICD-9-CM: 530.81 Advanced care planning/counseling discussion     ICD-10-CM: Z71.89 ICD-9-CM: V65.49 Fatigue, unspecified type     ICD-10-CM: R53.83 ICD-9-CM: 780.79 Vitals BP Pulse Temp Resp Height(growth percentile) Weight(growth percentile) 144/74 65 97.6 °F (36.4 °C) (Oral) 16 5' 3\" (1.6 m) 121 lb 3.2 oz (55 kg) SpO2 BMI OB Status Smoking Status 98% 21.47 kg/m2 Postmenopausal Never Smoker Vitals History BMI and BSA Data Body Mass Index Body Surface Area  
 21.47 kg/m 2 1.56 m 2 Preferred Pharmacy Pharmacy Name Phone [facility] [address] [phone] Your Updated Medication List  
  
   
This list is accurate as of 8/10/18 11:26 AM.  Always use your most recent med list. amLODIPine 5 mg tablet Commonly known as:  Jordan Yohannes Take 1 Tab by mouth daily. aspirin 81 mg tablet Take 81 mg by mouth daily. carvedilol 6.25 mg tablet Commonly known as:  Haskel Scarce Take 1 Tab by mouth two (2) times a day. clopidogrel 75 mg Tab Commonly known as:  PLAVIX Take 1 Tab by mouth daily. colesevelam 625 mg tablet Commonly known as:  WELCHOL  
TAKE TWO TABLETS BY MOUTH TWICE DAILY WITH FOOD  
  
 famotidine 20 mg tablet Commonly known as:  PEPCID Take 1 Tab by mouth two (2) times a day. losartan 25 mg tablet Commonly known as:  COZAAR Take 0.5 Tabs by mouth daily. nicotinic acid 500 mg tablet Commonly known as:  NIACIN Take 500 mg by mouth Daily (before breakfast). rosuvastatin 20 mg tablet Commonly known as:  CRESTOR  
 Take 1 Tab by mouth nightly. triamcinolone acetonide 0.1 % ointment Commonly known as:  KENALOG Apply to affected area daily as needed VITAMIN D3 2,000 unit Tab Generic drug:  cholecalciferol (vitamin D3) Take 1 Tab by mouth daily. We Performed the Following CBC WITH AUTOMATED DIFF [58770 CPT(R)] [address] [[id number] [facility]] HEMOGLOBIN A1C WITH EAG [85515 CPT(R)] METABOLIC PANEL, COMPREHENSIVE [33512 CPT(R)] SC ANNUAL ALCOHOL SCREEN 15 MIN [id number] [facility]] TSH RFX ON ABNORMAL TO FREE T4 [[id number] Custom] Follow-up Instructions Return in about 6 months (around 2/10/2019) for bp, chol, ifg.  
  
  
Patient Instructions Medicare Wellness Visit, Female The best way to live healthy is to have a lifestyle where you eat a well-balanced diet, exercise regularly, limit alcohol use, and quit all forms of tobacco/nicotine, if applicable. Regular preventive services are another way to keep healthy. Preventive services (vaccines, screening tests, monitoring & exams) can help personalize your care plan, which helps you manage your own care. Screening tests can find health problems at the earliest stages, when they are easiest to treat. [facility] follows the current, evidence-based guidelines published by the [facility] Za[facility] Yoel ([facility]) when recommending preventive services for our patients. Because we follow these guidelines, sometimes recommendations change over time as research supports it. (For example, mammograms used to be recommended annually. Even though Medicare will still pay for an annual mammogram, the newer guidelines recommend a mammogram every two years for women of average risk.) Of course, you and your provider may decide to screen more often for some diseases, based on your risk and co-morbidities (chronic disease you are already diagnosed with). Preventive services for you include: - Medicare offers their members a free annual wellness visit, which is time for you and your primary care provider to discuss and plan for your preventive service needs. Take advantage of this benefit every year! 
 
-All people over age 72 should receive the recommended pneumonia vaccines. Current USPSTF guidelines recommend a series of two vaccines for the best pneumonia protection.  
 
-All adults should have a yearly flu vaccine and a tetanus vaccine every 10 years. All adults age 61 years should receive a shingles vaccine once in their lifetime.   
 
-A bone mass density test is recommended when a woman turns 65 to screen for osteoporosis. This test is only recommended once as a screening. Some providers will use this same test as a disease monitoring tool if you already have osteoporosis. -All adults age 38-68 years who are overweight should have a diabetes screening test once every three years.  
 
-Other screening tests & preventive services for persons with diabetes include: an eye exam to screen for diabetic retinopathy, a kidney function test, a foot exam, and stricter control over your cholesterol.  
 
-Cardiovascular screening for adults with routine risk involves an electrocardiogram (ECG) at intervals determined by the provider.  
 
-Colorectal cancer screenings should be done for adults age 54-65 years with normal risk. There are a number of acceptable methods of screening for this type of cancer. Each test has its own benefits and drawbacks. Discuss with your provider what is most appropriate for you during your annual wellness visit. The different tests include: colonoscopy (considered the best screening method), a fecal occult blood test, a fecal DNA test, and sigmoidoscopy.  
 
-Breast cancer screenings are recommended every other year for women of normal risk age 54-69 years.  
 
-Cervical cancer screenings for women over age 72 are only recommended with certain risk factors.  
 
-All adults born between [facility] should be screened once for Hepatitis C. Here is a list of your current Health Maintenance items (your personalized list of preventive services) with a due date: 
Health Maintenance Due Topic Date Due  Pneumococcal Vaccine (2 of 2 - PPSV23) 06/01/2018 [employer] Annual Well Visit  06/02/2018 Introducing [facility]! Amna Fleming introduces [employer] patient portal. Now you can access parts of your medical record, email your doctor's office, and request medication refills online. 1. In your internet browser, go to [email or web address] 2. Click on the First Time User? Click Here link in the Sign In box. You will see the New Member Sign Up page. 3. Enter your [employer] Access Code exactly as it appears below. You will not need to use this code after youve completed the sign-up process. If you do not sign up before the expiration date, you must request a new code. · [employer] Access Code: [email or web address] Expires: 11/8/2018 11:26 AM 
 
4. Enter the last four digits of your Social Security Number (xxxx) and Date of Birth (mm/dd/yyyy) as indicated and click Submit. You will be taken to the next sign-up page. 5. Create a [employer] ID. This will be your [employer] login ID and cannot be changed, so think of one that is secure and easy to remember. 6. Create a [employer] password. You can change your password at any time. 7. Enter your Password Reset Question and Answer. This can be used at a later time if you forget your password. 8. Enter your e-mail address. You will receive e-mail notification when new information is available in [address] 9. Click Sign Up. You can now view and download portions of your medical record. 10. Click the Download Summary menu link to download a portable copy of your medical information.  
 
If you have questions, please visit the Frequently Asked Questions section of the [employer]. Remember, [employer] is NOT to be used for urgent needs. For medical emergencies, dial 911. Now available from your iPhone and Android! Please provide this summary of care documentation to your next provider. Your primary care clinician is listed as Jason Sinha. If you have any questions after today's visit, please call [phone].

## 2018-08-10 NOTE — ACP (ADVANCE CARE PLANNING)
Advance Care Planning (ACP) Provider Conversation Snapshot    Date of ACP Conversation: 08/10/18  Persons included in Conversation:  patient  Length of ACP Conversation in minutes:  <16 minutes (Non-Billable)    Authorized Decision Maker (if patient is incapable of making informed decisions): This person is:   one of her children - can't decide - not her  with memory issues now          For Patients with Decision Making Capacity:   Values/Goals: Exploration of values, goals, and preferences if recovery is not expected, even with continued medical treatment in the event of:  Imminent death  Severe, permanent brain injury  \"In these circumstances, what matters most to you? \"  Care focused more on comfort or quality of life.     Conversation Outcomes / Follow-Up Plan:   Recommended completion of Advance Directive form after review of ACP materials and conversation with prospective healthcare agent

## 2018-08-10 NOTE — PATIENT INSTRUCTIONS
Medicare Wellness Visit, Female    The best way to live healthy is to have a lifestyle where you eat a well-balanced diet, exercise regularly, limit alcohol use, and quit all forms of tobacco/nicotine, if applicable. Regular preventive services are another way to keep healthy. Preventive services (vaccines, screening tests, monitoring & exams) can help personalize your care plan, which helps you manage your own care. Screening tests can find health problems at the earliest stages, when they are easiest to treat. [facility] follows the current, evidence-based guidelines published by the [facility] Zaheer Yoel ([facility]) when recommending preventive services for our patients. Because we follow these guidelines, sometimes recommendations change over time as research supports it. (For example, mammograms used to be recommended annually. Even though Medicare will still pay for an annual mammogram, the newer guidelines recommend a mammogram every two years for women of average risk.)    Of course, you and your provider may decide to screen more often for some diseases, based on your risk and co-morbidities (chronic disease you are already diagnosed with). Preventive services for you include:    - Medicare offers their members a free annual wellness visit, which is time for you and your primary care provider to discuss and plan for your preventive service needs. Take advantage of this benefit every year!    -All people over age 72 should receive the recommended pneumonia vaccines. Current USPSTF guidelines recommend a series of two vaccines for the best pneumonia protection.     -All adults should have a yearly flu vaccine and a tetanus vaccine every 10 years. All adults age 61 years should receive a shingles vaccine once in their lifetime.      -A bone mass density test is recommended when a woman turns 65 to screen for osteoporosis.  This test is only recommended once as a screening. Some providers will use this same test as a disease monitoring tool if you already have osteoporosis. -All adults age 38-68 years who are overweight should have a diabetes screening test once every three years.     -Other screening tests & preventive services for persons with diabetes include: an eye exam to screen for diabetic retinopathy, a kidney function test, a foot exam, and stricter control over your cholesterol.     -Cardiovascular screening for adults with routine risk involves an electrocardiogram (ECG) at intervals determined by the provider.     -Colorectal cancer screenings should be done for adults age 54-65 years with normal risk. There are a number of acceptable methods of screening for this type of cancer. Each test has its own benefits and drawbacks. Discuss with your provider what is most appropriate for you during your annual wellness visit. The different tests include: colonoscopy (considered the best screening method), a fecal occult blood test, a fecal DNA test, and sigmoidoscopy. -Breast cancer screenings are recommended every other year for women of normal risk age 54-69 years.     -Cervical cancer screenings for women over age 72 are only recommended with certain risk factors.     -All adults born between [facility] should be screened once for Hepatitis C.      Here is a list of your current Health Maintenance items (your personalized list of preventive services) with a due date:  Health Maintenance Due   Topic Date Due    Pneumococcal Vaccine (2 of 2 - PPSV23) 06/01/2018    Annual Well Visit  06/02/2018

## 2018-08-11 LAB
ALBUMIN SERPL-MCNC: 4.7 G/DL (ref 3.5–4.8)
ALBUMIN/GLOB SERPL: 2 {RATIO} (ref 1.2–2.2)
ALP SERPL-CCNC: 101 IU/L (ref 39–117)
ALT SERPL-CCNC: 11 IU/L (ref 0–32)
AST SERPL-CCNC: 22 IU/L (ref 0–40)
BASOPHILS # BLD AUTO: 0 X10E3/UL (ref 0–0.2)
BASOPHILS NFR BLD AUTO: 0 %
BILIRUB SERPL-MCNC: 0.6 MG/DL (ref 0–1.2)
BUN SERPL-MCNC: 17 MG/DL (ref 8–27)
BUN/CREAT SERPL: 19 (ref 12–28)
CALCIUM SERPL-MCNC: 9.7 MG/DL (ref 8.7–10.3)
CHLORIDE SERPL-SCNC: 103 MMOL/L (ref 96–106)
CO2 SERPL-SCNC: 22 MMOL/L (ref 20–29)
CREAT SERPL-MCNC: 0.91 MG/DL (ref 0.57–1)
EOSINOPHIL # BLD AUTO: 0.2 X10E3/UL (ref 0–0.4)
EOSINOPHIL NFR BLD AUTO: 3 %
ERYTHROCYTE [DISTWIDTH] IN BLOOD BY AUTOMATED COUNT: 13.3 % (ref 12.3–15.4)
EST. AVERAGE GLUCOSE BLD GHB EST-MCNC: 120 MG/DL
GLOBULIN SER CALC-MCNC: 2.3 G/DL (ref 1.5–4.5)
GLUCOSE SERPL-MCNC: 95 MG/DL (ref 65–99)
HBA1C MFR BLD: 5.8 % (ref 4.8–5.6)
HCT VFR BLD AUTO: 44.6 % (ref 34–46.6)
HGB BLD-MCNC: 14.3 G/DL (ref 11.1–15.9)
IMM GRANULOCYTES # BLD: 0 X10E3/UL (ref 0–0.1)
IMM GRANULOCYTES NFR BLD: 0 %
LYMPHOCYTES # BLD AUTO: 1.5 X10E3/UL (ref 0.7–3.1)
LYMPHOCYTES NFR BLD AUTO: 22 %
MCH RBC QN AUTO: 28.5 PG (ref 26.6–33)
MCHC RBC AUTO-ENTMCNC: 32.1 G/DL (ref 31.5–35.7)
MCV RBC AUTO: 89 FL (ref 79–97)
MONOCYTES # BLD AUTO: 0.4 X10E3/UL (ref 0.1–0.9)
MONOCYTES NFR BLD AUTO: 6 %
NEUTROPHILS # BLD AUTO: 4.7 X10E3/UL (ref 1.4–7)
NEUTROPHILS NFR BLD AUTO: 69 %
PLATELET # BLD AUTO: 221 X10E3/UL (ref 150–379)
POTASSIUM SERPL-SCNC: 5.3 MMOL/L (ref 3.5–5.2)
PROT SERPL-MCNC: 7 G/DL (ref 6–8.5)
RBC # BLD AUTO: 5.01 X10E6/UL (ref 3.77–5.28)
SODIUM SERPL-SCNC: 143 MMOL/L (ref 134–144)
TSH SERPL DL<=0.005 MIU/L-ACNC: 1.67 UIU/ML (ref 0.45–4.5)
WBC # BLD AUTO: 6.8 X10E3/UL (ref 3.4–10.8)

## 2018-08-23 RX ORDER — LOSARTAN POTASSIUM 25 MG/1
TABLET ORAL
Qty: 45 TAB | Refills: 3 | Status: SHIPPED | OUTPATIENT
Start: 2018-08-23 | End: 2019-03-27 | Stop reason: DRUGHIGH

## 2018-08-23 NOTE — TELEPHONE ENCOUNTER
Requested Prescriptions     Signed Prescriptions Disp Refills    losartan (COZAAR) 25 mg tablet 45 Tab 3     Sig: TAKE ONE-HALF TABLET BY MOUTH DAILY     Authorizing Provider: Alyssa Prabhakar     Ordering User: Brian Torres     Per verbal orders

## 2018-10-02 RX ORDER — ROSUVASTATIN CALCIUM 20 MG/1
20 TABLET, COATED ORAL
Qty: 90 TAB | Refills: 3 | Status: SHIPPED | OUTPATIENT
Start: 2018-10-02 | End: 2018-11-15 | Stop reason: SDUPTHER

## 2018-11-15 ENCOUNTER — OFFICE VISIT (OUTPATIENT)
Dept: CARDIOLOGY CLINIC | Age: 78
End: 2018-11-15

## 2018-11-15 VITALS
WEIGHT: 120.8 LBS | DIASTOLIC BLOOD PRESSURE: 80 MMHG | BODY MASS INDEX: 21.4 KG/M2 | SYSTOLIC BLOOD PRESSURE: 180 MMHG | HEART RATE: 64 BPM | RESPIRATION RATE: 16 BRPM | HEIGHT: 63 IN

## 2018-11-15 DIAGNOSIS — I49.3 PVC (PREMATURE VENTRICULAR CONTRACTION): ICD-10-CM

## 2018-11-15 DIAGNOSIS — R73.01 IFG (IMPAIRED FASTING GLUCOSE): ICD-10-CM

## 2018-11-15 DIAGNOSIS — N18.30 CKD (CHRONIC KIDNEY DISEASE), STAGE III (HCC): ICD-10-CM

## 2018-11-15 DIAGNOSIS — I10 ESSENTIAL HYPERTENSION: Primary | ICD-10-CM

## 2018-11-15 DIAGNOSIS — E78.5 HYPERLIPIDEMIA, UNSPECIFIED HYPERLIPIDEMIA TYPE: ICD-10-CM

## 2018-11-15 DIAGNOSIS — R00.2 HEART PALPITATIONS: ICD-10-CM

## 2018-11-15 DIAGNOSIS — I25.10 CORONARY ARTERY DISEASE INVOLVING NATIVE CORONARY ARTERY OF NATIVE HEART WITHOUT ANGINA PECTORIS: ICD-10-CM

## 2018-11-15 DIAGNOSIS — I65.29 STENOSIS OF CAROTID ARTERY, UNSPECIFIED LATERALITY: ICD-10-CM

## 2018-11-15 DIAGNOSIS — I49.1 PAC (PREMATURE ATRIAL CONTRACTION): ICD-10-CM

## 2018-11-15 RX ORDER — CARVEDILOL 6.25 MG/1
6.25 TABLET ORAL 2 TIMES DAILY
Qty: 180 TAB | Refills: 3 | Status: SHIPPED | OUTPATIENT
Start: 2018-11-15 | End: 2019-04-01 | Stop reason: SDUPTHER

## 2018-11-15 RX ORDER — COLESEVELAM 180 1/1
TABLET ORAL
Qty: 360 TAB | Refills: 3 | Status: SHIPPED | OUTPATIENT
Start: 2018-11-15 | End: 2019-01-04 | Stop reason: SDUPTHER

## 2018-11-15 RX ORDER — ROSUVASTATIN CALCIUM 20 MG/1
20 TABLET, COATED ORAL
Qty: 90 TAB | Refills: 3 | Status: SHIPPED | OUTPATIENT
Start: 2018-11-15 | End: 2019-08-09 | Stop reason: SDUPTHER

## 2018-11-15 RX ORDER — CLOPIDOGREL BISULFATE 75 MG/1
75 TABLET ORAL DAILY
Qty: 90 TAB | Refills: 3 | Status: SHIPPED | OUTPATIENT
Start: 2018-11-15 | End: 2019-04-01 | Stop reason: SDUPTHER

## 2018-11-15 RX ORDER — AMLODIPINE BESYLATE 5 MG/1
5 TABLET ORAL DAILY
Qty: 90 TAB | Refills: 3 | Status: SHIPPED | OUTPATIENT
Start: 2018-11-15 | End: 2019-01-23

## 2018-11-15 NOTE — PROGRESS NOTES
[facility]: Lucretia Hartley  [phone]    HPI: Candace Zhou, a 65y.o. year-old who presents for follow up regarding her CAD. BP here today is way up. She is stressed over her  and his memory. Bp is doing well at home, 140/75. So doing well at home. Bp looks great today but mostly 130s at home, not eating well the last few days due to the sniffles. Walks but not as much in the weather. Continues to walk regularly, leg is better now. Leg cramps are much better now. She continues to walk regularly without symptoms and feels well with it. Walks 30 min a day 5-6 days a week. No blood in the stool or in the urine. No more chest tightness bother ing her at this time. Old HDL labs:    HDL 75 Tg 78  High ApoB, LDLp SdLDL lpa >225!!!!!  Nl Lpla2, bnp 194, Normal aspirin works  Insulin normal, glucose 109, Homocysteine 19  Vit D 32  LFT/CMp normal    Assessment/Plan:  1. HTN-at goal today on losartan, coreg, amlodipine. 2. CAD- stable, s/p TRANG RCA and midLAD 3/11   -Cath 8/12 with proximal RCA stent,  No chest pain now  -lifelong plavix and aspirin(her decision after discussion of risks and benefits)  3. Palpitations- better on coreg, still v-bigemeny at last holter  4. Hyperlipidemia- crestor 20, welchol, trilipix. She declines crestor 40.   - now declines titration of meds, declines PCSK9 also  -no Zetia due to cost.   5. Anxiety- better since she stopped checking her bp. 6. Reflux- on Nexium, and stable  7. Vit D deficiency 2000u/day, continue, at goal now    8. Impaired glucose tolerance- a1C  Still good  9. CKD Stage 3-stable now, improved, seen by Dr. Alexa Neil. -hyperkalemia in the past improved with stopping spironolactone, 4.8 now, also cut losartan for same  10.  Mild to moderate mitral regurgitation- by TTE 11/15    Stress nuc 6/16 wnl  Echo 11/15 EF 60%, mild TR, PAP 25, mild-mod MR  Carotid US: Mod LICA plaque <30%, no evidence of Subclavian Steal.  Stress nuc: 1/14 normal, EF 69%  Echo 9/12 EF 60, mod MR, mild TR  Cath 2012 RCA stent  Fhx no early cad  Soc no tob, no etoh    She  has a past medical history of CAD (coronary artery disease), CKD (chronic kidney disease), stage III ([address] HTN (hypertension), Hyperlipidemia, and Hypertension. Cardiovascular ROS: positive for - chest pain  Respiratory ROS: negative for - cough, hemoptysis, pleuritic pain or shortness of breath  Neurological ROS: no TIA or stroke symptoms  All other systems negative except as above. PE  Vitals:    11/15/18 1029   BP: 180/80   Pulse: 64   Resp: 16   Weight: 120 lb 12.8 oz (54.8 kg)   Height: 5' 3\" (1.6 m)      Body mass index is 21.4 kg/m².    General appearance - alert, well appearing, and in no distress  Mental status - affect appropriate to mood  Eyes - sclera anicteric, moist mucous membranes  Neck - supple, no significant adenopathy  Lymphatics - no  lymphadenopathy  Chest - clear to auscultation, no wheezes, rales or rhonchi  Heart - normal rate, regular rhythm, normal S1, S2, no murmurs, rubs, clicks or gallops  Abdomen - soft, nontender, nondistended, no masses or organomegaly  Back exam - full range of motion, no tenderness  Neurological - cranial nerves II through XII grossly intact, no focal deficit  Musculoskeletal - no muscular tenderness noted, normal strength  Extremities - peripheral pulses normal, no pedal edema  Skin - normal coloration  no rashes    Recent Labs:  Lab Results   Component Value Date/Time    Cholesterol, total 202 (H) 04/13/2018 09:24 AM    HDL Cholesterol 81 04/13/2018 09:24 AM    LDL, calculated 109 (H) 04/13/2018 09:24 AM    Triglyceride 61 04/13/2018 09:24 AM    CHOL/HDL Ratio 3.1 09/28/2011 05:00 AM       Lab Results   Component Value Date/Time    Creatinine 0.91 08/10/2018 12:12 PM       Lab Results   Component Value Date/Time    BUN 17 08/10/2018 12:12 PM       Lab Results   Component Value Date/Time    Potassium 5.3 (H) 08/10/2018 12:12 PM       Lab Results   Component Value Date/Time    Hemoglobin A1c 5.8 (H) 08/10/2018 12:12 PM       Lab Results   Component Value Date/Time    HGB 14.3 08/10/2018 12:12 PM       Lab Results   Component Value Date/Time    PLATELET 538 59/48/5505 12:12 PM         Reviewed:  Past Medical History:   Diagnosis Date    CAD (coronary artery disease)     CKD (chronic kidney disease), stage III ([address] 9/26/2014    Followed by Dr. 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  losartan (COZAAR) 25 mg tablet TAKE ONE-HALF TABLET BY MOUTH DAILY    nicotinic acid (NIACIN) 500 mg tablet Take 500 mg by mouth Daily (before breakfast).  amLODIPine (NORVASC) 5 mg tablet Take 1 Tab by mouth daily.  clopidogrel (PLAVIX) 75 mg tab Take 1 Tab by mouth daily.  carvedilol (COREG) 6.25 mg tablet Take 1 Tab by mouth two (2) times a day.  famotidine (PEPCID) 20 mg tablet Take 1 Tab by mouth two (2) times a day.  colesevelam (WELCHOL) 625 mg tablet TAKE TWO TABLETS BY MOUTH TWICE DAILY WITH FOOD    cholecalciferol, vitamin D3, (VITAMIN D3) 2,000 unit tab Take 1 Tab by mouth daily.  aspirin 81 mg tablet Take 81 mg by mouth daily.  triamcinolone acetonide (KENALOG) 0.1 % ointment Apply to affected area daily as needed     No current facility-administered medications for this visit.           Robert Parks MD  [employer] [facility] [place]  [address] [facility], [address] [place] [address] Crossing: [facility]  ([id number]) [facility]    HPI: Romero Leigh, a 65y.o. year-old who presents for follow up regarding her CAD. Bp looks great today but mostly 130s at home, not eating well the last few days due to the sniffles. Walks but not as much in the weather. Continues to walk regularly, leg is better now. She thinks she is having trouble with her memory. Leg cramps are much better now. .     She continues to walk regularly without symptoms and feels well with it. Walks 30 min a day 5-6 days a week. No blood in the stool or in the urine. She is feeling tightness in the chest after working outside, walking long time, goes up into the chest. Lasts a while and goes away with rest. Given these new sx will get lexiscan nuc to evalaute for cad progression, She can not walk on the treadmill. Old HDL labs:    HDL 75 Tg 78  High ApoB, LDLp SdLDL lpa >225!!!!!  Nl Lpla2, bnp 194, Normal aspirin works  Insulin normal, glucose 109, Homocysteine 19  Vit D 32  LFT/CMp normal    Assessment/Plan:  1. HTN-at goal today on losartan, coreg, amlodipine. 2. CAD- stable, s/p TRANG RCA and midLAD 3/11   -Cath 8/12 with proximal RCA stent,  No chest pain now  -lifelong plavix and aspirin(her decision after discussion of risks and benefits)  3. Palpitations- better on coreg, still v-bigemeny at last holter  4. Hyperlipidemia- crestor 40, welchol, trilipix. - now   -no Zetia due to cost.   5. Anxiety- better since she stopped checking her bp. 6. Reflux- on Nexium, and stable  7. Vit D deficiency 2000u/day, continue, at goal now    8. Impaired glucose tolerance- a1C  Still good  9. CKD Stage 3-stable now, improved, seen by Dr. Osvaldo Hanson. -hyperkalemia in the past improved with stopping spironolactone, 4.8 now, also cut losartan for same  10.  Mild to moderate mitral regurgitation- by TTE 11/15    Stress nuc 6/16 wnl  Echo 11/15 EF 60%, mild TR, PAP 25, mild-mod MR  Carotid US: Mod LICA plaque <35%, no evidence of Subclavian Steal.  Stress nuc: 1/14 normal, EF 69%  Echo 9/12 EF 61, mod MR, mild TR  Cath 2012 RCA stent  Fhx no early cad  Soc no tob, no etoh    She  has a past medical history of CAD (coronary artery disease), CKD (chronic kidney disease), stage III ([address] HTN (hypertension), Hyperlipidemia, and Hypertension. Cardiovascular ROS: positive for - chest pain  Respiratory ROS: negative for - cough, hemoptysis, pleuritic pain or shortness of breath  Neurological ROS: no TIA or stroke symptoms  All other systems negative except as above. PE  Vitals:    11/15/18 1029   BP: 180/80   Pulse: 64   Resp: 16   Weight: 120 lb 12.8 oz (54.8 kg)   Height: 5' 3\" (1.6 m)      Body mass index is 21.4 kg/m².    General appearance - alert, well appearing, and in no distress  Mental status - affect appropriate to mood  Eyes - sclera anicteric, moist mucous membranes  Neck - supple, no significant adenopathy  Lymphatics - no  lymphadenopathy  Chest - clear to auscultation, no wheezes, rales or rhonchi  Heart - normal rate, regular rhythm, normal S1, S2, no murmurs, rubs, clicks or gallops  Abdomen - soft, nontender, nondistended, no masses or organomegaly  Back exam - full range of motion, no tenderness  Neurological - cranial nerves II through XII grossly intact, no focal deficit  Musculoskeletal - no muscular tenderness noted, normal strength  Extremities - peripheral pulses normal, no pedal edema  Skin - normal coloration  no rashes    Recent Labs:  Lab Results   Component Value Date/Time    Cholesterol, total 202 (H) 04/13/2018 09:24 AM    HDL Cholesterol 81 04/13/2018 09:24 AM    LDL, calculated 109 (H) 04/13/2018 09:24 AM    Triglyceride 61 04/13/2018 09:24 AM    CHOL/HDL Ratio 3.1 09/28/2011 05:00 AM       Lab Results   Component Value Date/Time    Creatinine 0.91 08/10/2018 12:12 PM       Lab Results   Component Value Date/Time    BUN 17 08/10/2018 12:12 PM       Lab Results   Component Value Date/Time    Potassium 5.3 (H) 08/10/2018 12:12 PM       Lab Results   Component Value Date/Time    Hemoglobin A1c 5.8 (H) 08/10/2018 12:12 PM       Lab Results   Component Value Date/Time    HGB 14.3 08/10/2018 12:12 PM       Lab Results   Component Value Date/Time    PLATELET 956 75/72/4419 12:12 PM         Reviewed:  Past Medical History:   Diagnosis Date    CAD (coronary artery disease)     CKD (chronic kidney disease), stage III ([address] 9/26/2014    Followed by Dr. 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  losartan (COZAAR) 25 mg tablet TAKE ONE-HALF TABLET BY MOUTH DAILY    nicotinic acid (NIACIN) 500 mg tablet Take 500 mg by mouth Daily (before breakfast).  amLODIPine (NORVASC) 5 mg tablet Take 1 Tab by mouth daily.  clopidogrel (PLAVIX) 75 mg tab Take 1 Tab by mouth daily.  carvedilol (COREG) 6.25 mg tablet Take 1 Tab by mouth two (2) times a day.  famotidine (PEPCID) 20 mg tablet Take 1 Tab by mouth two (2) times a day.  colesevelam (WELCHOL) 625 mg tablet TAKE TWO TABLETS BY MOUTH TWICE DAILY WITH FOOD    cholecalciferol, vitamin D3, (VITAMIN D3) 2,000 unit tab Take 1 Tab by mouth daily.  aspirin 81 mg tablet Take 81 mg by mouth daily.  triamcinolone acetonide (KENALOG) 0.1 % ointment Apply to affected area daily as needed     No current facility-administered medications for this visit.           Walt Perera MD  [employer] [facility] [place]  [address] [facility] [address]  [address]

## 2019-01-04 ENCOUNTER — TELEPHONE (OUTPATIENT)
Dept: CARDIOLOGY CLINIC | Age: 79
End: 2019-01-04

## 2019-01-04 DIAGNOSIS — I25.10 CORONARY ARTERY DISEASE INVOLVING NATIVE CORONARY ARTERY OF NATIVE HEART WITHOUT ANGINA PECTORIS: ICD-10-CM

## 2019-01-04 RX ORDER — EZETIMIBE 10 MG/1
TABLET ORAL
COMMUNITY
End: 2019-01-04 | Stop reason: SDUPTHER

## 2019-01-04 RX ORDER — EZETIMIBE 10 MG/1
10 TABLET ORAL DAILY
Qty: 30 TAB | Refills: 3 | Status: SHIPPED | OUTPATIENT
Start: 2019-01-04 | End: 2019-05-08 | Stop reason: SDUPTHER

## 2019-01-04 RX ORDER — COLESEVELAM 180 1/1
TABLET ORAL
Qty: 360 TAB | Refills: 3 | Status: SHIPPED | OUTPATIENT
Start: 2019-01-04 | End: 2019-01-04 | Stop reason: ALTCHOICE

## 2019-01-04 NOTE — TELEPHONE ENCOUNTER
Per Dr. Marychuy Hernandez may d/c Welchol and substitute with Zetia 10 mg 1 tab daily. Requested Prescriptions     Signed Prescriptions Disp Refills    ezetimibe (ZETIA) 10 mg tablet 30 Tab 3     Sig: Take 1 Tab by mouth daily. Authorizing Provider: Jolene Caceres     Ordering User: Bailey Flores     Per verbal orders    Patient aware. Patient will let me know if not affordable.

## 2019-01-04 NOTE — TELEPHONE ENCOUNTER
Returned patient's call, 2 pt identifiers used    Patient's cost for Welchol with her new insurance is to costly for her and she is requesting an alternative. In the past Zetia was costly can we retry for Zetia or another alternative. Please advise.

## 2019-01-04 NOTE — TELEPHONE ENCOUNTER
Pt called to discuss the medication Welchol. She stated the medication cost too much for her and she would like to get the generic brand or a different medication within her budget.   Phone #[phone]  Thanks

## 2019-01-23 ENCOUNTER — OFFICE VISIT (OUTPATIENT)
Dept: INTERNAL MEDICINE CLINIC | Facility: CLINIC | Age: 79
End: 2019-01-23

## 2019-01-23 VITALS
WEIGHT: 123 LBS | HEART RATE: 71 BPM | DIASTOLIC BLOOD PRESSURE: 72 MMHG | OXYGEN SATURATION: 98 % | BODY MASS INDEX: 21.79 KG/M2 | HEIGHT: 63 IN | RESPIRATION RATE: 16 BRPM | SYSTOLIC BLOOD PRESSURE: 181 MMHG | TEMPERATURE: 98 F

## 2019-01-23 DIAGNOSIS — I10 ESSENTIAL HYPERTENSION: Primary | ICD-10-CM

## 2019-01-23 RX ORDER — AMLODIPINE BESYLATE 5 MG/1
10 TABLET ORAL DAILY
Qty: 90 TAB | Refills: 3
Start: 2019-01-23 | End: 2019-01-29 | Stop reason: SDUPTHER

## 2019-01-23 NOTE — PROGRESS NOTES
[facility]  Identified pt with two pt identifiers(name and ). Chief Complaint Patient presents with  
 Other  
  high blood pressure 1. Have you been to the ER, urgent care clinic since your last visit? Hospitalized since your last visit? NO 
 
2. Have you seen or consulted any other health care providers outside of the [address] [place] [address] since your last visit? Include any pap smears or colon screening. Dr Alberto Trujillo Today's provider has been notified of reason for visit, vitals and flowsheets obtained on patients. Advanced directives on file. Reviewed record In preparation for visit, huddled with provider and have obtained necessary documentation Health Maintenance Due Topic  Shingrix Vaccine Age 50> (1 of 2)  Pneumococcal 65+ Low/Medium Risk (2 of 2 - PPSV23)  Influenza Age 5 to Adult  GLAUCOMA SCREENING Q2Y Wt Readings from Last 3 Encounters:  
19 123 lb (55.8 kg) 11/15/18 120 lb 12.8 oz (54.8 kg) 08/10/18 121 lb 3.2 oz (55 kg) Temp Readings from Last 3 Encounters:  
19 98 °F (36.7 °C) (Oral) 08/10/18 97.6 °F (36.4 °C) (Oral) 18 98.6 °F (37 °C) (Oral) BP Readings from Last 3 Encounters:  
19 181/72  
11/15/18 180/80  
08/10/18 144/74 Pulse Readings from Last 3 Encounters:  
19 71  
11/15/18 64  
08/10/18 65 Vitals:  
 19 0958 19 1001 BP: (!) 203/72 181/72 Pulse: 71 Resp: 16 Temp: 98 °F (36.7 °C) TempSrc: Oral   
SpO2: 98% Weight: 123 lb (55.8 kg) Height: 5' 3\" (1.6 m) PainSc:   5 PainLoc: Head Learning Assessment: 
:  
 
Learning Assessment 2014 PRIMARY LEARNER Patient HIGHEST LEVEL OF EDUCATION - PRIMARY LEARNER  DID NOT GRADUATE HIGH SCHOOL  
BARRIERS PRIMARY LEARNER NONE  
CO-LEARNER CAREGIVER No  
PRIMARY LANGUAGE ENGLISH  
LEARNER PREFERENCE PRIMARY DEMONSTRATION  
ANSWERED BY patient RELATIONSHIP SELF Depression Screening: 
:  
 
 PHQ over the last two weeks 1/23/2019 Little interest or pleasure in doing things Not at all Feeling down, depressed, irritable, or hopeless Not at all Total Score PHQ 2 0 Fall Risk Assessment: 
:  
 
Fall Risk Assessment, last 12 mths 8/10/2018 Able to walk? Yes Fall in past 12 months? No  
Fall with injury? -  
Number of falls in past 12 months - Fall Risk Score -  
 
 
Abuse Screening: 
:  
 
Abuse Screening Questionnaire 8/10/2018 3/6/2017 3/16/2016 6/13/2014 Do you ever feel afraid of your partner? N N N N Are you in a relationship with someone who physically or mentally threatens you? N N N N Is it safe for you to go home? Y Y Y Y  
 
 
ADL Screening: 
:  
 
ADL Assessment 3/6/2017 Feeding yourself No Help Needed Getting from bed to chair No Help Needed Getting dressed No Help Needed Bathing or showering No Help Needed Walk across the room (includes cane/walker) No Help Needed Using the telphone No Help Needed Taking your medications No Help Needed Preparing meals No Help Needed Managing money (expenses/bills) No Help Needed Moderately strenuous housework (laundry) No Help Needed Shopping for personal items (toiletries/medicines) No Help Needed Shopping for groceries No Help Needed Driving No Help Needed Climbing a flight of stairs No Help Needed Getting to places beyond walking distances No Help Needed Medication reconciliation up to date and corrected with patient at this time.

## 2019-01-23 NOTE — PATIENT INSTRUCTIONS
Patient Instructions 1. Increase amlodipine 5 mg from 1 tab to 2 tabs at night. 2. Dr. Dorothy Jensen will recheck your BP at your next clinic visit on 2/12/19. You will likely need a new prescription for amlodipine at that time. 3. Stay on a low-salt diet. 4. Continue to check your home BP regularly (3-4 times a week) and bring a written record with you to your next clinic visit 5. Call us at [phone] if you have any questions or concerns before your next clinic visit.

## 2019-01-23 NOTE — PROGRESS NOTES
CC:  
Chief Complaint Patient presents with  
 Other  
  high blood pressure HISTORY OF PRESENT ILLNESS Deborraren Underwood is a 77 y.o. female. Her PCP is Dr Ronne Dakin. Presents for evaluation of BP. She has HTN, CKD stage 3, CAD, hyperlipidemia, and GERD. Reports her BP has been high at home: 152/88 yesterday and 154/93 today. Her BP was 180/80 on 11/15/18 in [facility]. Having new pressure sensations at back of head. Has intermittent heartburn/lower sternal pressure; not new. Denies dyspnea, heart palpitations, or leg swelling. Admits to not staying on a strict low-salt diet. Reports compliance with all her medications, including amlodipine 5 mg daily, carvedilol 6.25 mg twice daily, and losartan 25 mg 1/2 tab once daily. On low dose of losartan due to mild hyperkalemia. Exercises regularly. Patient Active Problem List  
Diagnosis Code  
 HTN (hypertension) I10  
 Allergic rhinitis J30.9  CAD (coronary artery disease) I25.10  PAC (premature atrial contraction) I49.1  Esophageal reflux K21.9  Other and unspecified hyperlipidemia E78.5  GERD (gastroesophageal reflux disease) K21.9  PVC (premature ventricular contraction) I49.3  Carotid artery stenosis I65.29  
 Elevated serum creatinine R79.89  
 IFG (impaired fasting glucose) R73.01  
 CKD (chronic kidney disease), stage III (HCC) N18.3  Heart palpitations R00.2  Hyperlipidemia E78.5  Coronary artery disease involving native coronary artery of native heart without angina pectoris I25.10 Past Medical History:  
Diagnosis Date  CAD (coronary artery disease)  CKD (chronic kidney disease), stage III ([facility] [address] 9/26/2014 Followed by Dr. 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 30 Tab 3  
 amLODIPine (NORVASC) 5 mg tablet Take 1 Tab by mouth daily. 90 Tab 3  clopidogrel (PLAVIX) 75 mg tab Take 1 Tab by mouth daily. 90 Tab 3  carvedilol (COREG) 6.25 mg tablet Take 1 Tab by mouth two (2) times a day. 180 Tab 3  
 rosuvastatin (CRESTOR) 20 mg tablet Take 1 Tab by mouth nightly. 90 Tab 3  
 losartan (COZAAR) 25 mg tablet TAKE ONE-HALF TABLET BY MOUTH DAILY 45 Tab 3  
 nicotinic acid (NIACIN) 500 mg tablet Take 500 mg by mouth Daily (before breakfast).  famotidine (PEPCID) 20 mg tablet Take 1 Tab by mouth two (2) times a day. 180 Tab 3  cholecalciferol, vitamin D3, (VITAMIN D3) 2,000 unit tab Take 1 Tab by mouth daily.  aspirin 81 mg tablet Take 81 mg by mouth daily. PHYSICAL EXAM 
Visit Vitals /72 Pulse 71 Temp 98 °F (36.7 °C) (Oral) Resp 16 Ht 5' 3\" (1.6 m) Wt 123 lb (55.8 kg) SpO2 98% BMI 21.79 kg/m² Repeat BP with manual cuff 178/71. General: Pleasant, well-developed and well-nourished, no distress. HEENT:  Head normocephalic/atraumatic, no scleral icterus Lungs:  Clear to ausculation bilaterally. Good air movement. Heart:  Regular rate and rhythm, normal S1 and S2, no murmur, gallop, or rub Extremities: No clubbing, cyanosis, or edema. Neurological: Alert and oriented. Psychiatric: Normal mood and affect. Behavior is normal.  
 
 
 
ASSESSMENT AND PLAN 
  ICD-10-CM ICD-9-CM 1. Essential hypertension I10 401.9 Diagnoses and all orders for this visit: 1. Essential hypertension Patient Instructions 1. Increase amlodipine 5 mg from 1 tab to 2 tabs at night. 2. Dr. Iliana Munoz will recheck your BP at your next clinic visit on 2/12/19. You will likely need a new prescription for amlodipine at that time. 3. Stay on a low-salt diet.  
4. Continue to check your home BP regularly (3-4 times a week) and bring a written record with you to your next clinic visit 5. Call us at [phone] if you have any questions or concerns before your next clinic visit. Follow-up Disposition: 
Return if symptoms worsen or fail to improve, for Scheduled appointment with Dr. Jovany Wood on 2/12/19. I have discussed the diagnosis with the patient and the intended plan as seen in the above orders. Patient is in agreement. The patient has received an after-visit summary and questions were answered concerning future plans. I have discussed medication side effects and warnings with the patient as well.

## 2019-01-29 RX ORDER — AMLODIPINE BESYLATE 5 MG/1
10 TABLET ORAL DAILY
Qty: 180 TAB | Refills: 3 | Status: SHIPPED | OUTPATIENT
Start: 2019-01-29 | End: 2019-08-09 | Stop reason: SDUPTHER

## 2019-02-12 ENCOUNTER — OFFICE VISIT (OUTPATIENT)
Dept: INTERNAL MEDICINE CLINIC | Facility: CLINIC | Age: 79
End: 2019-02-12

## 2019-02-12 VITALS
SYSTOLIC BLOOD PRESSURE: 131 MMHG | RESPIRATION RATE: 18 BRPM | BODY MASS INDEX: 21.4 KG/M2 | HEIGHT: 63 IN | WEIGHT: 120.8 LBS | DIASTOLIC BLOOD PRESSURE: 71 MMHG | HEART RATE: 68 BPM | OXYGEN SATURATION: 98 % | TEMPERATURE: 97.8 F

## 2019-02-12 DIAGNOSIS — I10 ESSENTIAL HYPERTENSION: ICD-10-CM

## 2019-02-12 DIAGNOSIS — R73.01 IFG (IMPAIRED FASTING GLUCOSE): ICD-10-CM

## 2019-02-12 DIAGNOSIS — K21.9 GASTROESOPHAGEAL REFLUX DISEASE WITHOUT ESOPHAGITIS: ICD-10-CM

## 2019-02-12 DIAGNOSIS — E78.5 HYPERLIPIDEMIA, UNSPECIFIED HYPERLIPIDEMIA TYPE: Primary | ICD-10-CM

## 2019-02-12 DIAGNOSIS — K21.9 GASTROESOPHAGEAL REFLUX DISEASE, ESOPHAGITIS PRESENCE NOT SPECIFIED: ICD-10-CM

## 2019-02-12 DIAGNOSIS — I25.10 CORONARY ARTERY DISEASE INVOLVING NATIVE CORONARY ARTERY OF NATIVE HEART WITHOUT ANGINA PECTORIS: ICD-10-CM

## 2019-02-12 RX ORDER — FAMOTIDINE 20 MG/1
20 TABLET, FILM COATED ORAL 2 TIMES DAILY
Qty: 180 TAB | Refills: 3 | Status: SHIPPED | OUTPATIENT
Start: 2019-02-12 | End: 2019-08-09 | Stop reason: SDUPTHER

## 2019-02-12 NOTE — PROGRESS NOTES
Sage Aragon  Identified pt with two pt identifiers(name and ). Chief Complaint Patient presents with  Follow-up 2A; 6m  Blood Pressure Check  Cholesterol Problem  Blood sugar problem Reviewed record In preparation for visit and have obtained necessary documentation. Has info on advanced directive but has not filled them out. 1. Have you been to the ER, urgent care clinic or hospitalized since your last visit? No  
 
2. Have you seen or consulted any other health care providers outside of the [address] [place] [address] since your last visit? Include any pap smears or colon screening. No 
 
Vitals reviewed with provider. Health Maintenance reviewed:  
Eye exam with Dr Jason Esposito Health Maintenance Due Topic  Shingrix Vaccine Age 50> (1 of 2)  Pneumococcal 65+ Low/Medium Risk (2 of 2 - PPSV23)  GLAUCOMA SCREENING Q2Y Wt Readings from Last 3 Encounters:  
19 120 lb 12.8 oz (54.8 kg) 19 123 lb (55.8 kg) 11/15/18 120 lb 12.8 oz (54.8 kg) Temp Readings from Last 3 Encounters:  
19 97.8 °F (36.6 °C) (Oral) 19 98 °F (36.7 °C) (Oral) 08/10/18 97.6 °F (36.4 °C) (Oral) BP Readings from Last 3 Encounters:  
19 145/85  
19 181/72  
11/15/18 180/80 Pulse Readings from Last 3 Encounters:  
19 68  
19 71  
11/15/18 64 Vitals:  
 19 1395 BP: 145/85 Pulse: 68 Resp: 18 Temp: 97.8 °F (36.6 °C) TempSrc: Oral  
SpO2: 98% Weight: 120 lb 12.8 oz (54.8 kg) Height: 5' 3\" (1.6 m) PainSc:   0 - No pain Learning Assessment:  
:  
 
 
Learning Assessment 2014 PRIMARY LEARNER Patient HIGHEST LEVEL OF EDUCATION - PRIMARY LEARNER  DID NOT GRADUATE HIGH SCHOOL  
BARRIERS PRIMARY LEARNER NONE  
CO-LEARNER CAREGIVER No  
PRIMARY LANGUAGE ENGLISH  
LEARNER PREFERENCE PRIMARY DEMONSTRATION  
ANSWERED BY patient RELATIONSHIP SELF Depression Screening:  
:  
 
 
 3 most recent PHQ Screens 2/12/2019 Little interest or pleasure in doing things Not at all Feeling down, depressed, irritable, or hopeless Not at all Total Score PHQ 2 0 Fall Risk Assessment:  
:  
 
 
Fall Risk Assessment, last 12 mths 8/10/2018 Able to walk? Yes Fall in past 12 months? No  
Fall with injury? -  
Number of falls in past 12 months - Fall Risk Score -  
  
 
Abuse Screening:  
:  
 
 
Abuse Screening Questionnaire 8/10/2018 3/6/2017 3/16/2016 6/13/2014 Do you ever feel afraid of your partner? N N N N Are you in a relationship with someone who physically or mentally threatens you? N N N N Is it safe for you to go home? Y Y Y Y  
  
 
ADL Screening:  
:  
 
 

## 2019-02-12 NOTE — TELEPHONE ENCOUNTER
PCP: Emmanuelle Tran MD     Last appt: 2/12/2019     Future Appointments   Date Time Provider Malachi Millard   5/15/2019 10:20 AM Andrew Solares  [address] 8/14/2019  9:45 AM Emmanuelle Tran MD [facility]          Requested Prescriptions     Pending Prescriptions Disp Refills    famotidine (PEPCID) 20 mg tablet 180 Tab 3     Sig: Take 1 Tab by mouth two (2) times a day.

## 2019-02-12 NOTE — PROGRESS NOTES
HPI Ms. Bhupinder Breen is a 77y.o. year old female, she is seen today for follow up HTN, high cholesterol, IFG. A few weeks ago was seen by Dr. Ines Hendricks for high BP - amlodipine was increased to 10mg. Had chest tightness, throat tightness which has resolved since BP better controlled. Has noticed more frequent (but still not a lot) palpitations. No chest pain or sob, no HA or vision changes. Says has noticed ankles slightly swollen at end of day, better in am, since being on higher dose of amlodipine. BP at home has been 121-147/61-77 Says she will feel weak in her legs after spells of feeling \"woozy\" which are rare - will feel pressure around her head and eyes will feel tired. Has checked BP during this time and it was 523 systolic. Notes often will feel better after eating. Insurance no longer covers wellchol - now on zetia instead. Says her 's memory is worse, she has to do more. Her son is there on weekends and helps out. Chief Complaint Patient presents with  Follow-up 2A; 6m  Blood Pressure Check  Cholesterol Problem  Blood sugar problem Prior to Admission medications Medication Sig Start Date End Date Taking? Authorizing Provider  
amLODIPine (NORVASC) 5 mg tablet Take 2 Tabs by mouth daily. 1/29/19  Yes Pricila Botello MD  
ezetimibe (ZETIA) 10 mg tablet Take 1 Tab by mouth daily. 1/4/19  Yes Audrey Restrepo MD  
clopidogrel (PLAVIX) 75 mg tab Take 1 Tab by mouth daily. 11/15/18  Yes Audrey Restrepo MD  
carvedilol (COREG) 6.25 mg tablet Take 1 Tab by mouth two (2) times a day. 11/15/18  Yes Audrey Restrepo MD  
rosuvastatin (CRESTOR) 20 mg tablet Take 1 Tab by mouth nightly.  11/15/18  Yes Audrey Restrepo MD  
losartan (COZAAR) 25 mg tablet TAKE ONE-HALF TABLET BY MOUTH DAILY 8/23/18  Yes Gricel Dockery NP  
nicotinic acid (NIACIN) 500 mg tablet Take 500 mg by mouth Daily (before breakfast). Yes Provider, Historical  
famotidine (PEPCID) 20 mg tablet Take 1 Tab by mouth two (2) times a day. 2/1/18  Yes Mario Castelan MD  
cholecalciferol, vitamin D3, (VITAMIN D3) 2,000 unit tab Take 1 Tab by mouth daily. Yes Provider, Historical  
aspirin 81 mg tablet Take 81 mg by mouth daily. Yes Provider, Historical  
 
 
 
Allergies Allergen Reactions  Lisinopril Other (comments) High K  
 
 
 
REVIEW OF SYSTEMS: 
Per HPI PHYSICAL EXAM: 
Visit Vitals /71 Pulse 68 Temp 97.8 °F (36.6 °C) (Oral) Resp 18 Ht 5' 3\" (1.6 m) Wt 120 lb 12.8 oz (54.8 kg) SpO2 98% BMI 21.40 kg/m² Constitutional: Appears well-developed and well-nourished. No distress. HENT:  
Head: Normocephalic and atraumatic. Eyes: No scleral icterus. Neck: no lad, no tm, supple, no carotid bruits Cardiovascular: Normal S1/S2, regular rhythm. No murmurs, rubs, or gallops. Pulmonary/Chest: Effort normal and breath sounds normal. No respiratory distress. No wheezes, rhonchi, or rales. Abdomen: Soft, NT/ND, +BS, no rebound or guarding, no masses, no HSM appreciated. Ext: No edema. Neurological: Alert. Psychiatric: Normal mood and affect. Behavior is normal. 
  
Lab Results Component Value Date/Time Sodium 143 08/10/2018 12:12 PM  
 Potassium 5.3 (H) 08/10/2018 12:12 PM  
 Chloride 103 08/10/2018 12:12 PM  
 CO2 22 08/10/2018 12:12 PM  
 Anion gap 9 01/17/2014 05:49 PM  
 Glucose 95 08/10/2018 12:12 PM  
 BUN 17 08/10/2018 12:12 PM  
 Creatinine 0.91 08/10/2018 12:12 PM  
 BUN/Creatinine ratio 19 08/10/2018 12:12 PM  
 GFR est AA 70 08/10/2018 12:12 PM  
 GFR est non-AA 61 08/10/2018 12:12 PM  
 Calcium 9.7 08/10/2018 12:12 PM  
 Bilirubin, total 0.6 08/10/2018 12:12 PM  
 AST (SGOT) 22 08/10/2018 12:12 PM  
 Alk.  phosphatase 101 08/10/2018 12:12 PM  
 Protein, total 7.0 08/10/2018 12:12 PM  
 Albumin 4.7 08/10/2018 12:12 PM  
 Globulin 4.1 (H) 01/17/2014 05:49 PM  
 A-G Ratio 2.0 08/10/2018 12:12 PM  
 ALT (SGPT) 11 08/10/2018 12:12 PM  
 
Lab Results Component Value Date/Time Hemoglobin A1c 5.8 (H) 08/10/2018 12:12 PM  
 Hemoglobin A1c 5.7 (H) 04/13/2018 09:24 AM  
 Hemoglobin A1c 5.9 (H) 06/01/2017 03:21 PM  
  
Lab Results Component Value Date/Time Cholesterol, total 202 (H) 04/13/2018 09:24 AM  
 HDL Cholesterol 81 04/13/2018 09:24 AM  
 LDL, calculated 109 (H) 04/13/2018 09:24 AM  
 VLDL, calculated 12 04/13/2018 09:24 AM  
 Triglyceride 61 04/13/2018 09:24 AM  
 CHOL/HDL Ratio 3.1 09/28/2011 05:00 AM  
  
 
 
ASSESSMENT/PLAN Diagnoses and all orders for this visit: 
 
1. Hyperlipidemia, unspecified hyperlipidemia type -     LIPID PANEL 
-     METABOLIC PANEL, COMPREHENSIVE 2. IFG (impaired fasting glucose) 
-     HEMOGLOBIN A1C WITH EAG 
 
3. Gastroesophageal reflux disease, esophagitis presence not specified 4. Essential hypertension 5. Coronary artery disease involving native coronary artery of native heart without angina pectoris Check a1c today. BP at goal with current medications but some edema with higher dose amlodipine - continue for now. Recently started zetia, will check lipids knowing may need more time to get to goal. GERD controlled. Health Maintenance Due Topic Date Due  Shingrix Vaccine Age 50> (1 of 2) 12/21/1989  Pneumococcal 65+ Low/Medium Risk (2 of 2 - PPSV23) 06/01/2018  GLAUCOMA SCREENING Q2Y  02/27/2019 Follow-up Disposition: 
Return in about 6 months (around 8/12/2019) for bp, chol, ifg.  
 
 
Reviewed plan of care. Patient has provided input and agrees with goals. The nurse provided the patient and/or family with advanced directive information if needed and encouraged the patient to provide a copy to the office when available.

## 2019-02-13 LAB
ALBUMIN SERPL-MCNC: 4.4 G/DL (ref 3.5–4.8)
ALBUMIN/GLOB SERPL: 1.8 {RATIO} (ref 1.2–2.2)
ALP SERPL-CCNC: 91 IU/L (ref 39–117)
ALT SERPL-CCNC: 22 IU/L (ref 0–32)
AST SERPL-CCNC: 23 IU/L (ref 0–40)
BILIRUB SERPL-MCNC: 0.7 MG/DL (ref 0–1.2)
BUN SERPL-MCNC: 17 MG/DL (ref 8–27)
BUN/CREAT SERPL: 18 (ref 12–28)
CALCIUM SERPL-MCNC: 9.9 MG/DL (ref 8.7–10.3)
CHLORIDE SERPL-SCNC: 105 MMOL/L (ref 96–106)
CHOLEST SERPL-MCNC: 191 MG/DL (ref 100–199)
CO2 SERPL-SCNC: 23 MMOL/L (ref 20–29)
CREAT SERPL-MCNC: 0.97 MG/DL (ref 0.57–1)
EST. AVERAGE GLUCOSE BLD GHB EST-MCNC: 126 MG/DL
GLOBULIN SER CALC-MCNC: 2.5 G/DL (ref 1.5–4.5)
GLUCOSE SERPL-MCNC: 105 MG/DL (ref 65–99)
HBA1C MFR BLD: 6 % (ref 4.8–5.6)
HDLC SERPL-MCNC: 78 MG/DL
LDLC SERPL CALC-MCNC: 98 MG/DL (ref 0–99)
POTASSIUM SERPL-SCNC: 4.8 MMOL/L (ref 3.5–5.2)
PROT SERPL-MCNC: 6.9 G/DL (ref 6–8.5)
SODIUM SERPL-SCNC: 143 MMOL/L (ref 134–144)
TRIGL SERPL-MCNC: 74 MG/DL (ref 0–149)
VLDLC SERPL CALC-MCNC: 15 MG/DL (ref 5–40)

## 2019-03-27 ENCOUNTER — TELEPHONE (OUTPATIENT)
Dept: INTERNAL MEDICINE CLINIC | Facility: CLINIC | Age: 79
End: 2019-03-27

## 2019-03-27 RX ORDER — LOSARTAN POTASSIUM 25 MG/1
25 TABLET ORAL DAILY
Qty: 90 TAB | Refills: 3 | Status: SHIPPED | OUTPATIENT
Start: 2019-03-27 | End: 2019-08-09 | Stop reason: SDUPTHER

## 2019-04-01 RX ORDER — CARVEDILOL 6.25 MG/1
6.25 TABLET ORAL 2 TIMES DAILY
Qty: 180 TAB | Refills: 1 | Status: SHIPPED | OUTPATIENT
Start: 2019-04-01 | End: 2019-08-08 | Stop reason: SDUPTHER

## 2019-04-01 RX ORDER — CLOPIDOGREL BISULFATE 75 MG/1
75 TABLET ORAL DAILY
Qty: 90 TAB | Refills: 1 | Status: SHIPPED | OUTPATIENT
Start: 2019-04-01 | End: 2019-08-09 | Stop reason: SDUPTHER

## 2019-04-01 NOTE — TELEPHONE ENCOUNTER
Requested Prescriptions     Signed Prescriptions Disp Refills    clopidogrel (PLAVIX) 75 mg tab 90 Tab 1     Sig: Take 1 Tab by mouth daily. Authorizing Provider: [place]     Ordering User: Kylah Das     Verbal order per Dr. Álvaro Olivier.     Future Appointments   Date Time Provider Malachi Millard   4/9/2019  8:30 AM Monique Peguero NP [facility]   5/15/2019 10:20 AM Miriam Goodman  [address] 8/21/2019  9:45 AM Rosalie Christine MD [facility]

## 2019-04-01 NOTE — TELEPHONE ENCOUNTER
Requested Prescriptions     Signed Prescriptions Disp Refills    carvedilol (COREG) 6.25 mg tablet 180 Tab 1     Sig: Take 1 Tab by mouth two (2) times a day. Authorizing Provider: Alex Hook     Ordering User: Henry Ramos     Verbal order per Dr. Simon Luna.       Future Appointments   Date Time Provider Malachi Millard   4/9/2019  8:30 AM Clarissa Pillai NP [facility]   5/15/2019 10:20 AM Gus Camarillo  [address] 8/21/2019  9:45 AM Carlene Rankin MD [facility]

## 2019-04-07 NOTE — PROGRESS NOTES
HPI  Celena Almanza is a 77y.o. year old female patient of Mina Craig MD who presents with c/o BP check. Pt has history of has HTN (hypertension), Allergic rhinitis, CAD (coronary artery disease), PAC (premature atrial contraction), Esophageal reflux, Other and unspecified hyperlipidemia, GERD (gastroesophageal reflux disease), PVC (premature ventricular contraction), Carotid artery stenosis, IFG (impaired fasting glucose), Heart palpitations, Hyperlipidemia, and Coronary artery disease involving native coronary artery of native heart without angina pectoris on their problem list.. HTN- patient takes coreg 6.25, amlodipine 5mg, and losartan  Losartan increased by Dr. Ralph Read on 3-27 due to pt's report of elevated BP via phone call. Amlodipine was increased to 10mg in January. She follows with Cardiology Dr. Diogenes Peña as well. States her BP has always fluctuated but this time as remained elevated despite increasing medications. Checks BP at home- last two days 723A systolic. She has taken her BP medicine this AM.  Denies chest pain or pressure. Can feel her heart beat when she lays on her back. Has had some palpitations, states chronic for her. Has chronic GERD, taking pepcid 20mg BID prior to eating. C/o discomfort in throat, worse after she eats. Has frequent belching. No pain with swallowing. Sometimes has choking sensation but hasn't choked. Doesn't feel like food is getting stuck. Had an EGD long time ago, thinks it was normal. Feels like heartburn flares up more when BP is up. Going on a trip Thursday AM to see her daughter. Has LE edema in her ankles at nightime only. Has appt with cardiologist next month. Gets occasional lightheadedness describes as wooziness. Home number [phone] prefers we call this # with lab results, may leave VM.      Patient Active Problem List   Diagnosis Code    HTN (hypertension) I10    Allergic rhinitis J30.9    CAD (coronary artery disease) I25.10    PAC (premature atrial contraction) I49.1    Esophageal reflux K21.9    Other and unspecified hyperlipidemia E78.5    GERD (gastroesophageal reflux disease) K21.9    PVC (premature ventricular contraction) I49.3    Carotid artery stenosis I65.29    IFG (impaired fasting glucose) R73.01    Heart palpitations R00.2    Hyperlipidemia E78.5    Coronary artery disease involving native coronary artery of native heart without angina pectoris I25.10     Past Medical History:   Diagnosis Date    CAD (coronary artery disease)     CKD (chronic kidney disease), stage III ([facility] [address] 9/26/2014    Followed by Dr. 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 Caceres Session History     Socioeconomic History    Marital status:      Spouse name: Not on file    Number of children: Not on file    Years of education: Not on file    Highest education level: Not on file   Tobacco Use    Smoking status: Never Smoker    Smokeless tobacco: Never Used   Substance and Sexual Activity    Alcohol use: No    Drug use: No    Sexual activity: Never     Family History   Problem Relation Age of Onset    Diabetes Maternal Grandmother     Diabetes Paternal Grandfather     Hypertension Mother      Allergies   Allergen Reactions    Lisinopril Other (comments)     High K       MEDICATIONS  Current Outpatient Medications   Medication Sig    carvedilol (COREG) 6.25 mg tablet Take 1 Tab by mouth two (2) times a day.  clopidogrel (PLAVIX) 75 mg tab Take 1 Tab by mouth daily.  losartan (COZAAR) 25 mg tablet Take 1 Tab by mouth daily.     famotidine (PEPCID) 20 mg tablet Take 1 Tab by mouth two (2) times a day.  amLODIPine (NORVASC) 5 mg tablet Take 2 Tabs by mouth daily.  ezetimibe (ZETIA) 10 mg tablet Take 1 Tab by mouth daily.  rosuvastatin (CRESTOR) 20 mg tablet Take 1 Tab by mouth nightly.  nicotinic acid (NIACIN) 500 mg tablet Take 500 mg by mouth Daily (before breakfast).  cholecalciferol, vitamin D3, (VITAMIN D3) 2,000 unit tab Take 1 Tab by mouth daily.  aspirin 81 mg tablet Take 81 mg by mouth daily. No current facility-administered medications for this visit. REVIEW OF SYSTEMS  Per HPI        Visit Vitals  /58   Pulse 76   Temp 98.3 °F (36.8 °C) (Oral)   Resp 18   Ht 5' 3\" (1.6 m)   Wt 120 lb 12.8 oz (54.8 kg)   SpO2 98%   BMI 21.40 kg/m²         General: Well-developed, well-nourished. In no distress. A&O x 3. Head: Normocephalic, atraumatic. Eyes: Conjunctiva clear. Mouth/Throat: Lips, mucosa, and tongue normal. Oropharynx benign. Neck: Supple, symmetrical, trachea midline, no lymphadenopathy, no carotid bruits, no JVD, thyroid: not enlarged, symmetric, no tenderness/mass/nodules. Lungs: Clear to auscultation bilaterally. No crackles or wheezes. No use of accessory muscles. Speaks in full sentences without SOB. Chest Wall: No tenderness or deformity. Heart: RRR, normal S1 and S2, no murmur, click, rub, or gallop. Skin: No rashes or lesions. Neurovasc: No edema appreciated. Dorsalis pedis pulses are 2+ on the right side, and 2+ on the left side. Posterior tibial pulses are 2+ on the right side, and 2+ on the left side. Musculoskeletal: Gait normal.   Psychiatric: Normal mood and affect.  Behavior is normal.       Lab Results   Component Value Date/Time    Sodium 143 02/12/2019 10:36 AM    Potassium 4.8 02/12/2019 10:36 AM    Chloride 105 02/12/2019 10:36 AM    CO2 23 02/12/2019 10:36 AM    Anion gap 9 01/17/2014 05:49 PM    Glucose 105 (H) 02/12/2019 10:36 AM    BUN 17 02/12/2019 10:36 AM    Creatinine 0.97 02/12/2019 10:36 AM    BUN/Creatinine ratio 18 02/12/2019 10:36 AM    GFR est AA 64 02/12/2019 10:36 AM    GFR est non-AA 56 (L) 02/12/2019 10:36 AM    Calcium 9.9 02/12/2019 10:36 AM    Bilirubin, total 0.7 02/12/2019 10:36 AM    AST (SGOT) 23 02/12/2019 10:36 AM    Alk. phosphatase 91 02/12/2019 10:36 AM    Protein, total 6.9 02/12/2019 10:36 AM    Albumin 4.4 02/12/2019 10:36 AM    Globulin 4.1 (H) 01/17/2014 05:49 PM    A-G Ratio 1.8 02/12/2019 10:36 AM    ALT (SGPT) 22 02/12/2019 10:36 AM         ASSESSMENT and PLAN  Diagnoses and all orders for this visit:    1. Essential hypertension  -     METABOLIC PANEL, BASIC  BP within goal, continue current medications. Check labs today due to hx of hyperkalemia on Losartan. Pt prefers to be called at home number with results. 2. Gastroesophageal reflux disease, esophagitis presence not specified  Offered to refer pt to GI for possible EGD, she declines at this time. She will continue Pepcid. Recommend small meals, and other lifestyle measures as below. Patient Instructions     Continue your current medications. Continue to check blood pressures at home every few days after sitting quietly for 15 minutes with legs uncrossed. Your goal blood pressure is <140/90. For acid reflux:  Elevate the head of the bed on blocks at least 6-8 inches high. Weight loss (if indicated) can be helpful in reducing or completely eliminating symptoms. Avoid acidic, spicy, greasy, tomato-based, dairy, and high-fat foods. Avoid caffeine, carbonated beverages, chocolate, and peppermint. Avoid NSAIDs- Ibuprofen, Motrin, Advil, and Aleve. Avoid tobacco and excessive alcohol use. High Blood Pressure: Care Instructions  Overview    It's normal for blood pressure to go up and down throughout the day. But if it stays up, you have high blood pressure. Another name for high blood pressure is hypertension.   Despite what a lot of people think, high blood pressure usually doesn't cause headaches or make you feel dizzy or lightheaded. It usually has no symptoms. But it does increase your risk of stroke, heart attack, and other problems. You and your doctor will talk about your risks of these problems based on your blood pressure. Your doctor will give you a goal for your blood pressure. Your goal will be based on your health and your age. Lifestyle changes, such as eating healthy and being active, are always important to help lower blood pressure. You might also take medicine to reach your blood pressure goal.  Follow-up care is a key part of your treatment and safety. Be sure to make and go to all appointments, and call your doctor if you are having problems. It's also a good idea to know your test results and keep a list of the medicines you take. How can you care for yourself at home? Medical treatment  · If you stop taking your medicine, your blood pressure will go back up. You may take one or more types of medicine to lower your blood pressure. Be safe with medicines. Take your medicine exactly as prescribed. Call your doctor if you think you are having a problem with your medicine. · Talk to your doctor before you start taking aspirin every day. Aspirin can help certain people lower their risk of a heart attack or stroke. But taking aspirin isn't right for everyone, because it can cause serious bleeding. · See your doctor regularly. You may need to see the doctor more often at first or until your blood pressure comes down. · If you are taking blood pressure medicine, talk to your doctor before you take decongestants or anti-inflammatory medicine, such as ibuprofen. Some of these medicines can raise blood pressure. · Learn how to check your blood pressure at home. Lifestyle changes  · Stay at a healthy weight.  This is especially important if you put on weight around the waist. Losing even 10 pounds can help you lower your blood pressure. · If your doctor recommends it, get more exercise. Walking is a good choice. Bit by bit, increase the amount you walk every day. Try for at least 30 minutes on most days of the week. You also may want to swim, bike, or do other activities. · Avoid or limit alcohol. Talk to your doctor about whether you can drink any alcohol. · Try to limit how much sodium you eat to less than 2,300 milligrams (mg) a day. Your doctor may ask you to try to eat less than 1,500 mg a day. · Eat plenty of fruits (such as bananas and oranges), vegetables, legumes, whole grains, and low-fat dairy products. · Lower the amount of saturated fat in your diet. Saturated fat is found in animal products such as milk, cheese, and meat. Limiting these foods may help you lose weight and also lower your risk for heart disease. · Do not smoke. Smoking increases your risk for heart attack and stroke. If you need help quitting, talk to your doctor about stop-smoking programs and medicines. These can increase your chances of quitting for good. When should you call for help? Call 911 anytime you think you may need emergency care. This may mean having symptoms that suggest that your blood pressure is causing a serious heart or blood vessel problem. Your blood pressure may be over 180/120.   For example, call 911 if:    · You have symptoms of a heart attack. These may include:  ? Chest pain or pressure, or a strange feeling in the chest.  ? Sweating. ? Shortness of breath. ? Nausea or vomiting. ? Pain, pressure, or a strange feeling in the back, neck, jaw, or upper belly or in one or both shoulders or arms. ? Lightheadedness or sudden weakness. ? A fast or irregular heartbeat.     · You have symptoms of a stroke. These may include:  ? Sudden numbness, tingling, weakness, or loss of movement in your face, arm, or leg, especially on only one side of your body. ? Sudden vision changes. ? Sudden trouble speaking.   ? Sudden confusion or trouble understanding simple statements. ? Sudden problems with walking or balance. ? A sudden, severe headache that is different from past headaches.     · You have severe back or belly pain.    Do not wait until your blood pressure comes down on its own. Get help right away.   Call your doctor now or seek immediate care if:    · Your blood pressure is much higher than normal (such as 180/120 or higher), but you don't have symptoms.     · You think high blood pressure is causing symptoms, such as:  ? Severe headache.  ? Blurry vision.    Watch closely for changes in your health, and be sure to contact your doctor if:    · Your blood pressure measures higher than your doctor recommends at least 2 times. That means the top number is higher or the bottom number is higher, or both.     · You think you may be having side effects from your blood pressure medicine. Where can you learn more? Go to http://mian-joseph.info/. Enter O479 in the search box to learn more about \"High Blood Pressure: Care Instructions. \"  Current as of: July 22, 2018  Content Version: 11.9  © 2553-7861 [employer]. Care instructions adapted under license by [employer] (which disclaims liability or warranty for this information). If you have questions about a medical condition or this instruction, always ask your healthcare professional. [place] [address] any warranty or liability for your use of this information. Please keep your follow-up appointment with Lesly Flores MD.         Health Maintenance Due   Topic Date Due    Pneumococcal 65+ years (1 of 2 - PCV13) 12/21/2004    GLAUCOMA SCREENING Q2Y  02/27/2019       I have discussed the diagnosis with the patient and the intended plan as seen in the above orders. Patient is in agreement. The patient has received an after-visit summary and questions were answered concerning future plans.   I have discussed medication side effects and warnings with the patient as well. Warning signs for the above conditions were discussed including when to call our office or go to the emergency room. The nurse provided the patient and/or family with advanced directive information if needed and encouraged the patient to provide a copy to the office when available.

## 2019-04-09 ENCOUNTER — OFFICE VISIT (OUTPATIENT)
Dept: INTERNAL MEDICINE CLINIC | Facility: CLINIC | Age: 79
End: 2019-04-09

## 2019-04-09 VITALS
DIASTOLIC BLOOD PRESSURE: 58 MMHG | HEART RATE: 76 BPM | OXYGEN SATURATION: 98 % | SYSTOLIC BLOOD PRESSURE: 140 MMHG | RESPIRATION RATE: 18 BRPM | WEIGHT: 120.8 LBS | HEIGHT: 63 IN | TEMPERATURE: 98.3 F | BODY MASS INDEX: 21.4 KG/M2

## 2019-04-09 DIAGNOSIS — I10 ESSENTIAL HYPERTENSION: Primary | ICD-10-CM

## 2019-04-09 DIAGNOSIS — K21.9 GASTROESOPHAGEAL REFLUX DISEASE, ESOPHAGITIS PRESENCE NOT SPECIFIED: ICD-10-CM

## 2019-04-09 NOTE — PROGRESS NOTES
Vernon Ludwig  Identified pt with two pt identifiers(name and ). Chief Complaint   Patient presents with    Blood Pressure Check       Reviewed record In preparation for visit and have obtained necessary documentation. Has info on advanced directive but has not filled them out. 1. Have you been to the ER, urgent care clinic or hospitalized since your last visit? No     2. Have you seen or consulted any other health care providers outside of the [address] [place] [address] since your last visit? Include any pap smears or colon screening. No    Vitals reviewed with provider.     Health Maintenance reviewed:     Health Maintenance Due   Topic    Pneumococcal 65+ years (1 of 2 - PCV13)    GLAUCOMA SCREENING Q2Y           Wt Readings from Last 3 Encounters:   19 120 lb 12.8 oz (54.8 kg)   19 120 lb 12.8 oz (54.8 kg)   19 123 lb (55.8 kg)        Temp Readings from Last 3 Encounters:   19 98.3 °F (36.8 °C) (Oral)   19 97.8 °F (36.6 °C) (Oral)   19 98 °F (36.7 °C) (Oral)        BP Readings from Last 3 Encounters:   19 155/73   19 131/71   19 181/72        Pulse Readings from Last 3 Encounters:   19 76   19 68   19 71        Vitals:    19 0825   BP: 155/73   Pulse: 76   Resp: 18   Temp: 98.3 °F (36.8 °C)   TempSrc: Oral   SpO2: 98%   Weight: 120 lb 12.8 oz (54.8 kg)   Height: 5' 3\" (1.6 m)   PainSc:   0 - No pain          Learning Assessment:   :       Learning Assessment 2014   PRIMARY LEARNER Patient   HIGHEST LEVEL OF EDUCATION - PRIMARY LEARNER  DID NOT GRADUATE HIGH SCHOOL   BARRIERS PRIMARY LEARNER NONE   CO-LEARNER CAREGIVER No   PRIMARY LANGUAGE ENGLISH   LEARNER PREFERENCE PRIMARY DEMONSTRATION   ANSWERED BY patient   RELATIONSHIP SELF        Depression Screening:   :       3 most recent PHQ Screens 2019   Little interest or pleasure in doing things Not at all   Feeling down, depressed, irritable, or hopeless Not at all   Total Score PHQ 2 0        Fall Risk Assessment:   :       Fall Risk Assessment, last 12 mths 8/10/2018   Able to walk? Yes   Fall in past 12 months? No   Fall with injury? -   Number of falls in past 12 months -   Fall Risk Score -        Abuse Screening:   :       Abuse Screening Questionnaire 8/10/2018 3/6/2017 3/16/2016 6/13/2014   Do you ever feel afraid of your partner? N N N N   Are you in a relationship with someone who physically or mentally threatens you? N N N N   Is it safe for you to go home?  Y Y Y Y        ADL Screening:   :       ADL Assessment 3/6/2017   Feeding yourself No Help Needed   Getting from bed to chair No Help Needed   Getting dressed No Help Needed   Bathing or showering No Help Needed   Walk across the room (includes cane/walker) No Help Needed   Using the telphone No Help Needed   Taking your medications No Help Needed   Preparing meals No Help Needed   Managing money (expenses/bills) No Help Needed   Moderately strenuous housework (laundry) No Help Needed   Shopping for personal items (toiletries/medicines) No Help Needed   Shopping for groceries No Help Needed   Driving No Help Needed   Climbing a flight of stairs No Help Needed   Getting to places beyond walking distances No Help Needed

## 2019-04-09 NOTE — PATIENT INSTRUCTIONS
Continue your current medications. Continue to check blood pressures at home every few days after sitting quietly for 15 minutes with legs uncrossed. Your goal blood pressure is <140/90. For acid reflux:  Elevate the head of the bed on blocks at least 6-8 inches high. Weight loss (if indicated) can be helpful in reducing or completely eliminating symptoms. Avoid acidic, spicy, greasy, tomato-based, dairy, and high-fat foods. Avoid caffeine, carbonated beverages, chocolate, and peppermint. Avoid NSAIDs- Ibuprofen, Motrin, Advil, and Aleve. Avoid tobacco and excessive alcohol use. High Blood Pressure: Care Instructions  Overview    It's normal for blood pressure to go up and down throughout the day. But if it stays up, you have high blood pressure. Another name for high blood pressure is hypertension. Despite what a lot of people think, high blood pressure usually doesn't cause headaches or make you feel dizzy or lightheaded. It usually has no symptoms. But it does increase your risk of stroke, heart attack, and other problems. You and your doctor will talk about your risks of these problems based on your blood pressure. Your doctor will give you a goal for your blood pressure. Your goal will be based on your health and your age. Lifestyle changes, such as eating healthy and being active, are always important to help lower blood pressure. You might also take medicine to reach your blood pressure goal.  Follow-up care is a key part of your treatment and safety. Be sure to make and go to all appointments, and call your doctor if you are having problems. It's also a good idea to know your test results and keep a list of the medicines you take. How can you care for yourself at home? Medical treatment  · If you stop taking your medicine, your blood pressure will go back up. You may take one or more types of medicine to lower your blood pressure. Be safe with medicines.  Take your medicine exactly as prescribed. Call your doctor if you think you are having a problem with your medicine. · Talk to your doctor before you start taking aspirin every day. Aspirin can help certain people lower their risk of a heart attack or stroke. But taking aspirin isn't right for everyone, because it can cause serious bleeding. · See your doctor regularly. You may need to see the doctor more often at first or until your blood pressure comes down. · If you are taking blood pressure medicine, talk to your doctor before you take decongestants or anti-inflammatory medicine, such as ibuprofen. Some of these medicines can raise blood pressure. · Learn how to check your blood pressure at home. Lifestyle changes  · Stay at a healthy weight. This is especially important if you put on weight around the waist. Losing even 10 pounds can help you lower your blood pressure. · If your doctor recommends it, get more exercise. Walking is a good choice. Bit by bit, increase the amount you walk every day. Try for at least 30 minutes on most days of the week. You also may want to swim, bike, or do other activities. · Avoid or limit alcohol. Talk to your doctor about whether you can drink any alcohol. · Try to limit how much sodium you eat to less than 2,300 milligrams (mg) a day. Your doctor may ask you to try to eat less than 1,500 mg a day. · Eat plenty of fruits (such as bananas and oranges), vegetables, legumes, whole grains, and low-fat dairy products. · Lower the amount of saturated fat in your diet. Saturated fat is found in animal products such as milk, cheese, and meat. Limiting these foods may help you lose weight and also lower your risk for heart disease. · Do not smoke. Smoking increases your risk for heart attack and stroke. If you need help quitting, talk to your doctor about stop-smoking programs and medicines. These can increase your chances of quitting for good. When should you call for help?   Call 911 anytime you think you may need emergency care. This may mean having symptoms that suggest that your blood pressure is causing a serious heart or blood vessel problem. Your blood pressure may be over 180/120.   For example, call 911 if:    · You have symptoms of a heart attack. These may include:  ? Chest pain or pressure, or a strange feeling in the chest.  ? Sweating. ? Shortness of breath. ? Nausea or vomiting. ? Pain, pressure, or a strange feeling in the back, neck, jaw, or upper belly or in one or both shoulders or arms. ? Lightheadedness or sudden weakness. ? A fast or irregular heartbeat.     · You have symptoms of a stroke. These may include:  ? Sudden numbness, tingling, weakness, or loss of movement in your face, arm, or leg, especially on only one side of your body. ? Sudden vision changes. ? Sudden trouble speaking. ? Sudden confusion or trouble understanding simple statements. ? Sudden problems with walking or balance. ? A sudden, severe headache that is different from past headaches.     · You have severe back or belly pain.    Do not wait until your blood pressure comes down on its own. Get help right away.   Call your doctor now or seek immediate care if:    · Your blood pressure is much higher than normal (such as 180/120 or higher), but you don't have symptoms.     · You think high blood pressure is causing symptoms, such as:  ? Severe headache.  ? Blurry vision.    Watch closely for changes in your health, and be sure to contact your doctor if:    · Your blood pressure measures higher than your doctor recommends at least 2 times. That means the top number is higher or the bottom number is higher, or both.     · You think you may be having side effects from your blood pressure medicine. Where can you learn more? Go to http://mian-joseph.info/. Enter S016 in the search box to learn more about \"High Blood Pressure: Care Instructions. \"  Current as of: July 22, 2018  Content Version: 11.9  © 9939-5604 [employer]. Care instructions adapted under license by [employer] (which disclaims liability or warranty for this information). If you have questions about a medical condition or this instruction, always ask your healthcare professional. [address] any warranty or liability for your use of this information.

## 2019-04-10 LAB
BUN SERPL-MCNC: 24 MG/DL (ref 8–27)
BUN/CREAT SERPL: 22 (ref 12–28)
CALCIUM SERPL-MCNC: 10 MG/DL (ref 8.7–10.3)
CHLORIDE SERPL-SCNC: 105 MMOL/L (ref 96–106)
CO2 SERPL-SCNC: 22 MMOL/L (ref 20–29)
CREAT SERPL-MCNC: 1.11 MG/DL (ref 0.57–1)
GLUCOSE SERPL-MCNC: 118 MG/DL (ref 65–99)
POTASSIUM SERPL-SCNC: 4.8 MMOL/L (ref 3.5–5.2)
SODIUM SERPL-SCNC: 143 MMOL/L (ref 134–144)

## 2019-04-10 NOTE — PROGRESS NOTES
Pls call pt at [phone] (may leave VM per pt) and advise her potassium level is within normal range.

## 2019-05-08 RX ORDER — EZETIMIBE 10 MG/1
10 TABLET ORAL DAILY
Qty: 30 TAB | Refills: 3 | Status: SHIPPED | OUTPATIENT
Start: 2019-05-08 | End: 2019-08-09 | Stop reason: SDUPTHER

## 2019-05-08 NOTE — TELEPHONE ENCOUNTER
Requested Prescriptions     Signed Prescriptions Disp Refills    ezetimibe (ZETIA) 10 mg tablet 30 Tab 3     Sig: Take 1 Tab by mouth daily.      Authorizing Provider: Arely Quigley     Ordering User: Stone Nielson verbal orders

## 2019-05-15 ENCOUNTER — OFFICE VISIT (OUTPATIENT)
Dept: CARDIOLOGY CLINIC | Age: 79
End: 2019-05-15

## 2019-05-15 VITALS
HEIGHT: 63 IN | BODY MASS INDEX: 21.26 KG/M2 | WEIGHT: 120 LBS | DIASTOLIC BLOOD PRESSURE: 68 MMHG | OXYGEN SATURATION: 98 % | SYSTOLIC BLOOD PRESSURE: 140 MMHG | HEART RATE: 72 BPM | RESPIRATION RATE: 16 BRPM

## 2019-05-15 DIAGNOSIS — I25.10 CORONARY ARTERY DISEASE INVOLVING NATIVE CORONARY ARTERY OF NATIVE HEART WITHOUT ANGINA PECTORIS: Primary | ICD-10-CM

## 2019-05-15 NOTE — PATIENT INSTRUCTIONS
You have fluid related to the increased amlodipine. This is typical at a daily dose of 10mg. You may wish to wear compression stockings to help with that or we can trial a fluid pill as needed for the swelling.

## 2019-05-15 NOTE — PROGRESS NOTES
[facility]: Joy Powell  [phone]    HPI: Dago Casas, a 77y.o. year-old who presents for follow up regarding her CAD. Last visit her BP was high but it is ok now after increasing her amlodipine and losartan. Thinks it may be related to her  needing more care/attention. Will need K checked in about 3 months to se if the losartan is making it rise on the higher dose. At times she feels woozy in the head. Continues to walk regularly, leg is better now. Leg cramps are much better now. She feels some palpitations at night but nothing sustained or near-syncope. Has edema due to the amlodipine and discussed options for that. She continues to walk regularly without symptoms and feels well with it. Walks 30 min a day 5-6 days a week. No blood in the stool or in the urine. No more chest tightness bother ing her at this time. Old HDL labs:    HDL 75 Tg 78  High ApoB, LDLp SdLDL lpa >225!!!!!  Nl Lpla2, bnp 194, Normal aspirin works  Insulin normal, glucose 109, Homocysteine 19  Vit D 32  LFT/CMp normal    Assessment/Plan:  1. HTN-at goal today on losartan, coreg, amlodipine. 2. CAD- stable, s/p TRANG RCA and midLAD 3/11   -Cath 8/12 with proximal RCA stent,  No chest pain now  -lifelong plavix and aspirin(her decision after discussion of risks and benefits)  3. Palpitations- better on coreg, still v-bigemeny at last holter  4. Hyperlipidemia- crestor 20, welchol, trilipix. She declines crestor 40.   -LDL 98 now declines titration of meds, declines PCSK9 also  -no Zetia due to cost.   5. Anxiety- better since she stopped checking her bp. 6. Reflux- on Nexium, and stable  7. Vit D deficiency 2000u/day, continue, at goal now    8. Impaired glucose tolerance- a1C  Still good  9. CKD Stage 3-stable now, improved, seen by Dr. Randy Harley. -hyperkalemia in the past improved with stopping spironolactone, 4.8 now, also cut losartan for same  10.  Mild to moderate mitral regurgitation- by TTE 11/15    Stress nuc 4/18 wnl  Echo 11/15 EF 60%, mild TR, PAP 25, mild-mod MR  Carotid US: Mod LICA plaque <37%, no evidence of Subclavian Steal.  Stress nuc: 1/14 normal, EF 69%  Echo 9/12 EF 60, mod MR, mild TR  Cath 2012 RCA stent  Fhx no early cad  Soc no tob, no etoh    She  has a past medical history of CAD (coronary artery disease), CKD (chronic kidney disease), stage III ([address] (9/26/2014), HTN (hypertension) (10/1/2010), Hyperlipidemia, and Hypertension. Cardiovascular ROS: positive for - chest pain  Respiratory ROS: negative for - cough, hemoptysis, pleuritic pain or shortness of breath  Neurological ROS: no TIA or stroke symptoms  All other systems negative except as above. PE  Vitals:    05/15/19 1025   BP: 140/68   Pulse: 72   Resp: 16   SpO2: 98%   Weight: 120 lb (54.4 kg)   Height: 5' 3\" (1.6 m)      Body mass index is 21.26 kg/m².    General appearance - alert, well appearing, and in no distress  Mental status - affect appropriate to mood  Eyes - sclera anicteric, moist mucous membranes  Neck - supple, no significant adenopathy  Lymphatics - no  lymphadenopathy  Chest - clear to auscultation, no wheezes, rales or rhonchi  Heart - normal rate, regular rhythm, normal S1, S2, no murmurs, rubs, clicks or gallops  Abdomen - soft, nontender, nondistended, no masses or organomegaly  Back exam - full range of motion, no tenderness  Neurological - cranial nerves II through XII grossly intact, no focal deficit  Musculoskeletal - no muscular tenderness noted, normal strength  Extremities - peripheral pulses normal, no pedal edema  Skin - normal coloration  no rashes    Recent Labs:  Lab Results   Component Value Date/Time    Cholesterol, total 191 02/12/2019 10:36 AM    HDL Cholesterol 78 02/12/2019 10:36 AM    LDL, calculated 98 02/12/2019 10:36 AM    Triglyceride 74 02/12/2019 10:36 AM    CHOL/HDL Ratio 3.1 09/28/2011 05:00 AM       Lab Results   Component Value Date/Time Creatinine 1.11 (H) 04/09/2019 09:10 AM       Lab Results   Component Value Date/Time    BUN 24 04/09/2019 09:10 AM       Lab Results   Component Value Date/Time    Potassium 4.8 04/09/2019 09:10 AM       Lab Results   Component Value Date/Time    Hemoglobin A1c 6.0 (H) 02/12/2019 10:36 AM       Lab Results   Component Value Date/Time    HGB 14.3 08/10/2018 12:12 PM       Lab Results   Component Value Date/Time    PLATELET 755 11/04/2410 12:12 PM         Reviewed:  Past Medical History:   Diagnosis Date    CAD (coronary artery disease)     CKD (chronic kidney disease), stage III ([facility] 9/26/2014    Followed by Dr. 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  carvedilol (COREG) 6.25 mg tablet Take 1 Tab by mouth two (2) times a day.  clopidogrel (PLAVIX) 75 mg tab Take 1 Tab by mouth daily.  losartan (COZAAR) 25 mg tablet Take 1 Tab by mouth daily.  famotidine (PEPCID) 20 mg tablet Take 1 Tab by mouth two (2) times a day.  amLODIPine (NORVASC) 5 mg tablet Take 2 Tabs by mouth daily.  rosuvastatin (CRESTOR) 20 mg tablet Take 1 Tab by mouth nightly.  nicotinic acid (NIACIN) 500 mg tablet Take 500 mg by mouth Daily (before breakfast).  cholecalciferol, vitamin D3, (VITAMIN D3) 2,000 unit tab Take 1 Tab by mouth daily.  aspirin 81 mg tablet Take 81 mg by mouth daily. No current facility-administered medications for this visit.           Aylin Reynoso MD  [facility] [employer] [facility] [place]  [address] [facility] [address]  [place], [place] [address]                 [facility] [employer]  [phone]    HPI: Tanner Engel, a 77y.o. year-old who presents for follow up regarding her CAD. Bp looks great today but mostly 130s at home, not eating well the last few days due to the sniffles. Walks but not as much in the weather. Continues to walk regularly, leg is better now. She thinks she is having trouble with her memory. Leg cramps are much better now. .     She continues to walk regularly without symptoms and feels well with it. Walks 30 min a day 5-6 days a week. No blood in the stool or in the urine. She is feeling tightness in the chest after working outside, walking long time, goes up into the chest. Lasts a while and goes away with rest. Given these new sx will get lexiscan nuc to evalaute for cad progression, She can not walk on the treadmill. Old HDL labs:    HDL 75 Tg 78  High ApoB, LDLp SdLDL lpa >225!!!!!  Nl Lpla2, bnp 194, Normal aspirin works  Insulin normal, glucose 109, Homocysteine 19  Vit D 32  LFT/CMp normal    Assessment/Plan:  1. HTN-at goal today on losartan, coreg, amlodipine. 2. CAD- stable, s/p TRANG RCA and midLAD 3/11   -Cath 8/12 with proximal RCA stent,  No chest pain now  -lifelong plavix and aspirin(her decision after discussion of risks and benefits)  3. Palpitations- better on coreg, still v-bigemeny at last holter  4. Hyperlipidemia- crestor 40, welchol, trilipix. - now   -no Zetia due to cost.   5. Anxiety- better since she stopped checking her bp. 6. Reflux- on Nexium, and stable  7. Vit D deficiency 2000u/day, continue, at goal now    8. Impaired glucose tolerance- a1C  Still good  9. CKD Stage 3-stable now, improved, seen by Dr. Elena Feliz. -hyperkalemia in the past improved with stopping spironolactone, 4.8 now, also cut losartan for same  10.  Mild to moderate mitral regurgitation- by TTE 11/15    Stress nuc 6/16 wnl  Echo 11/15 EF 60%, mild TR, PAP 25, mild-mod MR  Carotid US: Mod LICA plaque <80%, no evidence of Subclavian Steal.  Stress nuc: 1/14 normal, EF 69%  Echo 9/12 EF 60, mod MR, mild TR  Cath 2012 RCA stent  Fhx no early cad  Soc no tob, no etoh    She  has a past medical history of CAD (coronary artery disease), CKD (chronic kidney disease), stage III ([address] (9/26/2014), HTN (hypertension) (10/1/2010), Hyperlipidemia, and Hypertension. Cardiovascular ROS: positive for - chest pain  Respiratory ROS: negative for - cough, hemoptysis, pleuritic pain or shortness of breath  Neurological ROS: no TIA or stroke symptoms  All other systems negative except as above. PE  Vitals:    05/15/19 1025   BP: 140/68   Pulse: 72   Resp: 16   SpO2: 98%   Weight: 120 lb (54.4 kg)   Height: 5' 3\" (1.6 m)      Body mass index is 21.26 kg/m².    General appearance - alert, well appearing, and in no distress  Mental status - affect appropriate to mood  Eyes - sclera anicteric, moist mucous membranes  Neck - supple, no significant adenopathy  Lymphatics - no  lymphadenopathy  Chest - clear to auscultation, no wheezes, rales or rhonchi  Heart - normal rate, regular rhythm, normal S1, S2, no murmurs, rubs, clicks or gallops  Abdomen - soft, nontender, nondistended, no masses or organomegaly  Back exam - full range of motion, no tenderness  Neurological - cranial nerves II through XII grossly intact, no focal deficit  Musculoskeletal - no muscular tenderness noted, normal strength  Extremities - peripheral pulses normal, no pedal edema  Skin - normal coloration  no rashes    Recent Labs:  Lab Results   Component Value Date/Time    Cholesterol, total 191 02/12/2019 10:36 AM    HDL Cholesterol 78 02/12/2019 10:36 AM    LDL, calculated 98 02/12/2019 10:36 AM    Triglyceride 74 02/12/2019 10:36 AM    CHOL/HDL Ratio 3.1 09/28/2011 05:00 AM       Lab Results   Component Value Date/Time    Creatinine 1.11 (H) 04/09/2019 09:10 AM       Lab Results   Component Value Date/Time    BUN 24 04/09/2019 09:10 AM       Lab Results   Component Value Date/Time    Potassium 4.8 04/09/2019 09:10 AM       Lab Results   Component Value Date/Time    Hemoglobin A1c 6.0 (H) 02/12/2019 10:36 AM       Lab Results   Component Value Date/Time    HGB 14.3 08/10/2018 12:12 PM       Lab Results   Component Value Date/Time    PLATELET 241 17/92/6705 12:12 PM         Reviewed:  Past Medical History:   Diagnosis Date    CAD (coronary artery disease)     CKD (chronic kidney disease), stage III ([facility] [address] 9/26/2014    Followed by Dr. 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  carvedilol (COREG) 6.25 mg tablet Take 1 Tab by mouth two (2) times a day.  clopidogrel (PLAVIX) 75 mg tab Take 1 Tab by mouth daily.  losartan (COZAAR) 25 mg tablet Take 1 Tab by mouth daily.  famotidine (PEPCID) 20 mg tablet Take 1 Tab by mouth two (2) times a day.  amLODIPine (NORVASC) 5 mg tablet Take 2 Tabs by mouth daily.  rosuvastatin (CRESTOR) 20 mg tablet Take 1 Tab by mouth nightly.  nicotinic acid (NIACIN) 500 mg tablet Take 500 mg by mouth Daily (before breakfast).  cholecalciferol, vitamin D3, (VITAMIN D3) 2,000 unit tab Take 1 Tab by mouth daily.  aspirin 81 mg tablet Take 81 mg by mouth daily. No current facility-administered medications for this visit.           Sobia Rush MD  [facility] [place]  [address] [facility] [address]  [address]

## 2019-08-06 DIAGNOSIS — K21.9 GASTROESOPHAGEAL REFLUX DISEASE WITHOUT ESOPHAGITIS: ICD-10-CM

## 2019-08-08 RX ORDER — CARVEDILOL 6.25 MG/1
6.25 TABLET ORAL 2 TIMES DAILY
Qty: 180 TAB | Refills: 2 | Status: SHIPPED | OUTPATIENT
Start: 2019-08-08 | End: 2019-08-09 | Stop reason: SDUPTHER

## 2019-08-08 NOTE — TELEPHONE ENCOUNTER
Requested Prescriptions     Signed Prescriptions Disp Refills    carvedilol (COREG) 6.25 mg tablet 180 Tab 2     Sig: Take 1 Tab by mouth two (2) times a day.      Authorizing Provider: Janae Severe     Ordering User: Dragan Greenfield     Per verbal orders

## 2019-08-09 RX ORDER — CARVEDILOL 6.25 MG/1
6.25 TABLET ORAL 2 TIMES DAILY
Qty: 180 TAB | Refills: 3 | Status: SHIPPED | OUTPATIENT
Start: 2019-08-09 | End: 2020-06-02

## 2019-08-09 RX ORDER — ROSUVASTATIN CALCIUM 20 MG/1
20 TABLET, COATED ORAL
Qty: 90 TAB | Refills: 3 | Status: SHIPPED | OUTPATIENT
Start: 2019-08-09 | End: 2020-06-25

## 2019-08-09 RX ORDER — FAMOTIDINE 20 MG/1
20 TABLET, FILM COATED ORAL 2 TIMES DAILY
Qty: 180 TAB | Refills: 3 | Status: SHIPPED | OUTPATIENT
Start: 2019-08-09 | End: 2020-05-30

## 2019-08-09 RX ORDER — AMLODIPINE BESYLATE 5 MG/1
10 TABLET ORAL DAILY
Qty: 180 TAB | Refills: 3 | Status: SHIPPED | OUTPATIENT
Start: 2019-08-09 | End: 2020-06-25

## 2019-08-09 RX ORDER — LOSARTAN POTASSIUM 25 MG/1
25 TABLET ORAL DAILY
Qty: 90 TAB | Refills: 3 | Status: SHIPPED | OUTPATIENT
Start: 2019-08-09 | End: 2020-06-25

## 2019-08-09 RX ORDER — EZETIMIBE 10 MG/1
10 TABLET ORAL DAILY
Qty: 90 TAB | Refills: 3 | Status: SHIPPED | OUTPATIENT
Start: 2019-08-09 | End: 2020-07-18

## 2019-08-09 RX ORDER — CLOPIDOGREL BISULFATE 75 MG/1
75 TABLET ORAL DAILY
Qty: 90 TAB | Refills: 3 | Status: SHIPPED | OUTPATIENT
Start: 2019-08-09 | End: 2020-06-25

## 2019-08-09 NOTE — TELEPHONE ENCOUNTER
Requested Prescriptions     Signed Prescriptions Disp Refills    clopidogrel (PLAVIX) 75 mg tab 90 Tab 3     Sig: Take 1 Tab by mouth daily. Authorizing Provider: Bita Cardoso     Ordering User: Ethel De Guzman ezetimibe (ZETIA) 10 mg tablet 90 Tab 3     Sig: Take 1 Tab by mouth daily. Authorizing Provider: iBta Cardoso     Ordering User: Ethel De Guzman carvedilol (COREG) 6.25 mg tablet 180 Tab 3     Sig: Take 1 Tab by mouth two (2) times a day. Authorizing Provider: Bita Cardoso     Ordering User: Salvador Tim     Per verbal orders    Received request from mail order pharmacy.

## 2019-08-17 ENCOUNTER — APPOINTMENT (OUTPATIENT)
Dept: GENERAL RADIOLOGY | Age: 79
End: 2019-08-17
Attending: EMERGENCY MEDICINE
Payer: MEDICARE

## 2019-08-17 ENCOUNTER — HOSPITAL ENCOUNTER (EMERGENCY)
Age: 79
Discharge: HOME | End: 2019-08-17
Attending: EMERGENCY MEDICINE | Admitting: EMERGENCY MEDICINE
Payer: MEDICARE

## 2019-08-17 ENCOUNTER — APPOINTMENT (OUTPATIENT)
Dept: CT IMAGING | Age: 79
End: 2019-08-17
Attending: EMERGENCY MEDICINE
Payer: MEDICARE

## 2019-08-17 VITALS
BODY MASS INDEX: 21.03 KG/M2 | WEIGHT: 123.2 LBS | TEMPERATURE: 98.2 F | HEIGHT: 64 IN | SYSTOLIC BLOOD PRESSURE: 144 MMHG | HEART RATE: 77 BPM | OXYGEN SATURATION: 98 % | RESPIRATION RATE: 16 BRPM | DIASTOLIC BLOOD PRESSURE: 61 MMHG

## 2019-08-17 DIAGNOSIS — I10 ESSENTIAL HYPERTENSION: Primary | ICD-10-CM

## 2019-08-17 DIAGNOSIS — R42 DIZZINESS: ICD-10-CM

## 2019-08-17 DIAGNOSIS — R07.89 CHEST PRESSURE: ICD-10-CM

## 2019-08-17 LAB
ALBUMIN SERPL-MCNC: 4.4 G/DL (ref 3.5–5)
ALBUMIN/GLOB SERPL: 1.3 {RATIO} (ref 1.1–2.2)
ALP SERPL-CCNC: 139 U/L (ref 45–117)
ALT SERPL-CCNC: 42 U/L (ref 12–78)
ANION GAP SERPL CALC-SCNC: 10 MMOL/L (ref 5–15)
AST SERPL-CCNC: 30 U/L (ref 15–37)
BASOPHILS # BLD: 0 K/UL (ref 0–0.1)
BASOPHILS NFR BLD: 0 % (ref 0–1)
BILIRUB SERPL-MCNC: 0.6 MG/DL (ref 0.2–1)
BUN SERPL-MCNC: 31 MG/DL (ref 6–20)
BUN/CREAT SERPL: 27 (ref 12–20)
CALCIUM SERPL-MCNC: 10 MG/DL (ref 8.5–10.1)
CHLORIDE SERPL-SCNC: 107 MMOL/L (ref 97–108)
CO2 SERPL-SCNC: 22 MMOL/L (ref 21–32)
COMMENT, HOLDF: NORMAL
CREAT SERPL-MCNC: 1.14 MG/DL (ref 0.55–1.02)
DIFFERENTIAL METHOD BLD: NORMAL
EOSINOPHIL # BLD: 0.3 K/UL (ref 0–0.4)
EOSINOPHIL NFR BLD: 4 % (ref 0–7)
ERYTHROCYTE [DISTWIDTH] IN BLOOD BY AUTOMATED COUNT: 12.6 % (ref 11.5–14.5)
GLOBULIN SER CALC-MCNC: 3.4 G/DL (ref 2–4)
GLUCOSE SERPL-MCNC: 135 MG/DL (ref 65–100)
HCT VFR BLD AUTO: 43 % (ref 35–47)
HGB BLD-MCNC: 14 G/DL (ref 11.5–16)
IMM GRANULOCYTES # BLD AUTO: 0 K/UL (ref 0–0.04)
IMM GRANULOCYTES NFR BLD AUTO: 0 % (ref 0–0.5)
LYMPHOCYTES # BLD: 1.8 K/UL (ref 0.8–3.5)
LYMPHOCYTES NFR BLD: 25 % (ref 12–49)
MCH RBC QN AUTO: 29.3 PG (ref 26–34)
MCHC RBC AUTO-ENTMCNC: 32.6 G/DL (ref 30–36.5)
MCV RBC AUTO: 90 FL (ref 80–99)
MONOCYTES # BLD: 0.8 K/UL (ref 0–1)
MONOCYTES NFR BLD: 11 % (ref 5–13)
NEUTS SEG # BLD: 4.1 K/UL (ref 1.8–8)
NEUTS SEG NFR BLD: 60 % (ref 32–75)
NRBC # BLD: 0 K/UL (ref 0–0.01)
NRBC BLD-RTO: 0 PER 100 WBC
PLATELET # BLD AUTO: 227 K/UL (ref 150–400)
PMV BLD AUTO: 9.2 FL (ref 8.9–12.9)
POTASSIUM SERPL-SCNC: 4 MMOL/L (ref 3.5–5.1)
PROT SERPL-MCNC: 7.8 G/DL (ref 6.4–8.2)
RBC # BLD AUTO: 4.78 M/UL (ref 3.8–5.2)
SAMPLES BEING HELD,HOLD: NORMAL
SODIUM SERPL-SCNC: 139 MMOL/L (ref 136–145)
TROPONIN I SERPL-MCNC: <0.05 NG/ML
WBC # BLD AUTO: 7 K/UL (ref 3.6–11)

## 2019-08-17 PROCEDURE — 93005 ELECTROCARDIOGRAM TRACING: CPT

## 2019-08-17 PROCEDURE — 99284 EMERGENCY DEPT VISIT MOD MDM: CPT

## 2019-08-17 PROCEDURE — 70450 CT HEAD/BRAIN W/O DYE: CPT

## 2019-08-17 PROCEDURE — 85025 COMPLETE CBC W/AUTO DIFF WBC: CPT

## 2019-08-17 PROCEDURE — 80053 COMPREHEN METABOLIC PANEL: CPT

## 2019-08-17 PROCEDURE — 84484 ASSAY OF TROPONIN QUANT: CPT

## 2019-08-17 PROCEDURE — 71045 X-RAY EXAM CHEST 1 VIEW: CPT

## 2019-08-17 PROCEDURE — 36415 COLL VENOUS BLD VENIPUNCTURE: CPT

## 2019-08-18 LAB
ATRIAL RATE: 90 BPM
CALCULATED P AXIS, ECG09: 63 DEGREES
CALCULATED R AXIS, ECG10: 25 DEGREES
CALCULATED T AXIS, ECG11: 68 DEGREES
DIAGNOSIS, 93000: NORMAL
P-R INTERVAL, ECG05: 166 MS
Q-T INTERVAL, ECG07: 342 MS
QRS DURATION, ECG06: 82 MS
QTC CALCULATION (BEZET), ECG08: 418 MS
VENTRICULAR RATE, ECG03: 90 BPM

## 2019-08-18 NOTE — ED PROVIDER NOTES
77 y.o. female with past medical history significant for HTN, CAD, hyperlipidemia, and CKD who presents from home via EMS with chief complaint of dizziness. Pt presents in the ED stating that her blood pressure has increased. Pt states that whenever her BP rises, she get the same symptoms: dizziness, headache, nausea. Pt also states she developed a chest \"pressure. \" Pt specifically states that this pressure is not a pain. Pt reports a BP reading of 180/86 today after first developing the dizziness. Pt states the dizziness is the worst she has ever had. Pt denies SOB, nausea, and vomiting. There are no other acute medical concerns at this time. Social hx:   PCP: Naomi Garcia MD    Note written by marycarmen Abdalla, as dictated by Marbin Mott MD 10:14 PM      The history is provided by the patient. No  was used.         Past Medical History:   Diagnosis Date    CAD (coronary artery disease)     CKD (chronic kidney disease), stage III ([facility] [address] 9/26/2014    Followed by Dr. 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 Yanni Shaver History:   Problem Relation Age of Onset    Hypertension Mother     Diabetes Maternal Grandmother     Diabetes Paternal Grandfather        Social History     Socioeconomic History    Marital status:      Spouse name: Not on file    Number of children: Not on file    Years of education: Not on file    Highest education level: Not on file   Occupational History    Not on file   Social Needs    Financial resource strain: Not on file    Food insecurity: Worry: Not on file     Inability: Not on file    Transportation needs:     Medical: Not on file     Non-medical: Not on file   Tobacco Use    Smoking status: Never Smoker    Smokeless tobacco: Never Used   Substance and Sexual Activity    Alcohol use: No    Drug use: No    Sexual activity: Never   Lifestyle    Physical activity:     Days per week: Not on file     Minutes per session: Not on file    Stress: Not on file   Relationships    Social connections:     Talks on phone: Not on file     Gets together: Not on file     Attends Buddhism service: Not on file     Active member of club or organization: Not on file     Attends meetings of clubs or organizations: Not on file     Relationship status: Not on file    Intimate partner violence:     Fear of current or ex partner: Not on file     Emotionally abused: Not on file     Physically abused: Not on file     Forced sexual activity: Not on file   Other Topics Concern    Not on file   Social History Narrative    Not on file         ALLERGIES: Lisinopril    Review of Systems   Constitutional: Negative for activity change, appetite change and fatigue. HENT: Negative for ear pain, facial swelling, sore throat and trouble swallowing. Eyes: Negative for pain, discharge, redness and visual disturbance. Respiratory: Negative for chest tightness, shortness of breath and wheezing. Cardiovascular: Positive for chest pain ( pressure not pain) and leg swelling (chronic ankle). Negative for palpitations. Gastrointestinal: Negative for abdominal pain, blood in stool, nausea and vomiting. Genitourinary: Negative for difficulty urinating, flank pain and hematuria. Musculoskeletal: Negative for arthralgias, joint swelling, myalgias and neck pain. Skin: Negative for color change and rash. Neurological: Positive for dizziness and headaches. Negative for weakness and numbness. Hematological: Negative for adenopathy. Does not bruise/bleed easily. Psychiatric/Behavioral: Negative for behavioral problems, confusion and sleep disturbance. All other systems reviewed and are negative. Vitals:    08/17/19 2125   BP: 156/78   Pulse: 96   Resp: 16   SpO2: 99%            Physical Exam   Constitutional: She is oriented to person, place, and time. She appears well-developed and well-nourished. No distress. HENT:   Head: Normocephalic and atraumatic. Nose: Nose normal.   Mouth/Throat: Oropharynx is clear and moist.   Eyes: Pupils are equal, round, and reactive to light. Conjunctivae and EOM are normal. No scleral icterus. Neck: Normal range of motion. Neck supple. No JVD present. No tracheal deviation present. No thyromegaly present. No carotid bruits noted. Cardiovascular: Normal rate, regular rhythm, normal heart sounds and intact distal pulses. Exam reveals no gallop and no friction rub. No murmur heard. Occasional extrasystole. Pulmonary/Chest: Effort normal and breath sounds normal. No respiratory distress. She has no wheezes. She has no rales. She exhibits no tenderness. Abdominal: Soft. Bowel sounds are normal. She exhibits no distension and no mass. There is no tenderness. There is no rebound and no guarding. Musculoskeletal: Normal range of motion. She exhibits no edema or tenderness. Lymphadenopathy:     She has no cervical adenopathy. Neurological: She is alert and oriented to person, place, and time. She has normal reflexes. No cranial nerve deficit. Coordination normal.   Skin: Skin is warm and dry. No rash noted. No erythema. Psychiatric: She has a normal mood and affect. Her behavior is normal. Judgment and thought content normal.   Nursing note and vitals reviewed.      Note written by marycarmen Saini, as dictated by Osmani Toro MD 9:54 PM    MDM  Number of Diagnoses or Management Options  Chest pressure: new and requires workup  Dizziness: new and requires workup  Essential hypertension: established and worsening     Amount and/or Complexity of Data Reviewed  Clinical lab tests: ordered and reviewed  Tests in the radiology section of CPT®: ordered and reviewed  Decide to obtain previous medical records or to obtain history from someone other than the patient: yes  Review and summarize past medical records: yes  Discuss the patient with other providers: yes  Independent visualization of images, tracings, or specimens: yes    Risk of Complications, Morbidity, and/or Mortality  Presenting problems: high  Diagnostic procedures: high  Management options: high    Patient Progress  Patient progress: stable         Procedures    ED EKG interpretation:  Rhythm: sinus rhythm; and regular . Rate (approx.): 90; Axis: normal; ST/T wave: non-specific changes; No other acute changes. Note written by marycarmen Hanson, as dictated by Stephanie Bunch MD 10:35 PM    PROGRESS NOTE:  11:02 PM  Pt's lab work and imaging are all negative. Discussed with the patient. All findings are non acute. Will discharge home with symptomatic treatment and have the patient follow up with own MD if continued difficulty.

## 2019-08-18 NOTE — ED NOTES
Patient arrives ambulatory from home with CC of intermittent dizziness and chest tightness x3 days. Pt reports she took her BP at home and got 430 systolic and was concerned. Denies shortness of breath breath,  Denies weakness/ numbness. Denies fevers.

## 2019-08-18 NOTE — DISCHARGE INSTRUCTIONS
You  will need to continue your regular medications. Follow-up with Dr. Janey Blanca and Dr. Micah Mathur next week. If your symptoms worsen, return or call your doctor. Try to obtain blood pressures at the time you feel your pressure is elevated.

## 2019-08-21 ENCOUNTER — OFFICE VISIT (OUTPATIENT)
Dept: INTERNAL MEDICINE CLINIC | Facility: CLINIC | Age: 79
End: 2019-08-21

## 2019-08-21 VITALS
WEIGHT: 121.2 LBS | BODY MASS INDEX: 20.69 KG/M2 | OXYGEN SATURATION: 98 % | SYSTOLIC BLOOD PRESSURE: 156 MMHG | HEART RATE: 61 BPM | TEMPERATURE: 98.1 F | HEIGHT: 64 IN | RESPIRATION RATE: 14 BRPM | DIASTOLIC BLOOD PRESSURE: 60 MMHG

## 2019-08-21 DIAGNOSIS — R42 DIZZINESS: ICD-10-CM

## 2019-08-21 DIAGNOSIS — H91.93 BILATERAL HEARING LOSS, UNSPECIFIED HEARING LOSS TYPE: ICD-10-CM

## 2019-08-21 DIAGNOSIS — R73.01 IFG (IMPAIRED FASTING GLUCOSE): ICD-10-CM

## 2019-08-21 DIAGNOSIS — E78.2 MIXED HYPERLIPIDEMIA: ICD-10-CM

## 2019-08-21 DIAGNOSIS — I10 ESSENTIAL HYPERTENSION: ICD-10-CM

## 2019-08-21 DIAGNOSIS — Z00.00 MEDICARE ANNUAL WELLNESS VISIT, SUBSEQUENT: Primary | ICD-10-CM

## 2019-08-21 DIAGNOSIS — Z71.89 ADVANCED DIRECTIVES, COUNSELING/DISCUSSION: ICD-10-CM

## 2019-08-21 DIAGNOSIS — I25.10 CORONARY ARTERY DISEASE INVOLVING NATIVE CORONARY ARTERY OF NATIVE HEART WITHOUT ANGINA PECTORIS: ICD-10-CM

## 2019-08-21 NOTE — PATIENT INSTRUCTIONS
Medicare Wellness Visit, Female     The best way to live healthy is to have a lifestyle where you eat a well-balanced diet, exercise regularly, limit alcohol use, and quit all forms of tobacco/nicotine, if applicable. Regular preventive services are another way to keep healthy. Preventive services (vaccines, screening tests, monitoring & exams) can help personalize your care plan, which helps you manage your own care. Screening tests can find health problems at the earliest stages, when they are easiest to treat. [facility] follows the current, evidence-based guidelines published by the [facility] ([facility]) when recommending preventive services for our patients. Because we follow these guidelines, sometimes recommendations change over time as research supports it. (For example, mammograms used to be recommended annually. Even though Medicare will still pay for an annual mammogram, the newer guidelines recommend a mammogram every two years for women of average risk.)  Of course, you and your doctor may decide to screen more often for some diseases, based on your risk and your health status. Preventive services for you include:  - Medicare offers their members a free annual wellness visit, which is time for you and your primary care provider to discuss and plan for your preventive service needs. Take advantage of this benefit every year!  -All adults over the age of 72 should receive the recommended pneumonia vaccines. Current USPSTF guidelines recommend a series of two vaccines for the best pneumonia protection.   -All adults should have a flu vaccine yearly and a tetanus vaccine every 10 years. All adults age 61 and older should receive a shingles vaccine once in their lifetime.    -A bone mass density test is recommended when a woman turns 65 to screen for osteoporosis. This test is only recommended one time, as a screening.  Some providers will use this same test as a disease monitoring tool if you already have osteoporosis. -All adults age 38-68 who are overweight should have a diabetes screening test once every three years.   -Other screening tests and preventive services for persons with diabetes include: an eye exam to screen for diabetic retinopathy, a kidney function test, a foot exam, and stricter control over your cholesterol.   -Cardiovascular screening for adults with routine risk involves an electrocardiogram (ECG) at intervals determined by your doctor.   -Colorectal cancer screenings should be done for adults age 54-65 with no increased risk factors for colorectal cancer. There are a number of acceptable methods of screening for this type of cancer. Each test has its own benefits and drawbacks. Discuss with your doctor what is most appropriate for you during your annual wellness visit. The different tests include: colonoscopy (considered the best screening method), a fecal occult blood test, a fecal DNA test, and sigmoidoscopy. -Breast cancer screenings are recommended every other year for women of normal risk, age 54-69.  -Cervical cancer screenings for women over age 72 are only recommended with certain risk factors.   -All adults born between [facility] should be screened once for Hepatitis C.      Here is a list of your current Health Maintenance items (your personalized list of preventive services) with a due date:  Health Maintenance Due   Topic Date Due    Pneumococcal Vaccine (1 of 2 - PCV13) 12/21/2004    Annual Well Visit  08/11/2019

## 2019-08-21 NOTE — PROGRESS NOTES
Esequiel Gonsalez  Identified pt with two pt identifiers(name and ). Chief Complaint   Patient presents with    Blood Pressure Check     Room 2B//     Cholesterol Problem    Blood sugar problem       1. Have you been to the ER, urgent care clinic since your last visit? Hospitalized since your last visit? NO    2. Have you seen or consulted any other health care providers outside of the [address] [place] [address] since your last visit? Include any pap smears or colon screening. NO      Provider notified of reason for visit, vitals and flowsheets obtained on patients. Patient received paperwork for advance directive during previous visit but has not completed at this time     Reviewed record In preparation for visit, huddled with provider and have obtained necessary documentation      Health Maintenance Due   Topic    Shingrix Vaccine Age 50> (1 of 2)    Pneumococcal 65+ years (1 of 2 - PCV13)    Influenza Age 5 to Adult     MEDICARE YEARLY EXAM        Wt Readings from Last 3 Encounters:   19 123 lb 3.2 oz (55.9 kg)   05/15/19 120 lb (54.4 kg)   19 120 lb 12.8 oz (54.8 kg)     Temp Readings from Last 3 Encounters:   19 98.2 °F (36.8 °C)   19 98.3 °F (36.8 °C) (Oral)   19 97.8 °F (36.6 °C) (Oral)     BP Readings from Last 3 Encounters:   19 144/61   05/15/19 140/68   19 140/58     Pulse Readings from Last 3 Encounters:   19 77   05/15/19 72   19 76     There were no vitals filed for this visit.       Learning Assessment:  :     Learning Assessment 2014   PRIMARY LEARNER Patient   HIGHEST LEVEL OF EDUCATION - PRIMARY LEARNER  DID NOT GRADUATE HIGH SCHOOL   BARRIERS PRIMARY LEARNER NONE   CO-LEARNER CAREGIVER No   PRIMARY LANGUAGE ENGLISH   LEARNER PREFERENCE PRIMARY DEMONSTRATION   ANSWERED BY patient   RELATIONSHIP SELF       Depression Screening:  :     3 most recent PHQ Screens 2019   Little interest or pleasure in doing things Not at all Feeling down, depressed, irritable, or hopeless Not at all   Total Score PHQ 2 0       Fall Risk Assessment:  :     Fall Risk Assessment, last 12 mths 8/21/2019   Able to walk? Yes   Fall in past 12 months? No   Fall with injury? -   Number of falls in past 12 months -   Fall Risk Score -       Abuse Screening:  :     Abuse Screening Questionnaire 8/21/2019 8/10/2018 3/6/2017 3/16/2016 6/13/2014   Do you ever feel afraid of your partner? N N N N N   Are you in a relationship with someone who physically or mentally threatens you? N N N N N   Is it safe for you to go home? Y Y Y Y Y       ADL Screening:  :     ADL Assessment 3/6/2017   Feeding yourself No Help Needed   Getting from bed to chair No Help Needed   Getting dressed No Help Needed   Bathing or showering No Help Needed   Walk across the room (includes cane/walker) No Help Needed   Using the telphone No Help Needed   Taking your medications No Help Needed   Preparing meals No Help Needed   Managing money (expenses/bills) No Help Needed   Moderately strenuous housework (laundry) No Help Needed   Shopping for personal items (toiletries/medicines) No Help Needed   Shopping for groceries No Help Needed   Driving No Help Needed   Climbing a flight of stairs No Help Needed   Getting to places beyond walking distances No Help Needed         Medication reconciliation up to date and corrected with patient at this time.

## 2019-08-21 NOTE — ACP (ADVANCE CARE PLANNING)
Advance Care Planning    Advance Care Planning (ACP) Provider Conversation Snapshot    Date of ACP Conversation: 08/21/19  Persons included in Conversation:  patient  Length of ACP Conversation in minutes:  <16 minutes (Non-Billable)    Authorized Decision Maker (if patient is incapable of making informed decisions): This person is:   son Michaelle Moyer and daughter Viridiana Herrmann            For Patients with Decision Making Capacity:   Values/Goals: Exploration of values, goals, and preferences if recovery is not expected, even with continued medical treatment in the event of:  Imminent death  Severe, permanent brain injury  \"In these circumstances, what matters most to you? \"  Care focused more on comfort or quality of life.     Conversation Outcomes / Follow-Up Plan:   Recommended completion of Advance Directive form after review of ACP materials and conversation with prospective healthcare agent

## 2019-08-22 LAB
ALBUMIN SERPL-MCNC: 4.6 G/DL (ref 3.5–4.8)
ALBUMIN/GLOB SERPL: 1.9 {RATIO} (ref 1.2–2.2)
ALP SERPL-CCNC: 99 IU/L (ref 39–117)
ALT SERPL-CCNC: 27 IU/L (ref 0–32)
AST SERPL-CCNC: 26 IU/L (ref 0–40)
BILIRUB SERPL-MCNC: 0.8 MG/DL (ref 0–1.2)
BUN SERPL-MCNC: 22 MG/DL (ref 8–27)
BUN/CREAT SERPL: 22 (ref 12–28)
CALCIUM SERPL-MCNC: 10 MG/DL (ref 8.7–10.3)
CHLORIDE SERPL-SCNC: 106 MMOL/L (ref 96–106)
CHOLEST SERPL-MCNC: 181 MG/DL (ref 100–199)
CO2 SERPL-SCNC: 24 MMOL/L (ref 20–29)
CREAT SERPL-MCNC: 1.01 MG/DL (ref 0.57–1)
EST. AVERAGE GLUCOSE BLD GHB EST-MCNC: 123 MG/DL
GLOBULIN SER CALC-MCNC: 2.4 G/DL (ref 1.5–4.5)
GLUCOSE SERPL-MCNC: 116 MG/DL (ref 65–99)
HBA1C MFR BLD: 5.9 % (ref 4.8–5.6)
HDLC SERPL-MCNC: 82 MG/DL
LDLC SERPL CALC-MCNC: 86 MG/DL (ref 0–99)
MAGNESIUM SERPL-MCNC: 2 MG/DL (ref 1.6–2.3)
POTASSIUM SERPL-SCNC: 5.2 MMOL/L (ref 3.5–5.2)
PROT SERPL-MCNC: 7 G/DL (ref 6–8.5)
SODIUM SERPL-SCNC: 145 MMOL/L (ref 134–144)
TRIGL SERPL-MCNC: 63 MG/DL (ref 0–149)
VLDLC SERPL CALC-MCNC: 13 MG/DL (ref 5–40)

## 2019-11-13 ENCOUNTER — OFFICE VISIT (OUTPATIENT)
Dept: CARDIOLOGY CLINIC | Age: 79
End: 2019-11-13

## 2019-11-13 VITALS
WEIGHT: 119 LBS | SYSTOLIC BLOOD PRESSURE: 144 MMHG | RESPIRATION RATE: 16 BRPM | HEIGHT: 64 IN | BODY MASS INDEX: 20.32 KG/M2 | DIASTOLIC BLOOD PRESSURE: 64 MMHG | HEART RATE: 49 BPM | OXYGEN SATURATION: 98 %

## 2019-11-13 DIAGNOSIS — I25.10 CORONARY ARTERY DISEASE INVOLVING NATIVE CORONARY ARTERY OF NATIVE HEART WITHOUT ANGINA PECTORIS: Primary | ICD-10-CM

## 2019-11-13 DIAGNOSIS — R73.01 IFG (IMPAIRED FASTING GLUCOSE): ICD-10-CM

## 2019-11-13 DIAGNOSIS — I10 ESSENTIAL HYPERTENSION: ICD-10-CM

## 2019-11-13 DIAGNOSIS — I34.0 NONRHEUMATIC MITRAL VALVE REGURGITATION: ICD-10-CM

## 2019-11-13 DIAGNOSIS — I49.1 PAC (PREMATURE ATRIAL CONTRACTION): ICD-10-CM

## 2019-11-13 DIAGNOSIS — E78.2 MIXED HYPERLIPIDEMIA: ICD-10-CM

## 2019-11-13 DIAGNOSIS — I65.23 BILATERAL CAROTID ARTERY STENOSIS: ICD-10-CM

## 2019-11-13 DIAGNOSIS — R00.1 BRADYCARDIA: ICD-10-CM

## 2019-11-13 DIAGNOSIS — I49.3 PVC (PREMATURE VENTRICULAR CONTRACTION): ICD-10-CM

## 2019-11-13 NOTE — PROGRESS NOTES
[facility]: Loan Jerry  [phone]    HPI: Delilah Villar, a 77y.o. year-old who presents for follow up regarding her CAD. She denies any palpitations   No chest pain or GUZMÁN  No PND  BP slightly elevated and she hasn't been checking it at home  Heart rate slightly low today but she denies any dizziness or syncope   Walks for exercise, having to care for  with memory issues  Has intermittent leg cramps  Has LE edema with amlodipine 10mg daily   No blood in the stool or in the urine    Old HDL labs:    HDL 75 Tg 78  High ApoB, LDLp SdLDL lpa >225!!!!!  Nl Lpla2, bnp 194, Normal aspirin works  Insulin normal, glucose 109, Homocysteine 19  Vit D 32  LFT/CMp normal    Overall cv issues are stable, no sx of cad progression, cont to workon bp control, etc. wnl    Assessment/Plan:  1. HTN - slightly elevated but she prefers to leave her medications as is today  -continue losartan, coreg, amlodipine. 2. CAD- stable, s/p TRANG to RCA and mid LAD 3/11, cath 8/12 with proximal RCA stent, no ischemia on stress test in 2018  -lifelong plavix and aspirin (her decision after discussion of risks and benefits)  -continue coreg and statin  -she will follow up here again in 6 months  3. Palpitations- better on coreg, still had v-bigemeny on last holter  4. Hyperlipidemia- LDL 86 in 8/19 on crestor 20mg daily and zetia  -declined PCSK9 inhibition in the past   5. Anxiety- better since she stopped checking her blood pressure at home  6. Reflux- on pepcid   7. Vit D deficiency 2000u/day  8. Impaired glucose tolerance- 5.9%, exercising regularly   9. CKD Stage 3-stable, last creatinine 1.0, seen by Dr. Ghazal Sharp. -hyperkalemia in the past improved with stopping spironolactone  -last K 5.2 in 8/19  10. Mild to moderate mitral regurgitation- by TTE 11/15, will plan to repeat TTE at her next visit  11.   PAD - bilateral carotid bruits, will plan to repeat carotid duplex to assess for stenosis at her next visit, continue ASA and statin      Stress nuc 4/18 wnl  Echo 11/15 EF 60%, mild TR, PAP 25, mild-mod MR  Carotid US: Mod LICA plaque <63%, no evidence of Subclavian Steal.  Stress nuc: 1/14 normal, EF 69%  Echo 9/12 EF 60, mod MR, mild TR  Cath 2012 RCA stent    Fhx no early cad  Soc no tob, no etoh    She  has a past medical history of CAD (coronary artery disease), CKD (chronic kidney disease), stage III ([address] (9/26/2014), HTN (hypertension) (10/1/2010), Hyperlipidemia, and Hypertension. Cardiovascular ROS: no chest pain or palpitations  Respiratory ROS: negative for dyspnea or cough  Neurological ROS: no TIA or stroke symptoms  All other systems negative except as above. PE  Vitals:    11/13/19 1044   BP: 144/64   Pulse: (!) 49   Resp: 16   SpO2: 98%   Weight: 119 lb (54 kg)   Height: 5' 4\" (1.626 m)      Body mass index is 20.43 kg/m².    General appearance - alert, well appearing, and in no distress  Mental status - affect appropriate to mood  Eyes - sclera anicteric, moist mucous membranes  Neck - supple, bilateral carotid bruits (right > left)   Lymphatics - not assessed  Chest - clear to auscultation, no wheezes, rales or rhonchi  Heart - bradycardic, regular rhythm, normal S1, S2, 1/6 JACOBO   Abdomen - soft, nontender, nondistended  Back exam - full range of motion  Neurological - cranial nerves II through XII grossly intact, no focal deficit  Musculoskeletal -normal strength  Extremities - peripheral pulses normal, trace LE edema   Skin - normal coloration  no rashes    Recent Labs:  Lab Results   Component Value Date/Time    Cholesterol, total 181 08/21/2019 10:28 AM    HDL Cholesterol 82 08/21/2019 10:28 AM    LDL, calculated 86 08/21/2019 10:28 AM    Triglyceride 63 08/21/2019 10:28 AM    CHOL/HDL Ratio 3.1 09/28/2011 05:00 AM       Lab Results   Component Value Date/Time    Creatinine 1.01 (H) 08/21/2019 10:28 AM       Lab Results   Component Value Date/Time    BUN 22 08/21/2019 10:28 AM       Lab Results   Component Value Date/Time    Potassium 5.2 08/21/2019 10:28 AM       Lab Results   Component Value Date/Time    Hemoglobin A1c 5.9 (H) 08/21/2019 10:28 AM       Lab Results   Component Value Date/Time    HGB 14.0 08/17/2019 09:22 PM       Lab Results   Component Value Date/Time    PLATELET 472 94/22/3857 09:22 PM         Reviewed:  Past Medical History:   Diagnosis Date    CAD (coronary artery disease)     CKD (chronic kidney disease), stage III [address] [facility] 9/26/2014    Followed by Dr. 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  famotidine (PEPCID) 20 mg tablet Take 1 Tab by mouth two (2) times a day.  losartan (COZAAR) 25 mg tablet Take 1 Tab by mouth daily.  rosuvastatin (CRESTOR) 20 mg tablet Take 1 Tab by mouth nightly.  clopidogrel (PLAVIX) 75 mg tab Take 1 Tab by mouth daily.  ezetimibe (ZETIA) 10 mg tablet Take 1 Tab by mouth daily.  carvedilol (COREG) 6.25 mg tablet Take 1 Tab by mouth two (2) times a day.  nicotinic acid (NIACIN) 500 mg tablet Take 500 mg by mouth Daily (before breakfast).  cholecalciferol, vitamin D3, (VITAMIN D3) 2,000 unit tab Take 1 Tab by mouth daily.  aspirin 81 mg tablet Take 81 mg by mouth daily. No current facility-administered medications for this visit.           Adriana Arizmendi MD  [facility] [place]  [address] [facility], [address] [place] [address] [place]  [phone]    HPI: Arsenio Bland, a 77y.o. year-old who presents for follow up regarding her CAD. Bp looks great today but mostly 130s at home, not eating well the last few days due to the sniffles. Walks but not as much in the weather. Continues to walk regularly, leg is better now. She thinks she is having trouble with her memory. Leg cramps are much better now. .     She continues to walk regularly without symptoms and feels well with it. Walks 30 min a day 5-6 days a week. No blood in the stool or in the urine. She is feeling tightness in the chest after working outside, walking long time, goes up into the chest. Lasts a while and goes away with rest. Given these new sx will get lexiscan nuc to evalaute for cad progression, She can not walk on the treadmill. Old HDL labs:    HDL 75 Tg 78  High ApoB, LDLp SdLDL lpa >225!!!!!  Nl Lpla2, bnp 194, Normal aspirin works  Insulin normal, glucose 109, Homocysteine 19  Vit D 32  LFT/CMp normal    Assessment/Plan:  1. HTN-at goal today on losartan, coreg, amlodipine. 2. CAD- stable, s/p TRANG RCA and midLAD 3/11   -Cath 8/12 with proximal RCA stent,  No chest pain now  -lifelong plavix and aspirin(her decision after discussion of risks and benefits)  3. Palpitations- better on coreg, still v-bigemeny at last holter  4. Hyperlipidemia- crestor 40, welchol, trilipix. - now   -no Zetia due to cost.   5. Anxiety- better since she stopped checking her bp. 6. Reflux- on Nexium, and stable  7. Vit D deficiency 2000u/day, continue, at goal now    8. Impaired glucose tolerance- a1C  Still good  9. CKD Stage 3-stable now, improved, seen by Dr. Ghazal Sharp. -hyperkalemia in the past improved with stopping spironolactone, 4.8 now, also cut losartan for same  10.  Mild to moderate mitral regurgitation- by TTE 11/15    Stress nuc 6/16 wnl  Echo 11/15 EF 60%, mild TR, PAP 25, mild-mod MR  Carotid US: Mod LICA plaque <42%, no evidence of Subclavian Steal.  Stress nuc: 1/14 normal, EF 69%  Echo 9/12 EF 60, mod MR, mild TR  Cath 2012 RCA stent  Fhx no early cad  Soc no tob, no etoh    She  has a past medical history of CAD (coronary artery disease), CKD (chronic kidney disease), stage III ([address] (9/26/2014), HTN (hypertension) (10/1/2010), Hyperlipidemia, and Hypertension. Cardiovascular ROS: positive for - chest pain  Respiratory ROS: negative for - cough, hemoptysis, pleuritic pain or shortness of breath  Neurological ROS: no TIA or stroke symptoms  All other systems negative except as above. PE  Vitals:    11/13/19 1044   BP: 144/64   Pulse: (!) 49   Resp: 16   SpO2: 98%   Weight: 119 lb (54 kg)   Height: 5' 4\" (1.626 m)      Body mass index is 20.43 kg/m².    General appearance - alert, well appearing, and in no distress  Mental status - affect appropriate to mood  Eyes - sclera anicteric, moist mucous membranes  Neck - supple, no significant adenopathy  Lymphatics - no  lymphadenopathy  Chest - clear to auscultation, no wheezes, rales or rhonchi  Heart - normal rate, regular rhythm, normal S1, S2, no murmurs, rubs, clicks or gallops  Abdomen - soft, nontender, nondistended, no masses or organomegaly  Back exam - full range of motion, no tenderness  Neurological - cranial nerves II through XII grossly intact, no focal deficit  Musculoskeletal - no muscular tenderness noted, normal strength  Extremities - peripheral pulses normal, no pedal edema  Skin - normal coloration  no rashes    Recent Labs:  Lab Results   Component Value Date/Time    Cholesterol, total 181 08/21/2019 10:28 AM    HDL Cholesterol 82 08/21/2019 10:28 AM    LDL, calculated 86 08/21/2019 10:28 AM    Triglyceride 63 08/21/2019 10:28 AM    CHOL/HDL Ratio 3.1 09/28/2011 05:00 AM       Lab Results   Component Value Date/Time    Creatinine 1.01 (H) 08/21/2019 10:28 AM       Lab Results   Component Value Date/Time    BUN 22 08/21/2019 10:28 AM       Lab Results   Component Value Date/Time    Potassium 5.2 08/21/2019 10:28 AM       Lab Results Component Value Date/Time    Hemoglobin A1c 5.9 (H) 08/21/2019 10:28 AM       Lab Results   Component Value Date/Time    HGB 14.0 08/17/2019 09:22 PM       Lab Results   Component Value Date/Time    PLATELET 280 26/18/9840 09:22 PM         Reviewed:  Past Medical History:   Diagnosis Date    CAD (coronary artery disease)     CKD (chronic kidney disease), stage III ([facility] [address] 9/26/2014    Followed by Dr. 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  famotidine (PEPCID) 20 mg tablet Take 1 Tab by mouth two (2) times a day.  losartan (COZAAR) 25 mg tablet Take 1 Tab by mouth daily.  rosuvastatin (CRESTOR) 20 mg tablet Take 1 Tab by mouth nightly.  clopidogrel (PLAVIX) 75 mg tab Take 1 Tab by mouth daily.  ezetimibe (ZETIA) 10 mg tablet Take 1 Tab by mouth daily.  carvedilol (COREG) 6.25 mg tablet Take 1 Tab by mouth two (2) times a day.  nicotinic acid (NIACIN) 500 mg tablet Take 500 mg by mouth Daily (before breakfast).  cholecalciferol, vitamin D3, (VITAMIN D3) 2,000 unit tab Take 1 Tab by mouth daily.  aspirin 81 mg tablet Take 81 mg by mouth daily. No current facility-administered medications for this visit.           Darcy Sweeney MD  [facility] heart and Vascular [place]  [address] [facility]  [address]

## 2019-11-13 NOTE — PROGRESS NOTES
Chief Complaint   Patient presents with    Hypertension    Coronary Artery Disease    Follow-up     1. Have you been to the ER, urgent care clinic since your last visit? Hospitalized since your last visit? Yes When: 8/17/19 Where: [facility] Reason for visit: Hypertension/chest pain    2. Have you seen or consulted any other health care providers outside of the [address] [place] [address] since your last visit? Include any pap smears or colon screening. No    3) Do you have an Advance Directive on file?  no

## 2019-11-13 NOTE — PATIENT INSTRUCTIONS
Please try drinking a little tonic water at bedtime or putting a bar of [place] in the sheets

## 2020-02-26 ENCOUNTER — OFFICE VISIT (OUTPATIENT)
Dept: INTERNAL MEDICINE CLINIC | Facility: CLINIC | Age: 80
End: 2020-02-26

## 2020-02-26 VITALS
DIASTOLIC BLOOD PRESSURE: 62 MMHG | BODY MASS INDEX: 20.32 KG/M2 | HEART RATE: 72 BPM | HEIGHT: 64 IN | SYSTOLIC BLOOD PRESSURE: 130 MMHG | OXYGEN SATURATION: 99 % | WEIGHT: 119 LBS | RESPIRATION RATE: 14 BRPM | TEMPERATURE: 98.2 F

## 2020-02-26 DIAGNOSIS — R73.01 IFG (IMPAIRED FASTING GLUCOSE): ICD-10-CM

## 2020-02-26 DIAGNOSIS — E78.2 MIXED HYPERLIPIDEMIA: ICD-10-CM

## 2020-02-26 DIAGNOSIS — K21.9 GASTROESOPHAGEAL REFLUX DISEASE, ESOPHAGITIS PRESENCE NOT SPECIFIED: ICD-10-CM

## 2020-02-26 DIAGNOSIS — I10 ESSENTIAL HYPERTENSION: Primary | ICD-10-CM

## 2020-02-26 RX ORDER — LANOLIN ALCOHOL/MO/W.PET/CERES
1000 CREAM (GRAM) TOPICAL DAILY
COMMUNITY
End: 2022-05-12

## 2020-02-26 NOTE — PROGRESS NOTES
Abhishek Mabel  Identified pt with two pt identifiers(name and ). Chief Complaint   Patient presents with    Blood Pressure Check     Room 2A//     Cholesterol Problem    GERD       1. Have you been to the ER, urgent care clinic since your last visit? Hospitalized since your last visit? NO    2. Have you seen or consulted any other health care providers outside of the [facility] since your last visit? Include any pap smears or colon screening. NO      Provider notified of reason for visit, vitals and flowsheets obtained on patients. Patient received paperwork for advance directive during previous visit but has not completed at this time     Reviewed record In preparation for visit, huddled with provider and have obtained necessary documentation      There are no preventive care reminders to display for this patient.     Wt Readings from Last 3 Encounters:   20 119 lb (54 kg)   19 119 lb (54 kg)   19 121 lb 3.2 oz (55 kg)     Temp Readings from Last 3 Encounters:   19 98.1 °F (36.7 °C) (Oral)   19 98.2 °F (36.8 °C)   19 98.3 °F (36.8 °C) (Oral)     BP Readings from Last 3 Encounters:   19 144/64   19 156/60   19 144/61     Pulse Readings from Last 3 Encounters:   19 (!) 49   19 61   19 77     Vitals:    20 0853   Weight: 119 lb (54 kg)   Height: 5' 4\" (1.626 m)   PainSc:   0 - No pain         Learning Assessment:  :     Learning Assessment 2014   PRIMARY LEARNER Patient   HIGHEST LEVEL OF EDUCATION - PRIMARY LEARNER  DID NOT GRADUATE HIGH SCHOOL   BARRIERS PRIMARY LEARNER NONE   CO-LEARNER CAREGIVER No   PRIMARY LANGUAGE ENGLISH   LEARNER PREFERENCE PRIMARY DEMONSTRATION   ANSWERED BY patient   RELATIONSHIP SELF       Depression Screening:  :     3 most recent PHQ Screens 2019   Little interest or pleasure in doing things Not at all   Feeling down, depressed, irritable, or hopeless Not at all   Total Score PHQ 2 0       Fall Risk Assessment:  :     Fall Risk Assessment, last 12 mths 11/13/2019   Able to walk? Yes   Fall in past 12 months? No   Fall with injury? -   Number of falls in past 12 months -   Fall Risk Score -       Abuse Screening:  :     Abuse Screening Questionnaire 8/21/2019 8/10/2018 3/6/2017 3/16/2016 6/13/2014   Do you ever feel afraid of your partner? N N N N N   Are you in a relationship with someone who physically or mentally threatens you? N N N N N   Is it safe for you to go home? Y Y Y Y Y       ADL Screening:  :     ADL Assessment 3/6/2017   Feeding yourself No Help Needed   Getting from bed to chair No Help Needed   Getting dressed No Help Needed   Bathing or showering No Help Needed   Walk across the room (includes cane/walker) No Help Needed   Using the telphone No Help Needed   Taking your medications No Help Needed   Preparing meals No Help Needed   Managing money (expenses/bills) No Help Needed   Moderately strenuous housework (laundry) No Help Needed   Shopping for personal items (toiletries/medicines) No Help Needed   Shopping for groceries No Help Needed   Driving No Help Needed   Climbing a flight of stairs No Help Needed   Getting to places beyond walking distances No Help Needed         Medication reconciliation up to date and corrected with patient at this time.

## 2020-02-26 NOTE — PROGRESS NOTES
HPI  Ms. Jamal Beckford is a [de-identified]y.o. year old female, she is seen today for follow up HTN, high cholesterol, GERD. Has been tired, has been spending more time with her  in rehab, son has been dropping her off in the morning at 9am and picking her up at 7pm.   GERD is stable - may be worse with certain foods. Doesn't check BP at home. No chest pain, sob, dizziness, lightheadedness. Chief Complaint   Patient presents with    Blood Pressure Check     Room 2A//  in the hospital due to a fall last week- pt is bruised on the L side from where he fell against you     Cholesterol Problem    GERD        Prior to Admission medications    Medication Sig Start Date End Date Taking? Authorizing Provider   cyanocobalamin 1,000 mcg tablet Take 1,000 mcg by mouth daily. Yes Provider, Historical   amLODIPine (NORVASC) 5 mg tablet Take 2 Tabs by mouth daily. 8/9/19  Yes Christina Escobar MD   famotidine (PEPCID) 20 mg tablet Take 1 Tab by mouth two (2) times a day. 8/9/19  Yes Christina Escobar MD   losartan (COZAAR) 25 mg tablet Take 1 Tab by mouth daily. 8/9/19  Yes Christina Escobar MD   rosuvastatin (CRESTOR) 20 mg tablet Take 1 Tab by mouth nightly. 8/9/19  Yes Christina Escobar MD   clopidogrel (PLAVIX) 75 mg tab Take 1 Tab by mouth daily. 8/9/19  Yes Bisi Barragan MD   ezetimibe (ZETIA) 10 mg tablet Take 1 Tab by mouth daily. 8/9/19  Yes Bisi Barragan MD   carvedilol (COREG) 6.25 mg tablet Take 1 Tab by mouth two (2) times a day. 8/9/19  Yes Bisi Barragan MD   nicotinic acid (NIACIN) 500 mg tablet Take 500 mg by mouth Daily (before breakfast). Yes Provider, Historical   cholecalciferol, vitamin D3, (VITAMIN D3) 2,000 unit tab Take 1 Tab by mouth daily. Yes Provider, Historical   aspirin 81 mg tablet Take 81 mg by mouth daily.    Yes Provider, Historical         Allergies   Allergen Reactions    Lisinopril Other (comments)     High K         REVIEW OF SYSTEMS:  Per HPI    PHYSICAL EXAM:  Visit Vitals  /62   Pulse 72   Temp 98.2 °F (36.8 °C) (Oral)   Resp 14   Ht 5' 4\" (1.626 m)   Wt 119 lb (54 kg)   SpO2 99%   BMI 20.43 kg/m²     Constitutional: Appears well-developed and well-nourished. No distress. HENT:   Head: Normocephalic and atraumatic. Eyes: No scleral icterus. Cardiovascular: Normal S1/S2, regular rhythm. No murmurs, rubs, or gallops. Pulmonary/Chest: Effort normal and breath sounds normal. No respiratory distress. No wheezes, rhonchi, or rales. Abdomen: Soft, NT/ND, +BS, no rebound or guarding, no masses, no HSM appreciated. Ext: No edema. Neurological: Alert. Psychiatric: Normal mood and affect. Behavior is normal.     Lab Results   Component Value Date/Time    Sodium 145 (H) 08/21/2019 10:28 AM    Potassium 5.2 08/21/2019 10:28 AM    Chloride 106 08/21/2019 10:28 AM    CO2 24 08/21/2019 10:28 AM    Anion gap 10 08/17/2019 09:22 PM    Glucose 116 (H) 08/21/2019 10:28 AM    BUN 22 08/21/2019 10:28 AM    Creatinine 1.01 (H) 08/21/2019 10:28 AM    BUN/Creatinine ratio 22 08/21/2019 10:28 AM    GFR est AA 61 08/21/2019 10:28 AM    GFR est non-AA 53 (L) 08/21/2019 10:28 AM    Calcium 10.0 08/21/2019 10:28 AM    Bilirubin, total 0.8 08/21/2019 10:28 AM    AST (SGOT) 26 08/21/2019 10:28 AM    Alk.  phosphatase 99 08/21/2019 10:28 AM    Protein, total 7.0 08/21/2019 10:28 AM    Albumin 4.6 08/21/2019 10:28 AM    Globulin 3.4 08/17/2019 09:22 PM    A-G Ratio 1.9 08/21/2019 10:28 AM    ALT (SGPT) 27 08/21/2019 10:28 AM     Lab Results   Component Value Date/Time    Hemoglobin A1c 5.9 (H) 08/21/2019 10:28 AM    Hemoglobin A1c 6.0 (H) 02/12/2019 10:36 AM    Hemoglobin A1c 5.8 (H) 08/10/2018 12:12 PM      Lab Results   Component Value Date/Time    Cholesterol, total 181 08/21/2019 10:28 AM    HDL Cholesterol 82 08/21/2019 10:28 AM    LDL, calculated 86 08/21/2019 10:28 AM    VLDL, calculated 13 08/21/2019 10:28 AM    Triglyceride 63 08/21/2019 10:28 AM CHOL/HDL Ratio 3.1 09/28/2011 05:00 AM          ASSESSMENT/PLAN  Diagnoses and all orders for this visit:    1. Essential hypertension  Controlled on current regimen, continue   2. IFG (impaired fasting glucose)  -     HEMOGLOBIN A1C WITH EAG; Future    3. Mixed hyperlipidemia  -     METABOLIC PANEL, COMPREHENSIVE; Future  -     LIPID PANEL; Future  Will return for labs  4. Gastroesophageal reflux disease, esophagitis presence not specified  Controlled on current regimen, continue         There are no preventive care reminders to display for this patient. Follow-up and Dispositions    · Return in about 6 months (around 8/26/2020) for bp, chol, AWV. Reviewed plan of care. Patient has provided input and agrees with goals. The nurse provided the patient and/or family with advanced directive information if needed and encouraged the patient to provide a copy to the office when available.

## 2020-05-07 ENCOUNTER — TELEPHONE (OUTPATIENT)
Dept: CARDIOLOGY CLINIC | Age: 80
End: 2020-05-07

## 2020-05-08 NOTE — TELEPHONE ENCOUNTER
Returned patient's call, 2 pt identifiers used    Patient has kept her testing appointments.  But her appt with Dr. Mann Rodríguez does need to be rescheduled as she will be covering the hospital. Appointment rescheduled:    Future Appointments   Date Time Provider Malachi Millard   5/19/2020  9:00 AM VASCULAR, [address] [facility]   5/19/2020 10:00 AM ECHO, [address] [facility]   6/2/2020  9:40 AM Divine Harris  [address]    8/28/2020 10:00 AM Esteban Reece MD [facility]

## 2020-05-19 LAB
LEFT CCA DIST DIAS: 14.7 CENTIMETER/SECOND
LEFT CCA DIST SYS: 74.8 CENTIMETER/SECOND
LEFT CCA PROX DIAS: 11.7 CENTIMETER/SECOND
LEFT CCA PROX SYS: 68.9 CENTIMETER/SECOND
LEFT ECA DIAS: 1.51 CENTIMETER/SECOND
LEFT ECA SYS: 93.5 CENTIMETER/SECOND
LEFT ICA DIST DIAS: 18.3 CENTIMETER/SECOND
LEFT ICA DIST SYS: 72.8 CENTIMETER/SECOND
LEFT ICA MID DIAS: 29.4 CENTIMETER/SECOND
LEFT ICA MID SYS: 138.6 CENTIMETER/SECOND
LEFT ICA PROX DIAS: 40.1 CENTIMETER/SECOND
LEFT ICA PROX SYS: 157.6 CENTIMETER/SECOND
LEFT ICA/CCA SYS: 2.11
LEFT VERTEBRAL DIAS: 10.84 CENTIMETER/SECOND
LEFT VERTEBRAL SYS: 45.2 CENTIMETER/SECOND
RIGHT CCA DIST DIAS: 12 CENTIMETER/SECOND
RIGHT CCA DIST SYS: 70.5 CENTIMETER/SECOND
RIGHT CCA PROX DIAS: 10 CENTIMETER/SECOND
RIGHT CCA PROX SYS: 75.8 CENTIMETER/SECOND
RIGHT ECA DIAS: 9.94 CENTIMETER/SECOND
RIGHT ECA SYS: 79.9 CENTIMETER/SECOND
RIGHT ICA DIST DIAS: 14 CENTIMETER/SECOND
RIGHT ICA DIST SYS: 79.7 CENTIMETER/SECOND
RIGHT ICA MID DIAS: 16.3 CENTIMETER/SECOND
RIGHT ICA MID SYS: 82.4 CENTIMETER/SECOND
RIGHT ICA PROX DIAS: 17.2 CENTIMETER/SECOND
RIGHT ICA PROX SYS: 102 CENTIMETER/SECOND
RIGHT ICA/CCA SYS: 1.3
RIGHT VERTEBRAL DIAS: 11.22 CENTIMETER/SECOND
RIGHT VERTEBRAL SYS: 38.4 CENTIMETER/SECOND

## 2020-05-19 NOTE — PROGRESS NOTES
Progression of carotid disease in left ICA, <50% right ICA stenosis, no intervention needed at this time, continue statin, will discuss further with Dr. India Cline at her follow up visit 6/2  Future Appointments  6/2/2020   9:40 AM    Stephen Keane MD  [place] [address]  8/28/2020  10:00 AM   Soraida Milner MD     [address] [place] [address]

## 2020-05-22 ENCOUNTER — TELEPHONE (OUTPATIENT)
Dept: CARDIOLOGY CLINIC | Age: 80
End: 2020-05-22

## 2020-05-22 NOTE — TELEPHONE ENCOUNTER
----- Message from Kisha Hayward NP sent at 5/22/2020 11:04 AM EDT -----  Echo ok - EF normal 55-60%, only mild AV sclerosis, mild MR, mild PI, mild TR  Will discuss further at upcoming appt  Future Appointments  6/2/2020   9:40 AM    Marycarmen Carias MD

## 2020-05-22 NOTE — TELEPHONE ENCOUNTER
Spoke with patient, identifiers x2. Reviewed echo results with patient. She verbalized understanding and denied any questions.

## 2020-05-27 ENCOUNTER — TELEPHONE (OUTPATIENT)
Dept: CARDIOLOGY CLINIC | Age: 80
End: 2020-05-27

## 2020-05-27 NOTE — TELEPHONE ENCOUNTER
----- Message from Bonnie Meier NP sent at 5/19/2020  3:55 PM EDT -----  Progression of carotid disease in left ICA, <50% right ICA stenosis, no intervention needed at this time, continue statin, will discuss further with Dr. Fab Sánchez at her follow up visit 6/2  Future Appointments  6/2/2020   9:40 AM    [place] [address]  8/28/2020  10:00 AM   Robert Hernandez MD     [address] [place] [address]    Above carotid results given to patient. She will discuss further during upcoming visit.   2 pt identifiers used

## 2020-05-29 DIAGNOSIS — K21.9 GASTROESOPHAGEAL REFLUX DISEASE WITHOUT ESOPHAGITIS: ICD-10-CM

## 2020-05-30 RX ORDER — FAMOTIDINE 20 MG/1
TABLET, FILM COATED ORAL
Qty: 180 TAB | Refills: 3 | Status: SHIPPED | OUTPATIENT
Start: 2020-05-30 | End: 2021-04-22

## 2020-06-02 ENCOUNTER — VIRTUAL VISIT (OUTPATIENT)
Dept: CARDIOLOGY CLINIC | Age: 80
End: 2020-06-02

## 2020-06-02 VITALS
HEART RATE: 68 BPM | BODY MASS INDEX: 20.34 KG/M2 | WEIGHT: 118.5 LBS | DIASTOLIC BLOOD PRESSURE: 70 MMHG | SYSTOLIC BLOOD PRESSURE: 129 MMHG

## 2020-06-02 DIAGNOSIS — I10 ESSENTIAL HYPERTENSION: ICD-10-CM

## 2020-06-02 DIAGNOSIS — I25.10 CORONARY ARTERY DISEASE INVOLVING NATIVE CORONARY ARTERY OF NATIVE HEART WITHOUT ANGINA PECTORIS: ICD-10-CM

## 2020-06-02 DIAGNOSIS — E78.2 MIXED HYPERLIPIDEMIA: ICD-10-CM

## 2020-06-02 DIAGNOSIS — I49.3 PVC (PREMATURE VENTRICULAR CONTRACTION): ICD-10-CM

## 2020-06-02 DIAGNOSIS — I65.23 BILATERAL CAROTID ARTERY STENOSIS: ICD-10-CM

## 2020-06-02 DIAGNOSIS — R00.1 BRADYCARDIA: ICD-10-CM

## 2020-06-02 DIAGNOSIS — I34.0 NONRHEUMATIC MITRAL VALVE REGURGITATION: Primary | ICD-10-CM

## 2020-06-02 DIAGNOSIS — R73.01 IFG (IMPAIRED FASTING GLUCOSE): ICD-10-CM

## 2020-06-02 DIAGNOSIS — I49.1 PAC (PREMATURE ATRIAL CONTRACTION): ICD-10-CM

## 2020-06-02 RX ORDER — CARVEDILOL 3.12 MG/1
3.12 TABLET ORAL 2 TIMES DAILY
Qty: 180 TAB | Refills: 3 | Status: SHIPPED | OUTPATIENT
Start: 2020-06-02 | End: 2021-05-25

## 2020-06-02 NOTE — PATIENT INSTRUCTIONS
MD Alphonse Guadarrama  
  
   
  
1 mo fu vv Future Appointments Date Time Provider Malachi [place] 7/6/2020  3:00 PM Nayana Reid  [address] 8/28/2020 10:00 AM Cindy Brennan  [facility]

## 2020-06-25 DIAGNOSIS — E78.2 MIXED HYPERLIPIDEMIA: ICD-10-CM

## 2020-06-25 DIAGNOSIS — I10 ESSENTIAL HYPERTENSION: Primary | ICD-10-CM

## 2020-06-25 RX ORDER — LOSARTAN POTASSIUM 25 MG/1
TABLET ORAL
Qty: 90 TAB | Refills: 3 | Status: SHIPPED | OUTPATIENT
Start: 2020-06-25 | End: 2021-06-03

## 2020-06-25 RX ORDER — CLOPIDOGREL BISULFATE 75 MG/1
TABLET ORAL
Qty: 90 TAB | Refills: 3 | Status: SHIPPED | OUTPATIENT
Start: 2020-06-25 | End: 2021-06-04

## 2020-06-25 RX ORDER — ROSUVASTATIN CALCIUM 20 MG/1
TABLET, COATED ORAL
Qty: 90 TAB | Refills: 3 | Status: SHIPPED | OUTPATIENT
Start: 2020-06-25 | End: 2021-06-04

## 2020-06-25 RX ORDER — AMLODIPINE BESYLATE 5 MG/1
TABLET ORAL
Qty: 180 TAB | Refills: 3 | Status: SHIPPED | OUTPATIENT
Start: 2020-06-25 | End: 2021-06-03

## 2020-07-06 ENCOUNTER — VIRTUAL VISIT (OUTPATIENT)
Dept: CARDIOLOGY CLINIC | Age: 80
End: 2020-07-06

## 2020-07-06 DIAGNOSIS — I10 ESSENTIAL HYPERTENSION: ICD-10-CM

## 2020-07-06 DIAGNOSIS — I49.3 PVC (PREMATURE VENTRICULAR CONTRACTION): ICD-10-CM

## 2020-07-06 DIAGNOSIS — I49.1 PAC (PREMATURE ATRIAL CONTRACTION): ICD-10-CM

## 2020-07-06 DIAGNOSIS — I65.23 BILATERAL CAROTID ARTERY STENOSIS: ICD-10-CM

## 2020-07-06 DIAGNOSIS — R00.2 HEART PALPITATIONS: ICD-10-CM

## 2020-07-06 DIAGNOSIS — E78.2 MIXED HYPERLIPIDEMIA: ICD-10-CM

## 2020-07-06 DIAGNOSIS — I34.0 NONRHEUMATIC MITRAL VALVE REGURGITATION: Primary | ICD-10-CM

## 2020-07-06 DIAGNOSIS — R00.1 BRADYCARDIA: ICD-10-CM

## 2020-07-06 DIAGNOSIS — I25.10 CORONARY ARTERY DISEASE INVOLVING NATIVE CORONARY ARTERY OF NATIVE HEART WITHOUT ANGINA PECTORIS: ICD-10-CM

## 2020-07-06 DIAGNOSIS — R73.01 IFG (IMPAIRED FASTING GLUCOSE): ICD-10-CM

## 2020-07-06 NOTE — PROGRESS NOTES
Jelena Rodriguez VIRTUAL VISIT DOCUMENTATION     Pursuant to the emergency declaration under the [facility] waiver authority and the fund[employer]r and [employer] Act, this Virtual  Visit was conducted, with patient's consent, to reduce the patient's risk of exposure to COVID-19 and provide continuity of care for an established patient. Services were provided through a synchronous discussion virtually to substitute for in-person clinic visit. CHIEF COMPLAINT      Gaby Perla is a [de-identified] y.o. female who was seen by synchronous (real-time) audio-video technology on 7/6/2020. Patient is being seen today for HTN, GERD, XOL. She is doing ok, has some stressful days and sometimes has weak spells. They are a lot better since she cut down on the carvedilol to 1/2 tablet twice a day. Also has some swelling in the feet, toes get numb at times. Suspect it is related. Could be early SS- reduced  coreg and she does feel better. Wants to take biotin for nail and I think that is fine    Having some palpitations in the middle of the night. Lots of caregiver strain with her . I think that is the major source of her fatigue    HR racing at night and skipping when she gets back to bed. Sometimes she feels like it is pumping hard at night. HR resting at 60  70    Reviewed her echo and carotid artery studies. She wonders about labs. ASSESSMENT      1. HTN - great today will reduce coreg as above. .   2. CAD- stable, s/p TRANG to RCA and mid LAD 3/11, cath 8/12 with proximal RCA stent, no ischemia on stress test in 2018  -lifelong plavix and aspirin (her decision after discussion of risks and benefits)  -continue coreg and statin  3. Palpitations- better on coreg, still had v-bigemeny on last holter  4. Hyperlipidemia- LDL 86 in 8/19 on crestor 20mg daily and zetia  -declined PCSK9 inhibition in the past   5.  Anxiety- better since she stopped checking her blood pressure at home  6. Reflux- on pepcid   7. Vit D deficiency 2000u/day  8. Impaired glucose tolerance- 5.9%, exercising regularly   9. CKD Stage 3-stable, last creatinine 1.0, seen by Dr. Karla Green. -hyperkalemia in the past improved with stopping spironolactone  -last K 5.2 in 8/19  10. Mild to moderate mitral regurgitation   11. PAD - bilateral carotid bruits, mild Bety dn moderate LICA  12. Possible SS- was above watch for now and will get loop if sx progress. 13. Edema- diuretic versus reducing amlodipine- fu one month     Stress nuc 4/18 wnl  Echo 11/15 EF 60%, mild TR, PAP 25, mild-mod MR  Carotid US: Mod LICA plaque <47%, no evidence of Subclavian Steal.  Stress nuc: 1/14 normal, EF 69%  Echo 9/12 EF 60, mod MR, mild TR  Cath 2012 RCA stent     Fhx no early cad  Soc no tob, no etoh  We discussed the expected course, resolution and complications of the diagnosis(es) in detail. Medication risks, benefits, costs, interactions, and alternatives were discussed as indicated. I advised her to contact the office if her condition worsens, changes or fails to improve as anticipated.  She expressed understanding with the diagnosis(es) and plan    HISTORY OF PRESENTING ILLNESS      Laurie Kehr is a [de-identified] y.o. female        ACTIVE PROBLEM LIST     Patient Active Problem List    Diagnosis Date Noted    Coronary artery disease involving native coronary artery of native heart without angina pectoris 04/18/2018    Heart palpitations 10/18/2017    Hyperlipidemia 10/18/2017    IFG (impaired fasting glucose) 06/13/2014    Carotid artery stenosis 03/13/2014    PVC (premature ventricular contraction) 11/07/2013    GERD (gastroesophageal reflux disease) 02/13/2013    Esophageal reflux 01/23/2012    Other and unspecified hyperlipidemia 01/23/2012    PAC (premature atrial contraction) 09/27/2011    CAD (coronary artery disease) 04/04/2011    Allergic rhinitis 11/03/2010    HTN (hypertension) 10/01/2010           PAST MEDICAL HISTORY     Past Medical History:   Diagnosis Date    CAD (coronary artery disease)     CKD (chronic kidney disease), stage III ([facility] [address] 9/26/2014    Followed by Dr. 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 Ruff Service Lisinopril Other (comments)     High K          FAMILY HISTORY     Family History   Problem Relation Age of Onset    Hypertension Mother     Diabetes Maternal Grandmother     Diabetes Paternal Grandfather     negative for cardiac disease       SOCIAL HISTORY     Social History     Socioeconomic History    Marital status:      Spouse name: Not on file    Number of children: Not on file    Years of education: Not on file    Highest education level: Not on file   Tobacco Use    Smoking status: Never Smoker    Smokeless tobacco: Never Used   Substance and Sexual Activity    Alcohol use: No    Drug use: No    Sexual activity: Never         MEDICATIONS     Current Outpatient Medications   Medication Sig    rosuvastatin (CRESTOR) 20 mg tablet TAKE 1 TABLET EVERY NIGHT    losartan (COZAAR) 25 mg tablet TAKE 1 TABLET EVERY DAY    amLODIPine (NORVASC) 5 mg tablet TAKE 2 TABLETS EVERY DAY    clopidogreL (PLAVIX) 75 mg tab TAKE 1 TABLET EVERY DAY    carvediloL (COREG) 3.125 mg tablet Take 1 Tab by mouth two (2) times a day.  famotidine (PEPCID) 20 mg tablet TAKE 1 TABLET TWICE DAILY    cyanocobalamin 1,000 mcg tablet Take 1,000 mcg by mouth daily.     ezetimibe (ZETIA) 10 mg tablet Take 1 Tab by mouth daily.  nicotinic acid (NIACIN) 500 mg tablet Take 500 mg by mouth Daily (before breakfast).  cholecalciferol, vitamin D3, (VITAMIN D3) 2,000 unit tab Take 1 Tab by mouth daily.  aspirin 81 mg tablet Take 81 mg by mouth daily. No current facility-administered medications for this visit. I have reviewed the nurses notes, vitals, problem list, allergy list, medical history, family, social history and medications. REVIEW OF SYMPTOMS     Constitutional: Negative for fever, chills, malaise/fatigue and diaphoresis. Respiratory: Negative for cough, hemoptysis, sputum production, shortness of breath and wheezing. Cardiovascular: Negative for chest pain, palpitations, orthopnea, claudication, leg swelling and PND. Gastrointestinal: Negative for heartburn, nausea, vomiting, blood in stool and melena. Genitourinary: Negative for dysuria and flank pain. Musculoskeletal: Negative for joint pain and back pain. Skin: Negative for rash. Neurological: Negative for focal weakness, seizures, loss of consciousness, weakness and headaches. Endo/Heme/Allergies: Negative for abnormal bleeding. Psychiatric/Behavioral: Negative for memory loss. PHYSICAL EXAMINATION      Due to this being a TeleHealth evaluation, many elements of the physical examination are unable to be assessed. General:  in no acute distress, cooperative and alert  Respiratory: No audible wheezing, no signs of respiratory distress  Neuro: A&Ox3, speech clear,  answering questions appropriately  Breathing unlabored  Mentation logical, thoughts ordered       DIAGNOSTIC DATA      No specialty comments available.        LABORATORY DATA      Lab Results   Component Value Date/Time    WBC 7.0 08/17/2019 09:22 PM    HGB 14.0 08/17/2019 09:22 PM    HCT 43.0 08/17/2019 09:22 PM    PLATELET 507 65/07/0378 09:22 PM    MCV 90.0 08/17/2019 09:22 PM      Lab Results   Component Value Date/Time    Sodium 145 (H) 08/21/2019 10:28 AM    Potassium 5.2 08/21/2019 10:28 AM    Chloride 106 08/21/2019 10:28 AM    CO2 24 08/21/2019 10:28 AM    Anion gap 10 08/17/2019 09:22 PM    Glucose 116 (H) 08/21/2019 10:28 AM    BUN 22 08/21/2019 10:28 AM    Creatinine 1.01 (H) 08/21/2019 10:28 AM    BUN/Creatinine ratio 22 08/21/2019 10:28 AM    GFR est AA 61 08/21/2019 10:28 AM    GFR est non-AA 53 (L) 08/21/2019 10:28 AM    Calcium 10.0 08/21/2019 10:28 AM    Bilirubin, total 0.8 08/21/2019 10:28 AM    Alk. phosphatase 99 08/21/2019 10:28 AM    Protein, total 7.0 08/21/2019 10:28 AM    Albumin 4.6 08/21/2019 10:28 AM    Globulin 3.4 08/17/2019 09:22 PM    A-G Ratio 1.9 08/21/2019 10:28 AM    ALT (SGPT) 27 08/21/2019 10:28 AM             FOLLOW-UP            Patient was made aware and verbalized understanding that an appointment will be scheduled for them for a virtual visit and/or office visit within the above time frame. Patient understanding his/her responsibility to call and change time/date if he/she so chooses. Thank you, Devon Leon MD for allowing me to participate in the care of Gaby Perla. Please do not hesitate to contact me for further questions/concerns. Greater than 20 minutes was spent in direct video patient care, planning and chart review. This visit was conducted using [employer] telemedicine services.        MD Shane Granger [phone]        [facility], [address] [place] [address]  [address]        [phone] / [phone] Fax       [facility], [address] [place] [address]  [place]anish Gómez  [phone] / [phone] Fax

## 2020-07-09 NOTE — PATIENT INSTRUCTIONS
MD Margarito Joyner   
  
  In person late nov to dec Future Appointments Date Time Provider [facility] [place] 8/28/2020 10:00 AM Beatriz King MD [facility]  
12/1/2020  2:20 PM Yamel Garcia  [address]

## 2020-07-18 RX ORDER — EZETIMIBE 10 MG/1
TABLET ORAL
Qty: 90 TAB | Refills: 3 | Status: SHIPPED | OUTPATIENT
Start: 2020-07-18 | End: 2021-08-09 | Stop reason: SDUPTHER

## 2020-08-01 LAB
ALBUMIN SERPL-MCNC: 4.6 G/DL (ref 3.7–4.7)
ALBUMIN/GLOB SERPL: 1.9 {RATIO} (ref 1.2–2.2)
ALP SERPL-CCNC: 87 IU/L (ref 39–117)
ALT SERPL-CCNC: 17 IU/L (ref 0–32)
AST SERPL-CCNC: 20 IU/L (ref 0–40)
BILIRUB SERPL-MCNC: 0.6 MG/DL (ref 0–1.2)
BUN SERPL-MCNC: 26 MG/DL (ref 8–27)
BUN/CREAT SERPL: 30 (ref 12–28)
CALCIUM SERPL-MCNC: 10 MG/DL (ref 8.7–10.3)
CHLORIDE SERPL-SCNC: 105 MMOL/L (ref 96–106)
CHOLEST SERPL-MCNC: 196 MG/DL (ref 100–199)
CO2 SERPL-SCNC: 21 MMOL/L (ref 20–29)
CREAT SERPL-MCNC: 0.86 MG/DL (ref 0.57–1)
EST. AVERAGE GLUCOSE BLD GHB EST-MCNC: 123 MG/DL
GLOBULIN SER CALC-MCNC: 2.4 G/DL (ref 1.5–4.5)
GLUCOSE SERPL-MCNC: 110 MG/DL (ref 65–99)
HBA1C MFR BLD: 5.9 % (ref 4.8–5.6)
HDLC SERPL-MCNC: 92 MG/DL
LDLC SERPL CALC-MCNC: 91 MG/DL (ref 0–99)
POTASSIUM SERPL-SCNC: 4.9 MMOL/L (ref 3.5–5.2)
PROT SERPL-MCNC: 7 G/DL (ref 6–8.5)
SODIUM SERPL-SCNC: 143 MMOL/L (ref 134–144)
TRIGL SERPL-MCNC: 63 MG/DL (ref 0–149)
VLDLC SERPL CALC-MCNC: 13 MG/DL (ref 5–40)

## 2020-08-28 ENCOUNTER — VIRTUAL VISIT (OUTPATIENT)
Dept: INTERNAL MEDICINE CLINIC | Age: 80
End: 2020-08-28
Payer: MEDICARE

## 2020-08-28 DIAGNOSIS — R73.01 IFG (IMPAIRED FASTING GLUCOSE): ICD-10-CM

## 2020-08-28 DIAGNOSIS — Z00.00 MEDICARE ANNUAL WELLNESS VISIT, SUBSEQUENT: Primary | ICD-10-CM

## 2020-08-28 DIAGNOSIS — I10 ESSENTIAL HYPERTENSION: ICD-10-CM

## 2020-08-28 DIAGNOSIS — E78.2 MIXED HYPERLIPIDEMIA: ICD-10-CM

## 2020-08-28 DIAGNOSIS — Z13.31 SCREENING FOR DEPRESSION: ICD-10-CM

## 2020-08-28 DIAGNOSIS — I25.10 CORONARY ARTERY DISEASE INVOLVING NATIVE CORONARY ARTERY OF NATIVE HEART WITHOUT ANGINA PECTORIS: ICD-10-CM

## 2020-08-28 DIAGNOSIS — K21.9 GASTROESOPHAGEAL REFLUX DISEASE, ESOPHAGITIS PRESENCE NOT SPECIFIED: ICD-10-CM

## 2020-08-28 PROCEDURE — G8432 DEP SCR NOT DOC, RNG: HCPCS | Performed by: INTERNAL MEDICINE

## 2020-08-28 PROCEDURE — G8400 PT W/DXA NO RESULTS DOC: HCPCS | Performed by: INTERNAL MEDICINE

## 2020-08-28 PROCEDURE — G8427 DOCREV CUR MEDS BY ELIG CLIN: HCPCS | Performed by: INTERNAL MEDICINE

## 2020-08-28 PROCEDURE — 99442 PR PHYS/QHP TELEPHONE EVALUATION 11-20 MIN: CPT | Performed by: INTERNAL MEDICINE

## 2020-08-28 PROCEDURE — G8420 CALC BMI NORM PARAMETERS: HCPCS | Performed by: INTERNAL MEDICINE

## 2020-08-28 PROCEDURE — G8756 NO BP MEASURE DOC: HCPCS | Performed by: INTERNAL MEDICINE

## 2020-08-28 PROCEDURE — 1101F PT FALLS ASSESS-DOCD LE1/YR: CPT | Performed by: INTERNAL MEDICINE

## 2020-08-28 PROCEDURE — G8536 NO DOC ELDER MAL SCRN: HCPCS | Performed by: INTERNAL MEDICINE

## 2020-08-28 PROCEDURE — G0439 PPPS, SUBSEQ VISIT: HCPCS | Performed by: INTERNAL MEDICINE

## 2020-08-28 NOTE — PROGRESS NOTES
Loren Frank is a [de-identified] y.o. female, evaluated via audio-only technology on 8/28/2020 for Blood Pressure Check; Cholesterol Problem; and Annual Wellness Visit  . Assessment & Plan:   Diagnoses and all orders for this visit:    1. Medicare annual wellness visit, subsequent    2. Screening for depression  -     DEPRESSION SCREEN ANNUAL    3. Essential hypertension  Controlled on current regimen, continue   4. Gastroesophageal reflux disease, esophagitis presence not specified  Controlled on current regimen, continue   5. IFG (impaired fasting glucose)  a1c stable at 5.9  6. Mixed hyperlipidemia  At goal - continue current medications   7. Coronary artery disease involving native coronary artery of native heart without angina pectoris  No symptoms - followed by Dr. Berry Floor    Follow-up and Dispositions    · Return in about 6 months (around 2/28/2021) for bp, chol, gerd. 12  Subjective:     Has been okay overall. Has been checking bp 1-2 x per week and has been 123/62, HR 59; 128/70, HR 73; 114/56, HR 61. No chest pain, sob, dizziness, weakness. Hasn't been able to walk because  has dementia and has to stay home with him. Son is at the house for a few hours while she runs errands. Daughter will be visiting next week. Went over labs - renal function normal.  GERD is well controlled with famotidine unless she eats too much at night. Weight is stable.  was in hospital a few months ago after falling x 4, then was in rehab, gaining strength. Walking with walker. Able to feed himself but needs helps dressing. Gets up multiple times overnight and she has to get up with him. Prior to Admission medications    Medication Sig Start Date End Date Taking?  Authorizing Provider   ezetimibe (ZETIA) 10 mg tablet TAKE 1 TABLET EVERY DAY 7/18/20  Yes Selam Tejeda MD   rosuvastatin (CRESTOR) 20 mg tablet TAKE 1 TABLET EVERY NIGHT 6/25/20  Yes Evon Olivares MD   losartan (COZAAR) 25 mg tablet TAKE 1 TABLET EVERY DAY 6/25/20  Yes Leticia Bowers MD   amLODIPine (NORVASC) 5 mg tablet TAKE 2 TABLETS EVERY DAY 6/25/20  Yes Leticia Bowers MD   clopidogreL (PLAVIX) 75 mg tab TAKE 1 TABLET EVERY DAY 6/25/20  Yes Stephen Kenae MD   carvediloL (COREG) 3.125 mg tablet Take 1 Tab by mouth two (2) times a day. 6/2/20  Yes Stephen Keane MD   famotidine (PEPCID) 20 mg tablet TAKE 1 TABLET TWICE DAILY 5/30/20  Yes Soraida Milner MD   cyanocobalamin 1,000 mcg tablet Take 1,000 mcg by mouth daily. Yes Provider, Historical   nicotinic acid (NIACIN) 500 mg tablet Take 500 mg by mouth Daily (before breakfast). Yes Provider, Historical   cholecalciferol, vitamin D3, (VITAMIN D3) 2,000 unit tab Take 1 Tab by mouth daily. Yes Provider, Historical   aspirin 81 mg tablet Take 81 mg by mouth daily. Yes Provider, Historical         ROS  Per HPI  Patient-Reported Vitals 8/28/2020   Patient-Reported Weight 118lb   Patient-Reported Height -   Patient-Reported Pulse 61   Patient-Reported Systolic  432   Patient-Reported Diastolic 56        Lili Ritter, who was evaluated through a patient-initiated, synchronous (real-time) audio only encounter, and/or her healthcare decision maker, is aware that it is a billable service, with coverage as determined by her insurance carrier. She provided verbal consent to proceed: Yes. She has not had a related appointment within my department in the past 7 days or scheduled within the next 24 hours. Total Time: minutes: 11-20 minutes    Dejon Rodriguez MD   This is the Subsequent Medicare Annual Wellness Exam, performed 12 months or more after the Initial AWV or the last Subsequent AWV    I have reviewed the patient's medical history in detail and updated the computerized patient record.      History     Patient Active Problem List   Diagnosis Code    HTN (hypertension) I10    Allergic rhinitis J30.9    CAD (coronary artery disease) I25.10    PAC (premature atrial contraction) I49.1    Esophageal reflux K21.9    Other and unspecified hyperlipidemia E78.5    GERD (gastroesophageal reflux disease) K21.9    PVC (premature ventricular contraction) I49.3    Carotid artery stenosis I65.29    IFG (impaired fasting glucose) R73.01    Heart palpitations R00.2    Hyperlipidemia E78.5    Coronary artery disease involving native coronary artery of native heart without angina pectoris I25.10     Past Medical History:   Diagnosis Date    CAD (coronary artery disease)     CKD (chronic kidney disease), stage III ([facility] [address] 9/26/2014    Followed by Dr. 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 Julio Cesar Palacio HX TONSILLECTOMY       Current Outpatient Medications   Medication Sig Dispense Refill    ezetimibe (ZETIA) 10 mg tablet TAKE 1 TABLET EVERY DAY 90 Tab 3    rosuvastatin (CRESTOR) 20 mg tablet TAKE 1 TABLET EVERY NIGHT 90 Tab 3    losartan (COZAAR) 25 mg tablet TAKE 1 TABLET EVERY DAY 90 Tab 3    amLODIPine (NORVASC) 5 mg tablet TAKE 2 TABLETS EVERY  Tab 3    clopidogreL (PLAVIX) 75 mg tab TAKE 1 TABLET EVERY DAY 90 Tab 3    carvediloL (COREG) 3.125 mg tablet Take 1 Tab by mouth two (2) times a day. 180 Tab 3    famotidine (PEPCID) 20 mg tablet TAKE 1 TABLET TWICE DAILY 180 Tab 3    cyanocobalamin 1,000 mcg tablet Take 1,000 mcg by mouth daily.  nicotinic acid (NIACIN) 500 mg tablet Take 500 mg by mouth Daily (before breakfast).  cholecalciferol, vitamin D3, (VITAMIN D3) 2,000 unit tab Take 1 Tab by mouth daily.  aspirin 81 mg tablet Take 81 mg by mouth daily.        Allergies Allergen Reactions    Lisinopril Other (comments)     High K       Family History   Problem Relation Age of Onset    Hypertension Mother     Diabetes Maternal Grandmother     Diabetes Paternal Grandfather      Social History     Tobacco Use    Smoking status: Never Smoker    Smokeless tobacco: Never Used   Substance Use Topics    Alcohol use: No       Depression Risk Factor Screening:     3 most recent PHQ Screens 2/26/2020   Little interest or pleasure in doing things Not at all   Feeling down, depressed, irritable, or hopeless Not at all   Total Score PHQ 2 0       Alcohol Risk Factor Screening:   Do you average 1 drink per night or more than 7 drinks a week:  No    On any one occasion in the past three months have you have had more than 3 drinks containing alcohol:  No      Functional Ability and Level of Safety:   Hearing: The patient wears hearing aids. Activities of Daily Living: The home contains: handrails and grab bars  Patient does total self care     Ambulation: with no difficulty     Fall Risk:  Fall Risk Assessment, last 12 mths 8/28/2020   Able to walk? Yes   Fall in past 12 months? No   Fall with injury? -   Number of falls in past 12 months -   Fall Risk Score -     Abuse Screen:  Patient is not abused       Cognitive Screening   Has your family/caregiver stated any concerns about your memory: no    Cognitive Screening: N/A    Patient Care Team   Patient Care Team:  Valerie Smith MD as PCP - General  Valerie Smith MD as PCP - [facility] Empaneled Provider  Lata Metzger MD (Cardiology)  Darrell Covarrubias OD (Optometry)    Assessment/Plan   Education and counseling provided:  Are appropriate based on today's review and evaluation    Diagnoses and all orders for this visit:    1. Medicare annual wellness visit, subsequent    2. Screening for depression  -     DEPRESSION SCREEN ANNUAL    3. Essential hypertension    4.  Gastroesophageal reflux disease, esophagitis presence not specified    5. IFG (impaired fasting glucose)    6. Mixed hyperlipidemia    7. Coronary artery disease involving native coronary artery of native heart without angina pectoris        There are no preventive care reminders to display for this patient. Gaby Perla, who was evaluated through a synchronous (real-time) audio only encounter, and/or her healthcare decision maker, is aware that it is a billable service, with coverage as determined by her insurance carrier. She provided verbal consent to proceed: Yes, and patient identification was verified. It was conducted pursuant to the emergency declaration under the [address] [place] [address] authority and the [employer] Act. A caregiver was present when appropriate. Ability to conduct physical exam was limited. I was at home. The patient was at home.     Opal Agustin MD

## 2020-08-28 NOTE — PROGRESS NOTES
Arsenio Edwina  Identified pt with two pt identifiers(name and ). Chief Complaint   Patient presents with    Blood Pressure Check    Cholesterol Problem    Annual Wellness Visit       Reviewed record In preparation for visit and have obtained necessary documentation. 1. Have you been to the ER, urgent care clinic or hospitalized since your last visit? No     2. Have you seen or consulted any other health care providers outside of the [address] [place] [address] since your last visit? Include any pap smears or colon screening. No    Patient does not have an advance directive. Vitals reviewed with provider. Health Maintenance reviewed:     Health Maintenance Due   Topic    Pneumococcal 65+ years (1 of 1 - PPSV23)    Medicare Yearly Exam           Wt Readings from Last 3 Encounters:   20 118 lb 8 oz (53.8 kg)   20 119 lb (54 kg)   20 119 lb (54 kg)        Temp Readings from Last 3 Encounters:   20 98.2 °F (36.8 °C) (Oral)   19 98.1 °F (36.7 °C) (Oral)   19 98.2 °F (36.8 °C)        BP Readings from Last 3 Encounters:   20 129/70   20 130/62   20 130/62        Pulse Readings from Last 3 Encounters:   20 68   20 72   19 (!) 49      There were no vitals filed for this visit. Learning Assessment:   :       Learning Assessment 2014   PRIMARY LEARNER Patient   HIGHEST LEVEL OF EDUCATION - PRIMARY LEARNER  DID NOT GRADUATE HIGH SCHOOL   BARRIERS PRIMARY LEARNER NONE   CO-LEARNER CAREGIVER No   PRIMARY LANGUAGE ENGLISH   LEARNER PREFERENCE PRIMARY DEMONSTRATION   ANSWERED BY patient   RELATIONSHIP SELF        Depression Screening:   :       3 most recent PHQ Screens 2020   Little interest or pleasure in doing things Not at all   Feeling down, depressed, irritable, or hopeless Not at all   Total Score PHQ 2 0        Fall Risk Assessment:   :       Fall Risk Assessment, last 12 mths 2020   Able to walk?  Yes   Fall in past 12 months? No   Fall with injury? -   Number of falls in past 12 months -   Fall Risk Score -        Abuse Screening:   :       Abuse Screening Questionnaire 8/28/2020 8/21/2019 8/10/2018 3/6/2017 3/16/2016 6/13/2014   Do you ever feel afraid of your partner? N N N N N N   Are you in a relationship with someone who physically or mentally threatens you? N N N N N N   Is it safe for you to go home?  Y Y Y Y Y Y        ADL Screening:   :       ADL Assessment 3/6/2017   Feeding yourself No Help Needed   Getting from bed to chair No Help Needed   Getting dressed No Help Needed   Bathing or showering No Help Needed   Walk across the room (includes cane/walker) No Help Needed   Using the telphone No Help Needed   Taking your medications No Help Needed   Preparing meals No Help Needed   Managing money (expenses/bills) No Help Needed   Moderately strenuous housework (laundry) No Help Needed   Shopping for personal items (toiletries/medicines) No Help Needed   Shopping for groceries No Help Needed   Driving No Help Needed   Climbing a flight of stairs No Help Needed   Getting to places beyond walking distances No Help Needed

## 2020-08-28 NOTE — PATIENT INSTRUCTIONS
Medicare Wellness Visit, Female The best way to live healthy is to have a lifestyle where you eat a well-balanced diet, exercise regularly, limit alcohol use, and quit all forms of tobacco/nicotine, if applicable. Regular preventive services are another way to keep healthy. Preventive services (vaccines, screening tests, monitoring & exams) can help personalize your care plan, which helps you manage your own care. Screening tests can find health problems at the earliest stages, when they are easiest to treat. [facility] follows the current, evidence-based guidelines published by the [facility] Dial[facility] ([facility]) when recommending preventive services for our patients. Because we follow these guidelines, sometimes recommendations change over time as research supports it. (For example, mammograms used to be recommended annually. Even though Medicare will still pay for an annual mammogram, the newer guidelines recommend a mammogram every two years for women of average risk). Of course, you and your doctor may decide to screen more often for some diseases, based on your risk and your co-morbidities (chronic disease you are already diagnosed with). Preventive services for you include: - Medicare offers their members a free annual wellness visit, which is time for you and your primary care provider to discuss and plan for your preventive service needs. Take advantage of this benefit every year! 
-All adults over the age of 72 should receive the recommended pneumonia vaccines. Current USPSTF guidelines recommend a series of two vaccines for the best pneumonia protection.  
-All adults should have a flu vaccine yearly and a tetanus vaccine every 10 years.  
-All adults age 48 and older should receive the shingles vaccines (series of two vaccines). -All adults age 38-68 who are overweight should have a diabetes screening test once every three years. -All adults born between [birth year] and 1966 should be screened once for Hepatitis C. 
-Other screening tests and preventive services for persons with diabetes include: an eye exam to screen for diabetic retinopathy, a kidney function test, a foot exam, and stricter control over your cholesterol.  
-Cardiovascular screening for adults with routine risk involves an electrocardiogram (ECG) at intervals determined by your doctor.  
-Colorectal cancer screenings should be done for adults age 54-65 with no increased risk factors for colorectal cancer. There are a number of acceptable methods of screening for this type of cancer. Each test has its own benefits and drawbacks. Discuss with your doctor what is most appropriate for you during your annual wellness visit. The different tests include: colonoscopy (considered the best screening method), a fecal occult blood test, a fecal DNA test, and sigmoidoscopy. 
 
-A bone mass density test is recommended when a woman turns 65 to screen for osteoporosis. This test is only recommended one time, as a screening. Some providers will use this same test as a disease monitoring tool if you already have osteoporosis. -Breast cancer screenings are recommended every other year for women of normal risk, age 54-69. 
-Cervical cancer screenings for women over age 72 are only recommended with certain risk factors. Here is a list of your current Health Maintenance items (your personalized list of preventive services) with a due date: 
Health Maintenance Due Topic Date Due  Pneumococcal Vaccine (1 of 1 - PPSV23) 12/21/2004 Andrae Macias Annual Well Visit  08/21/2020

## 2020-12-01 ENCOUNTER — OFFICE VISIT (OUTPATIENT)
Dept: CARDIOLOGY CLINIC | Age: 80
End: 2020-12-01
Payer: MEDICARE

## 2020-12-01 VITALS
DIASTOLIC BLOOD PRESSURE: 70 MMHG | OXYGEN SATURATION: 98 % | SYSTOLIC BLOOD PRESSURE: 120 MMHG | WEIGHT: 120.6 LBS | BODY MASS INDEX: 20.59 KG/M2 | HEART RATE: 84 BPM | HEIGHT: 64 IN | RESPIRATION RATE: 14 BRPM

## 2020-12-01 DIAGNOSIS — I49.3 PVC (PREMATURE VENTRICULAR CONTRACTION): ICD-10-CM

## 2020-12-01 DIAGNOSIS — I34.0 NONRHEUMATIC MITRAL VALVE REGURGITATION: ICD-10-CM

## 2020-12-01 DIAGNOSIS — I10 ESSENTIAL HYPERTENSION: Primary | ICD-10-CM

## 2020-12-01 DIAGNOSIS — I25.10 CORONARY ARTERY DISEASE INVOLVING NATIVE CORONARY ARTERY OF NATIVE HEART WITHOUT ANGINA PECTORIS: ICD-10-CM

## 2020-12-01 DIAGNOSIS — I25.111 CORONARY ARTERY DISEASE INVOLVING NATIVE CORONARY ARTERY OF NATIVE HEART WITH ANGINA PECTORIS WITH DOCUMENTED SPASM (HCC): ICD-10-CM

## 2020-12-01 DIAGNOSIS — R00.1 BRADYCARDIA: ICD-10-CM

## 2020-12-01 DIAGNOSIS — E78.2 MIXED HYPERLIPIDEMIA: ICD-10-CM

## 2020-12-01 PROCEDURE — G8754 DIAS BP LESS 90: HCPCS | Performed by: INTERNAL MEDICINE

## 2020-12-01 PROCEDURE — G8510 SCR DEP NEG, NO PLAN REQD: HCPCS | Performed by: INTERNAL MEDICINE

## 2020-12-01 PROCEDURE — G8752 SYS BP LESS 140: HCPCS | Performed by: INTERNAL MEDICINE

## 2020-12-01 PROCEDURE — 1090F PRES/ABSN URINE INCON ASSESS: CPT | Performed by: INTERNAL MEDICINE

## 2020-12-01 PROCEDURE — 99214 OFFICE O/P EST MOD 30 MIN: CPT | Performed by: INTERNAL MEDICINE

## 2020-12-01 PROCEDURE — G8427 DOCREV CUR MEDS BY ELIG CLIN: HCPCS | Performed by: INTERNAL MEDICINE

## 2020-12-01 PROCEDURE — G8420 CALC BMI NORM PARAMETERS: HCPCS | Performed by: INTERNAL MEDICINE

## 2020-12-01 PROCEDURE — G8536 NO DOC ELDER MAL SCRN: HCPCS | Performed by: INTERNAL MEDICINE

## 2020-12-01 PROCEDURE — 1101F PT FALLS ASSESS-DOCD LE1/YR: CPT | Performed by: INTERNAL MEDICINE

## 2020-12-01 PROCEDURE — 93000 ELECTROCARDIOGRAM COMPLETE: CPT | Performed by: INTERNAL MEDICINE

## 2020-12-01 PROCEDURE — G8400 PT W/DXA NO RESULTS DOC: HCPCS | Performed by: INTERNAL MEDICINE

## 2020-12-01 NOTE — PROGRESS NOTES
Froilan Barnhart CHIEF COMPLAINT      Sukhwinder Lo is a [de-identified] y.o. female who was seen on 12/1/2020. Patient is being seen today for HTN, GERD, XOL. She is doing ok, has some stressful days and sometimes has weak spells. They are a lot better since she cut down on the carvedilol to 1/2 tablet twice a day. Also has some swelling in the feet, toes get numb at times. Suspect it is related. Could be early SS- reduced  coreg and she does feel better. Wants to take biotin for nail and I think that is fine    Having some palpitations in the middle of the night. Lots of caregiver strain with her . I think that is the major source of her fatigue    HR racing at night and skipping when she gets back to bed. Sometimes she feels like it is pumping hard at night. HR resting at 60 -70    Reviewed her echo and carotid artery studies. She wonders about labs. Has a lot of care for her  which is a lot of stress and responsibility- has to help dress him etc.   She is not able to walk anymore as she is not able to leave him. She has some dizziness here and there, saw ENT and he diagnosed labyrinthitis but no falls etc.     BP generally doing fine. High at the dentist but otherwise fine. Her amlodipine was increased to 10mg daily  And she is having swelling now- will get compression stockings ordered. ASSESSMENT      1. HTN - great today will reduce coreg as above. .   2. CAD- stable, s/p TRANG to RCA and mid LAD 3/11, cath 8/12 with proximal RCA stent, no ischemia on stress test in 2018  -lifelong plavix and aspirin (her decision after discussion of risks and benefits)  -continue coreg and statin  3. Palpitations- better on coreg, still had v-bigemeny on last holter  4. Hyperlipidemia- LDL 86 in 8/19 on crestor 20mg daily and zetia  -declined PCSK9 inhibition in the past   5. Anxiety- better since she stopped checking her blood pressure at home  6. Reflux- on pepcid   7. Vit D deficiency 2000u/day  8. Impaired glucose tolerance- 5.9%, exercising regularly   9. CKD Stage 3-stable, last creatinine 1.0, seen by Dr. Nichole Davidson. -hyperkalemia in the past improved with stopping spironolactone  -last K 5.2 in 8/19  10. Mild to moderate mitral regurgitation   11. PAD - bilateral carotid bruits, mild Bety and moderate LICA  12. Possible SS- was above watch for now and will get loop if sx progress. 13. Edema- diuretic versus reducing amlodipine- fu one month     Stress nuc 4/18 wnl  Echo 11/15 EF 60%, mild TR, PAP 25, mild-mod MR  Carotid US: Mod LICA plaque <72%, no evidence of Subclavian Steal.  Stress nuc: 1/14 normal, EF 69%  Echo 9/12 EF 60, mod MR, mild TR  Cath 2012 RCA stent     Fhx no early cad  Soc no tob, no etoh  We discussed the expected course, resolution and complications of the diagnosis(es) in detail. Medication risks, benefits, costs, interactions, and alternatives were discussed as indicated. I advised her to contact the office if her condition worsens, changes or fails to improve as anticipated.  She expressed understanding with the diagnosis(es) and plan    HISTORY OF PRESENTING ILLNESS      Moran Hamman is a [de-identified] y.o. female        ACTIVE PROBLEM LIST     Patient Active Problem List    Diagnosis Date Noted    Coronary artery disease involving native coronary artery of native heart without angina pectoris 04/18/2018    Heart palpitations 10/18/2017    Hyperlipidemia 10/18/2017    IFG (impaired fasting glucose) 06/13/2014    Carotid artery stenosis 03/13/2014    PVC (premature ventricular contraction) 11/07/2013    GERD (gastroesophageal reflux disease) 02/13/2013    Esophageal reflux 01/23/2012    Other and unspecified hyperlipidemia 01/23/2012    PAC (premature atrial contraction) 09/27/2011    CAD (coronary artery disease) 04/04/2011    Allergic rhinitis 11/03/2010    HTN (hypertension) 10/01/2010           PAST MEDICAL HISTORY     Past Medical History:   Diagnosis Date    CAD (coronary artery disease)     CKD (chronic kidney disease), stage III ([facility] [address] 9/26/2014    Followed by Dr. 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 Ibarra New   Allergen Reactions    Lisinopril Other (comments)     High K          FAMILY HISTORY     Family History   Problem Relation Age of Onset    Hypertension Mother     Diabetes Maternal Grandmother     Diabetes Paternal Grandfather     negative for cardiac disease       SOCIAL HISTORY     Social History     Socioeconomic History    Marital status:      Spouse name: Not on file    Number of children: Not on file    Years of education: Not on file    Highest education level: Not on file   Tobacco Use    Smoking status: Never Smoker    Smokeless tobacco: Never Used   Substance and Sexual Activity    Alcohol use: No    Drug use: No    Sexual activity: Never         MEDICATIONS     Current Outpatient Medications   Medication Sig    ezetimibe (ZETIA) 10 mg tablet TAKE 1 TABLET EVERY DAY    rosuvastatin (CRESTOR) 20 mg tablet TAKE 1 TABLET EVERY NIGHT    losartan (COZAAR) 25 mg tablet TAKE 1 TABLET EVERY DAY    amLODIPine (NORVASC) 5 mg tablet TAKE 2 TABLETS EVERY DAY    clopidogreL (PLAVIX) 75 mg tab TAKE 1 TABLET EVERY DAY    carvediloL (COREG) 3.125 mg tablet Take 1 Tab by mouth two (2) times a day.  famotidine (PEPCID) 20 mg tablet TAKE 1 TABLET TWICE DAILY    cyanocobalamin 1,000 mcg tablet Take 1,000 mcg by mouth daily.     nicotinic acid (NIACIN) 500 mg tablet Take 500 mg by mouth Daily (before breakfast).  cholecalciferol, vitamin D3, (VITAMIN D3) 2,000 unit tab Take 1 Tab by mouth daily.  aspirin 81 mg tablet Take 81 mg by mouth daily. No current facility-administered medications for this visit. I have reviewed the nurses notes, vitals, problem list, allergy list, medical history, family, social history and medications. REVIEW OF SYMPTOMS     Constitutional: Negative for fever, chills, malaise/fatigue and diaphoresis. Respiratory: Negative for cough, hemoptysis, sputum production, shortness of breath and wheezing. Cardiovascular: Negative for chest pain, palpitations, orthopnea, claudication, leg swelling and PND. Gastrointestinal: Negative for heartburn, nausea, vomiting, blood in stool and melena. Genitourinary: Negative for dysuria and flank pain. Musculoskeletal: Negative for joint pain and back pain. Skin: Negative for rash. Neurological: Negative for focal weakness, seizures, loss of consciousness, weakness and headaches. Endo/Heme/Allergies: Negative for abnormal bleeding. Psychiatric/Behavioral: Negative for memory loss. PHYSICAL EXAMINATION          General:  in no acute distress, cooperative and alert  Respiratory: No audible wheezing, no signs of respiratory distress  Neuro: A&Ox3, speech clear,  answering questions appropriately  Breathing unlabored  Mentation logical, thoughts ordered       DIAGNOSTIC DATA      No specialty comments available.        LABORATORY DATA      Lab Results   Component Value Date/Time    WBC 7.0 08/17/2019 09:22 PM    HGB 14.0 08/17/2019 09:22 PM    HCT 43.0 08/17/2019 09:22 PM    PLATELET 093 67/99/1643 09:22 PM    MCV 90.0 08/17/2019 09:22 PM      Lab Results   Component Value Date/Time    Sodium 143 07/31/2020 09:37 AM    Potassium 4.9 07/31/2020 09:37 AM    Chloride 105 07/31/2020 09:37 AM    CO2 21 07/31/2020 09:37 AM    Anion gap 10 08/17/2019 09:22 PM    Glucose 110 (H) 07/31/2020 09:37 AM    BUN 26 07/31/2020 09:37 AM Creatinine 0.86 07/31/2020 09:37 AM    BUN/Creatinine ratio 30 (H) 07/31/2020 09:37 AM    GFR est AA 74 07/31/2020 09:37 AM    GFR est non-AA 64 07/31/2020 09:37 AM    Calcium 10.0 07/31/2020 09:37 AM    Bilirubin, total 0.6 07/31/2020 09:37 AM    Alk. phosphatase 87 07/31/2020 09:37 AM    Protein, total 7.0 07/31/2020 09:37 AM    Albumin 4.6 07/31/2020 09:37 AM    Globulin 3.4 08/17/2019 09:22 PM    A-G Ratio 1.9 07/31/2020 09:37 AM    ALT (SGPT) 17 07/31/2020 09:37 AM             FOLLOW-UP            Patient was made aware and verbalized understanding that an appointment will be scheduled for them for a virtual visit and/or office visit within the above time frame. Patient understanding his/her responsibility to call and change time/date if he/she so chooses. Thank you, Diamond Caballero MD for allowing me to participate in the care of Farhana West. Please do not hesitate to contact me for further questions/concerns. Greater than 20 minutes was spent in direct video patient care, planning and chart review. This visit was conducted using [employer] telemedicine services.        Jonathon Cantrell MD    [address]        [phone] / [phone] Fax       [facility] [address] [facility] [address]  [facility]  [phone] / [phone] Fax

## 2021-03-01 ENCOUNTER — OFFICE VISIT (OUTPATIENT)
Dept: INTERNAL MEDICINE CLINIC | Age: 81
End: 2021-03-01
Payer: MEDICARE

## 2021-03-01 VITALS
WEIGHT: 121.2 LBS | SYSTOLIC BLOOD PRESSURE: 120 MMHG | RESPIRATION RATE: 16 BRPM | HEART RATE: 70 BPM | TEMPERATURE: 98 F | HEIGHT: 64 IN | BODY MASS INDEX: 20.69 KG/M2 | DIASTOLIC BLOOD PRESSURE: 70 MMHG | OXYGEN SATURATION: 96 %

## 2021-03-01 DIAGNOSIS — R73.01 IFG (IMPAIRED FASTING GLUCOSE): ICD-10-CM

## 2021-03-01 DIAGNOSIS — I10 ESSENTIAL HYPERTENSION: Primary | ICD-10-CM

## 2021-03-01 DIAGNOSIS — M25.522 LEFT ELBOW PAIN: ICD-10-CM

## 2021-03-01 DIAGNOSIS — E78.2 MIXED HYPERLIPIDEMIA: ICD-10-CM

## 2021-03-01 DIAGNOSIS — K21.9 GASTROESOPHAGEAL REFLUX DISEASE, UNSPECIFIED WHETHER ESOPHAGITIS PRESENT: ICD-10-CM

## 2021-03-01 PROCEDURE — G8420 CALC BMI NORM PARAMETERS: HCPCS | Performed by: INTERNAL MEDICINE

## 2021-03-01 PROCEDURE — G8754 DIAS BP LESS 90: HCPCS | Performed by: INTERNAL MEDICINE

## 2021-03-01 PROCEDURE — G8400 PT W/DXA NO RESULTS DOC: HCPCS | Performed by: INTERNAL MEDICINE

## 2021-03-01 PROCEDURE — G8536 NO DOC ELDER MAL SCRN: HCPCS | Performed by: INTERNAL MEDICINE

## 2021-03-01 PROCEDURE — G8427 DOCREV CUR MEDS BY ELIG CLIN: HCPCS | Performed by: INTERNAL MEDICINE

## 2021-03-01 PROCEDURE — 1101F PT FALLS ASSESS-DOCD LE1/YR: CPT | Performed by: INTERNAL MEDICINE

## 2021-03-01 PROCEDURE — 1090F PRES/ABSN URINE INCON ASSESS: CPT | Performed by: INTERNAL MEDICINE

## 2021-03-01 PROCEDURE — G8752 SYS BP LESS 140: HCPCS | Performed by: INTERNAL MEDICINE

## 2021-03-01 PROCEDURE — G8510 SCR DEP NEG, NO PLAN REQD: HCPCS | Performed by: INTERNAL MEDICINE

## 2021-03-01 PROCEDURE — 99214 OFFICE O/P EST MOD 30 MIN: CPT | Performed by: INTERNAL MEDICINE

## 2021-03-01 NOTE — PROGRESS NOTES
HPI  Ms. Teetee Suresh is a 79y.o. year old female, she is seen today for follow up HTN, high cholesterol, GERD. Has had left arm swelling and around elbow since she was sweeping snow a few weeks. No chest pain or sob. Feels \"woozy\" a lot - wondering if medications are causing this. Not dizzy, not lightheaded, normally only happens when she is walking. Will rest or continue on and it will go away on its own. Seems better if she eats but not consistent. Says fatigue seems to improve after second meal.  Also tells me she was diagnosed with labrynthitis by ENT. Hasn't checked her BP during a spell when she feels weak. Says she will get tired after doing extra activity around her home. 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 She doesn't always get much sleep at night. Says she feels cold all the time. Says has always been cold natured but worse since older. Will get occasional leg cramps in feet at night. Thinks she is drinking enough water. BP was 118/63 at home this morning. Doesn't check BP much. No blood in stool or melena. GERD overall controlled, worse with certain foods like tomato sauce which she avoids. Chief Complaint   Patient presents with    Hypertension    Cholesterol Problem    GERD    Arm Pain     Elbow to risk         Prior to Admission medications    Medication Sig Start Date End Date Taking?  Authorizing Provider   ezetimibe (ZETIA) 10 mg tablet TAKE 1 TABLET EVERY DAY 7/18/20  Yes Yuliya Wick MD   rosuvastatin (CRESTOR) 20 mg tablet TAKE 1 TABLET EVERY NIGHT 6/25/20  Yes Tj Gaspar MD   losartan (COZAAR) 25 mg tablet TAKE 1 TABLET EVERY DAY 6/25/20  Yes Tj Gaspar MD   amLODIPine (NORVASC) 5 mg tablet TAKE 2 TABLETS EVERY DAY 6/25/20  Yes Tj Gaspar MD   clopidogreL (PLAVIX) 75 mg tab TAKE 1 TABLET EVERY DAY 6/25/20  Yes Yuliya Wick MD   carvediloL (COREG) 3.125 mg tablet Take 1 Tab by mouth two (2) times a day. 6/2/20  Yes Sue Rausch MD   famotidine (PEPCID) 20 mg tablet TAKE 1 TABLET TWICE DAILY 5/30/20  Yes Linden Leslie MD   cyanocobalamin 1,000 mcg tablet Take 1,000 mcg by mouth daily. Yes Provider, Historical   nicotinic acid (NIACIN) 500 mg tablet Take 500 mg by mouth Daily (before breakfast). Yes Provider, Historical   cholecalciferol, vitamin D3, (VITAMIN D3) 2,000 unit tab Take 1 Tab by mouth daily. Yes Provider, Historical   aspirin 81 mg tablet Take 81 mg by mouth daily. Yes Provider, Historical         Allergies   Allergen Reactions    Lisinopril Other (comments)     High K         REVIEW OF SYSTEMS:  Per HPI    PHYSICAL EXAM:  Visit Vitals  /70   Pulse 70   Temp 98 °F (36.7 °C) (Oral)   Resp 16   Ht 5' 4\" (1.626 m)   Wt 121 lb 3.2 oz (55 kg)   SpO2 96%   BMI 20.80 kg/m²     Constitutional: Appears well-developed and well-nourished. No distress. HENT:   Head: Normocephalic and atraumatic. Eyes: No scleral icterus. Neck: no lad, no tm, supple   Cardiovascular: Normal S1/S2, regular rhythm. No murmurs, rubs, or gallops. Pulmonary/Chest: Effort normal and breath sounds normal. No respiratory distress. No wheezes, rhonchi, or rales. MSK: mild pain medial aspect left inner arm around elbow  Ext: trace b/l ankle edema. Neurological: Alert. Psychiatric: Normal mood and affect.  Behavior is normal.     Lab Results   Component Value Date/Time    Sodium 143 07/31/2020 09:37 AM    Potassium 4.9 07/31/2020 09:37 AM    Chloride 105 07/31/2020 09:37 AM    CO2 21 07/31/2020 09:37 AM    Anion gap 10 08/17/2019 09:22 PM    Glucose 110 (H) 07/31/2020 09:37 AM    BUN 26 07/31/2020 09:37 AM    Creatinine 0.86 07/31/2020 09:37 AM    BUN/Creatinine ratio 30 (H) 07/31/2020 09:37 AM    GFR est AA 74 07/31/2020 09:37 AM    GFR est non-AA 64 07/31/2020 09:37 AM    Calcium 10.0 07/31/2020 09:37 AM    Bilirubin, total 0.6 07/31/2020 09:37 AM Alk. phosphatase 87 07/31/2020 09:37 AM    Protein, total 7.0 07/31/2020 09:37 AM    Albumin 4.6 07/31/2020 09:37 AM    Globulin 3.4 08/17/2019 09:22 PM    A-G Ratio 1.9 07/31/2020 09:37 AM    ALT (SGPT) 17 07/31/2020 09:37 AM     Lab Results   Component Value Date/Time    Hemoglobin A1c 5.9 (H) 07/31/2020 09:37 AM    Hemoglobin A1c 5.9 (H) 08/21/2019 10:28 AM    Hemoglobin A1c 6.0 (H) 02/12/2019 10:36 AM      Lab Results   Component Value Date/Time    Cholesterol, total 196 07/31/2020 09:37 AM    HDL Cholesterol 92 07/31/2020 09:37 AM    LDL, calculated 91 07/31/2020 09:37 AM    VLDL, calculated 13 07/31/2020 09:37 AM    Triglyceride 63 07/31/2020 09:37 AM    CHOL/HDL Ratio 3.1 09/28/2011 05:00 AM          ASSESSMENT/PLAN  Diagnoses and all orders for this visit:    1. Essential hypertension  -     METABOLIC PANEL, COMPREHENSIVE; Future  -     CBC WITH AUTOMATED DIFF; Future  Controlled - ?fatigue related to intermittent low BP - encouraged checking bp during those spells  2. IFG (impaired fasting glucose)  -     HEMOGLOBIN A1C WITH EAG; Future    3. Gastroesophageal reflux disease, unspecified whether esophagitis present  Stable - continue famotidine - avoid foods which exacerbate  4. Left elbow pain  Suspect strain - getting better - try cool compress  5. Mixed hyperlipidemia  -     LIPID PANEL; Future  -     TSH 3RD GENERATION; Future  On statin + zetia        Health Maintenance Due   Topic Date Due    COVID-19 Vaccine (1 of 2) 12/21/1956    GLAUCOMA SCREENING Q2Y  03/04/2021        Follow-up and Dispositions    · Return in about 6 months (around 9/1/2021) for bp, chol, ifg.            Reviewed plan of care. Patient has provided input and agrees with goals. The nurse provided the patient and/or family with advanced directive information if needed and encouraged the patient to provide a copy to the office when available.

## 2021-03-01 NOTE — PATIENT INSTRUCTIONS
Nighttime Leg Cramps: Care Instructions Your Care Instructions Nighttime leg cramps happen when a leg muscle tightens up suddenly. This most often happens in the calf. But cramps in the thigh or foot are also common. Cramps often occur just as you fall asleep or wake up. Leg cramps can be painful. They can last a few seconds to a few minutes. Though they are common, experts don't know exactly what causes them. To treat muscle cramps, you can stretch and massage the muscle. If cramps keep coming back, your doctor may prescribe medicine that relaxes your muscles. Follow-up care is a key part of your treatment and safety. Be sure to make and go to all appointments, and call your doctor if you are having problems. It's also a good idea to know your test results and keep a list of the medicines you take. How can you care for yourself at home? · To stop a leg cramp, sit down and straighten your leg as you bend your foot up toward your knee. It may help to place a rolled towel under the ball of your foot and, while you hold the towel at both ends, gently pull the towel toward you while you keep your knee straight. This stretches the calf muscles. The cramp usually goes away after a few minutes. · Take a warm shower or bath to relax the muscle. Some people find that a heating pad placed on the muscle can also help. Others get relief by rubbing the calf with an ice pack. · Stretch your muscles every day, especially before and after exercise and at bedtime. Regular stretching can relax your muscles and may prevent cramps. · Do not suddenly increase the amount of exercise you get. Increase your exercise a little each week. · If your doctor prescribes medicine, take it exactly as prescribed. Call your doctor if you think you are having a problem with your medicine. · Ask your doctor if you can take an over-the-counter pain medicine, such as acetaminophen (Tylenol), ibuprofen (Advil, Motrin), or naproxen (Aleve). Be safe with medicines. Read and follow all instructions on the label. · Drink plenty of fluids. If you have kidney, heart, or liver disease and have to limit fluids, talk with your doctor before you increase the amount of fluids you drink. When should you call for help? Watch closely for changes in your health, and be sure to contact your doctor if: 
  · You often have muscle cramps that do not go away after home treatment.  
  · Your muscle cramps often wake you up at night.  
  · You do not get better as expected. Where can you learn more? Go to http://www.gray.com/ Enter S910 in the search box to learn more about \"Nighttime Leg Cramps: Care Instructions. \" Current as of: November 20, 2019               Content Version: 12.6 © 9003-7138 [employer]. Care instructions adapted under license by [employer] (which disclaims liability or warranty for this information). If you have questions about a medical condition or this instruction, always ask your healthcare professional. [address] any warranty or liability for your use of this information.

## 2021-03-01 NOTE — PROGRESS NOTES
Rikki Bernardo  Identified pt with two pt identifiers(name and ). Chief Complaint   Patient presents with    Hypertension    Cholesterol Problem    GERD       Reviewed record In preparation for visit and have obtained necessary documentation. 1. Have you been to the ER, urgent care clinic or hospitalized since your last visit? No     2. Have you seen or consulted any other health care providers outside of the [address] [place] [address] since your last visit? Include any pap smears or colon screening. No    Patient does not have an advance directive. Vitals reviewed with provider. Health Maintenance reviewed:     Health Maintenance Due   Topic    COVID-19 Vaccine (1 of 2)    Shingrix Vaccine Age 50> (1 of 2)    Flu Vaccine (1)    GLAUCOMA SCREENING Q2Y           Wt Readings from Last 3 Encounters:   20 120 lb 9.6 oz (54.7 kg)   20 118 lb 8 oz (53.8 kg)   20 119 lb (54 kg)        Temp Readings from Last 3 Encounters:   20 98.2 °F (36.8 °C) (Oral)   19 98.1 °F (36.7 °C) (Oral)   19 98.2 °F (36.8 °C)        BP Readings from Last 3 Encounters:   20 120/70   20 129/70   20 130/62        Pulse Readings from Last 3 Encounters:   20 84   20 68   20 72      There were no vitals filed for this visit.        Learning Assessment:   :       Learning Assessment 2014   PRIMARY LEARNER Patient   HIGHEST LEVEL OF EDUCATION - PRIMARY LEARNER  DID NOT GRADUATE HIGH SCHOOL   BARRIERS PRIMARY LEARNER NONE   CO-LEARNER CAREGIVER No   PRIMARY LANGUAGE ENGLISH   LEARNER PREFERENCE PRIMARY DEMONSTRATION   ANSWERED BY patient   RELATIONSHIP SELF        Depression Screening:   :       3 most recent PHQ Screens 2020   Little interest or pleasure in doing things Not at all   Feeling down, depressed, irritable, or hopeless Not at all   Total Score PHQ 2 0        Fall Risk Assessment:   :       Fall Risk Assessment, last 12 mths 2020   Able to walk? Yes   Fall in past 12 months? No   Number of falls in past 12 months -   Fall with injury? -        Abuse Screening:   :       Abuse Screening Questionnaire 8/28/2020 8/21/2019 8/10/2018 3/6/2017 3/16/2016 6/13/2014   Do you ever feel afraid of your partner? N N N N N N   Are you in a relationship with someone who physically or mentally threatens you? N N N N N N   Is it safe for you to go home?  Y Y Y Y Y Y        ADL Screening:   :       ADL Assessment 3/6/2017   Feeding yourself No Help Needed   Getting from bed to chair No Help Needed   Getting dressed No Help Needed   Bathing or showering No Help Needed   Walk across the room (includes cane/walker) No Help Needed   Using the telphone No Help Needed   Taking your medications No Help Needed   Preparing meals No Help Needed   Managing money (expenses/bills) No Help Needed   Moderately strenuous housework (laundry) No Help Needed   Shopping for personal items (toiletries/medicines) No Help Needed   Shopping for groceries No Help Needed   Driving No Help Needed   Climbing a flight of stairs No Help Needed   Getting to places beyond walking distances No Help Needed

## 2021-04-21 DIAGNOSIS — K21.9 GASTROESOPHAGEAL REFLUX DISEASE WITHOUT ESOPHAGITIS: ICD-10-CM

## 2021-04-22 RX ORDER — FAMOTIDINE 20 MG/1
TABLET, FILM COATED ORAL
Qty: 180 TAB | Refills: 3 | Status: SHIPPED | OUTPATIENT
Start: 2021-04-22 | End: 2022-05-16

## 2021-05-25 RX ORDER — CARVEDILOL 3.12 MG/1
TABLET ORAL
Qty: 180 TABLET | Refills: 3 | Status: SHIPPED | OUTPATIENT
Start: 2021-05-25 | End: 2022-03-14 | Stop reason: DRUGHIGH

## 2021-06-03 ENCOUNTER — OFFICE VISIT (OUTPATIENT)
Dept: CARDIOLOGY CLINIC | Age: 81
End: 2021-06-03
Payer: MEDICARE

## 2021-06-03 VITALS
HEART RATE: 70 BPM | SYSTOLIC BLOOD PRESSURE: 130 MMHG | OXYGEN SATURATION: 98 % | HEIGHT: 64 IN | BODY MASS INDEX: 20.83 KG/M2 | WEIGHT: 122 LBS | DIASTOLIC BLOOD PRESSURE: 80 MMHG | RESPIRATION RATE: 13 BRPM

## 2021-06-03 DIAGNOSIS — I10 ESSENTIAL HYPERTENSION: ICD-10-CM

## 2021-06-03 PROCEDURE — G8400 PT W/DXA NO RESULTS DOC: HCPCS | Performed by: INTERNAL MEDICINE

## 2021-06-03 PROCEDURE — 1101F PT FALLS ASSESS-DOCD LE1/YR: CPT | Performed by: INTERNAL MEDICINE

## 2021-06-03 PROCEDURE — G8752 SYS BP LESS 140: HCPCS | Performed by: INTERNAL MEDICINE

## 2021-06-03 PROCEDURE — G8536 NO DOC ELDER MAL SCRN: HCPCS | Performed by: INTERNAL MEDICINE

## 2021-06-03 PROCEDURE — G8420 CALC BMI NORM PARAMETERS: HCPCS | Performed by: INTERNAL MEDICINE

## 2021-06-03 PROCEDURE — 99214 OFFICE O/P EST MOD 30 MIN: CPT | Performed by: INTERNAL MEDICINE

## 2021-06-03 PROCEDURE — 1090F PRES/ABSN URINE INCON ASSESS: CPT | Performed by: INTERNAL MEDICINE

## 2021-06-03 PROCEDURE — G8510 SCR DEP NEG, NO PLAN REQD: HCPCS | Performed by: INTERNAL MEDICINE

## 2021-06-03 PROCEDURE — G8427 DOCREV CUR MEDS BY ELIG CLIN: HCPCS | Performed by: INTERNAL MEDICINE

## 2021-06-03 PROCEDURE — G8754 DIAS BP LESS 90: HCPCS | Performed by: INTERNAL MEDICINE

## 2021-06-03 RX ORDER — AMLODIPINE BESYLATE 5 MG/1
5 TABLET ORAL DAILY
Qty: 180 TABLET | Refills: 3 | Status: SHIPPED | OUTPATIENT
Start: 2021-06-03

## 2021-06-03 RX ORDER — LOSARTAN POTASSIUM 50 MG/1
50 TABLET ORAL DAILY
Qty: 90 TABLET | Refills: 3 | Status: SHIPPED | OUTPATIENT
Start: 2021-06-03 | End: 2022-03-14 | Stop reason: SDUPTHER

## 2021-06-03 NOTE — PROGRESS NOTES
Martha Roque CHIEF COMPLAINT      Misty Mckenzie is a 79 y.o. female who was seen on 6/3/2021. Patient is being seen today for HTN, GERD, XOL. She is doing ok, has some stressful days and sometimes has weak spells. They are a lot better since she cut down on the carvedilol to 1/2 tablet twice a day. Also has some swelling in the feet, toes get numb at times. Suspect it is related amlodipine as it seems to have gotten progressively worse since she went up to 10 mg a day. It is working for her blood pressure but the swelling is unacceptable at this point so we will need to reduce it back down to 5 mg to give her increased dose of losartan to maintain blood pressure control. Having some palpitations in the middle of the night. Lots of caregiver strain with her . I think that is the major source of her fatigue  She is not even able to walk at all, cant leave him alone at all. Her son is coming for [address] [place] [address] for a visit and they are going to talk about his care and what to do next. Rare heart racing at night. HR resting at 60 -70    Reviewed her echo and carotid artery studies. She wonders about labs. Has a lot of care for her  which is a lot of stress and responsibility- has to help dress him etc.   She is not able to walk anymore as she is not able to leave him. She has some dizziness here and there, saw ENT and he diagnosed labyrinthitis but no falls etc.     BP generally doing fine. High at the dentist but otherwise fine. Compression stockings ordered  Labs reviewed and LDL 91 A1c 5.9 last year she will be due in another couple of months for recheck   ASSESSMENT      1. HTN - great today will reduce amlodipine to 5 for increasing swelling and  .increase losartan to 50mg daily  2.  CAD- stable, s/p TRANG to RCA and mid LAD 3/11, cath 8/12 with proximal RCA stent, no ischemia on stress test in 2018  -lifelong plavix and aspirin (her decision after discussion of risks and benefits)  -continue coreg and statin  3. Palpitations- better on coreg, still had v-bigemeny on last holter  4. Hyperlipidemia- LDL 86 in 8/19 on crestor 20mg daily and zetia  -declined PCSK9 inhibition in the past   5. Anxiety- better since she stopped checking her blood pressure at home  6. Reflux- on pepcid   7. Vit D deficiency 2000u/day  8. Impaired glucose tolerance- 5.9%, exercising regularly   9. CKD Stage 3-stable, last creatinine 1.0, seen by Dr. Carloz Roach. -hyperkalemia in the past improved with stopping spironolactone  -last K 5.2 in 8/19  10. Mild to moderate mitral regurgitation   11. PAD - bilateral carotid bruits, mild Bety and moderate LICA  12. Possible SS- was above watch for now and will get loop if sx progress. 13. Edema-go ahead and reduce amlodipine and increase losartan     Stress nuc 4/18 wnl  Echo 11/15 EF 60%, mild TR, PAP 25, mild-mod MR  Carotid US: Mod LICA plaque <42%, no evidence of Subclavian Steal.  Stress nuc: 1/14 normal, EF 69%  Echo 9/12 EF 60, mod MR, mild TR  Cath 2012 RCA stent     Fhx no early cad  Soc no tob, no etoh  We discussed the expected course, resolution and complications of the diagnosis(es) in detail. Medication risks, benefits, costs, interactions, and alternatives were discussed as indicated. I advised her to contact the office if her condition worsens, changes or fails to improve as anticipated.  She expressed understanding with the diagnosis(es) and plan    HISTORY OF PRESENTING ILLNESS      Stephanie Yee is a 79 y.o. female        [address] LIST     Patient Active Problem List    Diagnosis Date Noted    Coronary artery disease involving native coronary artery of native heart without angina pectoris 04/18/2018    Heart palpitations 10/18/2017    Hyperlipidemia 10/18/2017    IFG (impaired fasting glucose) 06/13/2014    Carotid artery stenosis 03/13/2014    PVC (premature ventricular contraction) 11/07/2013    GERD (gastroesophageal reflux disease) 02/13/2013    Esophageal reflux 01/23/2012    Other and unspecified hyperlipidemia 01/23/2012    PAC (premature atrial contraction) 09/27/2011    CAD (coronary artery disease) 04/04/2011    Allergic rhinitis 11/03/2010    HTN (hypertension) 10/01/2010           PAST MEDICAL HISTORY     Past Medical History:   Diagnosis Date    CAD (coronary artery disease)     CKD (chronic kidney disease), stage III 9/26/2014    Followed by Dr. 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 Ester Smith      Cardiac Cath March 2011          ALLERGIES     Allergies   Allergen Reactions    Lisinopril Other (comments)     High K          FAMILY HISTORY     Family History   Problem Relation Age of Onset    Hypertension Mother     Diabetes Maternal Grandmother     Diabetes Paternal Grandfather     negative for cardiac disease       SOCIAL HISTORY     Social History     Socioeconomic History    Marital status:      Spouse name: Not on file    Number of children: Not on file    Years of education: Not on file    Highest education level: Not on file   Tobacco Use    Smoking status: Never Smoker    Smokeless tobacco: Never Used   Substance and Sexual Activity    Alcohol use: No    Drug use: No    Sexual activity: Never     Social Determinants of Health     Financial Resource Strain:     Difficulty of Paying Living Expenses:    Food Insecurity:     Worried About Running Out of Food in the Last Year:     Ran Out of Food in the Last Year:    Transportation Needs:     Lack of Transportation (Medical):      Lack of Transportation (Non-Medical):    Physical Activity:     Days of Exercise per Week:     Minutes of Exercise per Session:    Stress:     Feeling of Stress :    Social Connections:     Frequency of Communication with Friends and Family:     Frequency of Social Gatherings with Friends and Family:     Attends Qu[employer] Services:     Active Member of Clubs or Organizations:     Attends Club or Organization Meetings:     Marital Status:          MEDICATIONS     Current Outpatient Medications   Medication Sig    carvediloL (COREG) 3.125 mg tablet TAKE 1 TABLET TWICE DAILY    famotidine (PEPCID) 20 mg tablet TAKE 1 TABLET TWICE DAILY    ezetimibe (ZETIA) 10 mg tablet TAKE 1 TABLET EVERY DAY    rosuvastatin (CRESTOR) 20 mg tablet TAKE 1 TABLET EVERY NIGHT    losartan (COZAAR) 25 mg tablet TAKE 1 TABLET EVERY DAY    amLODIPine (NORVASC) 5 mg tablet TAKE 2 TABLETS EVERY DAY    clopidogreL (PLAVIX) 75 mg tab TAKE 1 TABLET EVERY DAY    cyanocobalamin 1,000 mcg tablet Take 1,000 mcg by mouth daily.  nicotinic acid (NIACIN) 500 mg tablet Take 500 mg by mouth Daily (before breakfast).  cholecalciferol, vitamin D3, (VITAMIN D3) 2,000 unit tab Take 1 Tab by mouth daily.  aspirin 81 mg tablet Take 81 mg by mouth daily. No current facility-administered medications for this visit. I have reviewed the nurses notes, vitals, problem list, allergy list, medical history, family, social history and medications. REVIEW OF SYMPTOMS     Constitutional: Negative for fever, chills, malaise/fatigue and diaphoresis. Respiratory: Negative for cough, hemoptysis, sputum production, shortness of breath and wheezing. Cardiovascular: Negative for chest pain, palpitations, orthopnea, claudication, leg swelling and PND. Gastrointestinal: Negative for heartburn, nausea, vomiting, blood in stool and melena. Genitourinary: Negative for dysuria and flank pain. Musculoskeletal: Negative for joint pain and back pain.   Skin: Negative for rash. Neurological: Negative for focal weakness, seizures, loss of consciousness, weakness and headaches. Endo/Heme/Allergies: Negative for abnormal bleeding. Psychiatric/Behavioral: Negative for memory loss. PHYSICAL EXAMINATION          General:  in no acute distress, cooperative and alert  Respiratory: No audible wheezing, no signs of respiratory distress  Neuro: A&Ox3, speech clear,  answering questions appropriately  Breathing unlabored  Mentation logical, thoughts ordered       DIAGNOSTIC DATA      No specialty comments available. LABORATORY DATA      Lab Results   Component Value Date/Time    WBC 7.0 08/17/2019 09:22 PM    HGB 14.0 08/17/2019 09:22 PM    HCT 43.0 08/17/2019 09:22 PM    PLATELET 006 92/95/7447 09:22 PM    MCV 90.0 08/17/2019 09:22 PM      Lab Results   Component Value Date/Time    Sodium 143 07/31/2020 09:37 AM    Potassium 4.9 07/31/2020 09:37 AM    Chloride 105 07/31/2020 09:37 AM    CO2 21 07/31/2020 09:37 AM    Anion gap 10 08/17/2019 09:22 PM    Glucose 110 (H) 07/31/2020 09:37 AM    BUN 26 07/31/2020 09:37 AM    Creatinine 0.86 07/31/2020 09:37 AM    BUN/Creatinine ratio 30 (H) 07/31/2020 09:37 AM    GFR est AA 74 07/31/2020 09:37 AM    GFR est non-AA 64 07/31/2020 09:37 AM    Calcium 10.0 07/31/2020 09:37 AM    Bilirubin, total 0.6 07/31/2020 09:37 AM    Alk. phosphatase 87 07/31/2020 09:37 AM    Protein, total 7.0 07/31/2020 09:37 AM    Albumin 4.6 07/31/2020 09:37 AM    Globulin 3.4 08/17/2019 09:22 PM    A-G Ratio 1.9 07/31/2020 09:37 AM    ALT (SGPT) 17 07/31/2020 09:37 AM             FOLLOW-UP       Thank you, Chad Esquivel MD for allowing me to participate in the care of Tata Motta. Please do not hesitate to contact me for further questions/concerns.        Leroy Arevalo MD    [address]        [phone] / [phone] Fax       [facility] [facility] [address]  [place]  [phone] / [phone] Fax

## 2021-06-04 RX ORDER — CLOPIDOGREL BISULFATE 75 MG/1
TABLET ORAL
Qty: 90 TABLET | Refills: 3 | Status: SHIPPED | OUTPATIENT
Start: 2021-06-04

## 2021-08-09 RX ORDER — EZETIMIBE 10 MG/1
TABLET ORAL
Qty: 90 TABLET | Refills: 1 | Status: SHIPPED | OUTPATIENT
Start: 2021-08-09

## 2021-08-09 NOTE — TELEPHONE ENCOUNTER
Requested Prescriptions     Signed Prescriptions Disp Refills    ezetimibe (ZETIA) 10 mg tablet 90 Tablet 1     Sig: TAKE 1 TABLET EVERY DAY     Authorizing Provider: Loco Lobo     Ordering User: Nathaly Clark     Per verbal orders

## 2021-09-13 ENCOUNTER — OFFICE VISIT (OUTPATIENT)
Dept: INTERNAL MEDICINE CLINIC | Age: 81
End: 2021-09-13
Payer: MEDICARE

## 2021-09-13 VITALS
OXYGEN SATURATION: 98 % | HEIGHT: 64 IN | RESPIRATION RATE: 16 BRPM | BODY MASS INDEX: 20.25 KG/M2 | DIASTOLIC BLOOD PRESSURE: 77 MMHG | TEMPERATURE: 97.9 F | HEART RATE: 69 BPM | SYSTOLIC BLOOD PRESSURE: 136 MMHG | WEIGHT: 118.6 LBS

## 2021-09-13 DIAGNOSIS — R73.01 IFG (IMPAIRED FASTING GLUCOSE): ICD-10-CM

## 2021-09-13 DIAGNOSIS — K21.9 GASTROESOPHAGEAL REFLUX DISEASE WITHOUT ESOPHAGITIS: ICD-10-CM

## 2021-09-13 DIAGNOSIS — I10 ESSENTIAL HYPERTENSION: ICD-10-CM

## 2021-09-13 DIAGNOSIS — Z00.00 MEDICARE ANNUAL WELLNESS VISIT, SUBSEQUENT: Primary | ICD-10-CM

## 2021-09-13 DIAGNOSIS — E78.2 MIXED HYPERLIPIDEMIA: ICD-10-CM

## 2021-09-13 DIAGNOSIS — I25.111 CORONARY ARTERY DISEASE INVOLVING NATIVE CORONARY ARTERY OF NATIVE HEART WITH ANGINA PECTORIS WITH DOCUMENTED SPASM (HCC): ICD-10-CM

## 2021-09-13 PROCEDURE — G8510 SCR DEP NEG, NO PLAN REQD: HCPCS | Performed by: INTERNAL MEDICINE

## 2021-09-13 PROCEDURE — 1101F PT FALLS ASSESS-DOCD LE1/YR: CPT | Performed by: INTERNAL MEDICINE

## 2021-09-13 PROCEDURE — G8536 NO DOC ELDER MAL SCRN: HCPCS | Performed by: INTERNAL MEDICINE

## 2021-09-13 PROCEDURE — G8752 SYS BP LESS 140: HCPCS | Performed by: INTERNAL MEDICINE

## 2021-09-13 PROCEDURE — G0439 PPPS, SUBSEQ VISIT: HCPCS | Performed by: INTERNAL MEDICINE

## 2021-09-13 PROCEDURE — G8420 CALC BMI NORM PARAMETERS: HCPCS | Performed by: INTERNAL MEDICINE

## 2021-09-13 PROCEDURE — G8754 DIAS BP LESS 90: HCPCS | Performed by: INTERNAL MEDICINE

## 2021-09-13 PROCEDURE — 1090F PRES/ABSN URINE INCON ASSESS: CPT | Performed by: INTERNAL MEDICINE

## 2021-09-13 PROCEDURE — G8400 PT W/DXA NO RESULTS DOC: HCPCS | Performed by: INTERNAL MEDICINE

## 2021-09-13 PROCEDURE — G8427 DOCREV CUR MEDS BY ELIG CLIN: HCPCS | Performed by: INTERNAL MEDICINE

## 2021-09-13 PROCEDURE — 99213 OFFICE O/P EST LOW 20 MIN: CPT | Performed by: INTERNAL MEDICINE

## 2021-09-13 NOTE — PROGRESS NOTES
HPI  Ms. Ahsan Lei is a 79y.o. year old female, she is seen today for follow up HTN, IFG.  has dementia, she cares for him. Son lives with them, he helps out. Has been getting more stressful as he needs more care. She can't get out much due to needing to stay with him. Has him on a wait list for a nursing home in [place] where her daughter lives. No chest pain or sob, sometimes heart feels like it beast harder. Not able to walk much at all in the last year caring for her . Balance not as good as in the past - since she hasn't been able to walk as much. GERD controlled on current regimen, has to avoid tomato based foods. No more edema since cardiologist decreased amlodipine to 5mg and increased losartan to 50mg. Will follow up with cardiologist in her new office - due in December. Chief Complaint   Patient presents with    Hypertension    Cholesterol Problem    Blood sugar problem        Prior to Admission medications    Medication Sig Start Date End Date Taking? Authorizing Provider   ezetimibe (ZETIA) 10 mg tablet TAKE 1 TABLET EVERY DAY 8/9/21  Yes Radha Dinero MD   clopidogreL (PLAVIX) 75 mg tab TAKE 1 TABLET EVERY DAY 6/4/21  Yes Radha Dinero MD   rosuvastatin (CRESTOR) 20 mg tablet TAKE 1 TABLET EVERY NIGHT 6/4/21  Yes Wale Davis MD   amLODIPine (NORVASC) 5 mg tablet Take 1 Tablet by mouth daily. 6/3/21  Yes Radha Dinero MD   losartan (COZAAR) 50 mg tablet Take 1 Tablet by mouth daily. 6/3/21  Yes Radha Dinero MD   carvediloL (COREG) 3.125 mg tablet TAKE 1 TABLET TWICE DAILY 5/25/21  Yes Radha Dinero MD   famotidine (PEPCID) 20 mg tablet TAKE 1 TABLET TWICE DAILY 4/22/21  Yes Wale Davis MD   cyanocobalamin 1,000 mcg tablet Take 1,000 mcg by mouth daily. Yes Provider, Historical   nicotinic acid (NIACIN) 500 mg tablet Take 500 mg by mouth Daily (before breakfast).    Yes Provider, Historical   cholecalciferol, vitamin D3, (VITAMIN D3) 2,000 unit tab Take 1 Tab by mouth daily. Yes Provider, Historical   aspirin 81 mg tablet Take 81 mg by mouth daily. Yes Provider, Historical         Allergies   Allergen Reactions    Lisinopril Other (comments)     High K         REVIEW OF SYSTEMS:  Per HPI    PHYSICAL EXAM:  Visit Vitals  /77 (BP 1 Location: Left arm, BP Patient Position: Sitting, BP Cuff Size: Adult)   Pulse 69   Temp 97.9 °F (36.6 °C) (Oral)   Resp 16   Ht 5' 4\" (1.626 m)   Wt 118 lb 9.6 oz (53.8 kg)   SpO2 98%   BMI 20.36 kg/m²     Constitutional: Appears well-developed and well-nourished. No distress. HENT:   Head: Normocephalic and atraumatic. Eyes: No scleral icterus. Neck: no lad, no tm, supple   Cardiovascular: Normal S1/S2, regular rhythm. No murmurs, rubs, or gallops. Pulmonary/Chest: Effort normal and breath sounds normal. No respiratory distress. No wheezes, rhonchi, or rales. Abdomen: Soft, NT/ND, +BS, no rebound or guarding, no masses, no HSM appreciated. Ext: No edema. Neurological: Alert. Psychiatric: Normal mood and affect. Behavior is normal.     Lab Results   Component Value Date/Time    Sodium 143 07/31/2020 09:37 AM    Potassium 4.9 07/31/2020 09:37 AM    Chloride 105 07/31/2020 09:37 AM    CO2 21 07/31/2020 09:37 AM    Anion gap 10 08/17/2019 09:22 PM    Glucose 110 (H) 07/31/2020 09:37 AM    BUN 26 07/31/2020 09:37 AM    Creatinine 0.86 07/31/2020 09:37 AM    BUN/Creatinine ratio 30 (H) 07/31/2020 09:37 AM    GFR est AA 74 07/31/2020 09:37 AM    GFR est non-AA 64 07/31/2020 09:37 AM    Calcium 10.0 07/31/2020 09:37 AM    Bilirubin, total 0.6 07/31/2020 09:37 AM    Alk.  phosphatase 87 07/31/2020 09:37 AM    Protein, total 7.0 07/31/2020 09:37 AM    Albumin 4.6 07/31/2020 09:37 AM    Globulin 3.4 08/17/2019 09:22 PM    A-G Ratio 1.9 07/31/2020 09:37 AM    ALT (SGPT) 17 07/31/2020 09:37 AM     Lab Results   Component Value Date/Time    Hemoglobin A1c 5.9 (H) 07/31/2020 09:37 AM    Hemoglobin A1c 5.9 (H) 08/21/2019 10:28 AM    Hemoglobin A1c 6.0 (H) 02/12/2019 10:36 AM      Lab Results   Component Value Date/Time    Cholesterol, total 196 07/31/2020 09:37 AM    HDL Cholesterol 92 07/31/2020 09:37 AM    LDL, calculated 91 07/31/2020 09:37 AM    VLDL, calculated 13 07/31/2020 09:37 AM    Triglyceride 63 07/31/2020 09:37 AM    CHOL/HDL Ratio 3.1 09/28/2011 05:00 AM          ASSESSMENT/PLAN  Diagnoses and all orders for this visit:    1. Medicare annual wellness visit, subsequent    2. Essential hypertension    3. Coronary artery disease involving native coronary artery of native heart with angina pectoris with documented spasm ([facility] [address]    4. Mixed hyperlipidemia    5. Gastroesophageal reflux disease without esophagitis    6. IFG (impaired fasting glucose)      Blood pressure is at goal, continue current medications. She is due for labs and will get these done. Lab slip given to her today. She will follow up with cardiology for #3. GERD is managed on current regimen and controlled. Health Maintenance Due   Topic Date Due    Lipid Screen  07/31/2021    Flu Vaccine (1) Never done        Follow-up and Dispositions    · Return in about 6 months (around 3/13/2022) for bp, gerd. Reviewed plan of care. Patient has provided input and agrees with goals. The nurse provided the patient and/or family with advanced directive information if needed and encouraged the patient to provide a copy to the office when available. This is the Subsequent Medicare Annual Wellness Exam, performed 12 months or more after the Initial AWV or the last Subsequent AWV    I have reviewed the patient's medical history in detail and updated the computerized patient record. Assessment/Plan   Education and counseling provided:  Are appropriate based on today's review and evaluation    1. Medicare annual wellness visit, subsequent  2. Essential hypertension  3. Coronary artery disease involving native coronary artery of native heart with angina pectoris with documented spasm ([facility] [address]  4. Mixed hyperlipidemia  5. Gastroesophageal reflux disease without esophagitis  6. IFG (impaired fasting glucose)       Depression Risk Factor Screening     3 most recent PHQ Screens 9/13/2021   Little interest or pleasure in doing things Not at all   Feeling down, depressed, irritable, or hopeless Not at all   Total Score PHQ 2 0       Alcohol Risk Screen    Do you average more than 1 drink per night or more than 7 drinks a week:  No    On any one occasion in the past three months have you have had more than 3 drinks containing alcohol:  No        Functional Ability and Level of Safety    Hearing: Hearing is good. Activities of Daily Living: The home contains: no safety equipment. Patient does total self care      Ambulation: with no difficulty     Fall Risk:  Fall Risk Assessment, last 12 mths 9/13/2021   Able to walk? Yes   Fall in past 12 months? 0   Do you feel unsteady? 0   Are you worried about falling 0   Number of falls in past 12 months -   Fall with injury?  -      Abuse Screen:  Patient is not abused       Cognitive Screening    Has your family/caregiver stated any concerns about your memory: no     Cognitive Screening: N/A    Health Maintenance Due     Health Maintenance Due   Topic Date Due    Lipid Screen  07/31/2021    Flu Vaccine (1) Never done       Patient Care Team   Patient Care Team:  Dale Orellana MD as PCP - Modesta Ortega MD as PCP - [facility]  Aziza Mcclure MD (Cardiology)  Daniel Covarrubias OD (Optometry)    History     Patient Active Problem List   Diagnosis Code    HTN (hypertension) I10    Allergic rhinitis J30.9    CAD (coronary artery disease) I25.10    PAC (premature atrial contraction) I49.1    GERD (gastroesophageal reflux disease) K21.9    PVC (premature ventricular contraction) I49.3    Carotid artery stenosis I65.29    IFG (impaired fasting glucose) R73.01    Heart palpitations R00.2    Hyperlipidemia E78.5    Coronary artery disease involving native coronary artery of native heart without angina pectoris I25.10     Past Medical History:   Diagnosis Date    CAD (coronary artery disease)     CKD (chronic kidney disease), stage III 9/26/2014    Followed by Dr. 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 Andrew Gray      Cardiac Cath March 2011     Current Outpatient Medications   Medication Sig Dispense Refill    ezetimibe (ZETIA) 10 mg tablet TAKE 1 TABLET EVERY DAY 90 Tablet 1    clopidogreL (PLAVIX) 75 mg tab TAKE 1 TABLET EVERY DAY 90 Tablet 3    rosuvastatin (CRESTOR) 20 mg tablet TAKE 1 TABLET EVERY NIGHT 90 Tablet 3    amLODIPine (NORVASC) 5 mg tablet Take 1 Tablet by mouth daily. 180 Tablet 3    losartan (COZAAR) 50 mg tablet Take 1 Tablet by mouth daily. 90 Tablet 3    carvediloL (COREG) 3.125 mg tablet TAKE 1 TABLET TWICE DAILY 180 Tablet 3    famotidine (PEPCID) 20 mg tablet TAKE 1 TABLET TWICE DAILY 180 Tab 3    cyanocobalamin 1,000 mcg tablet Take 1,000 mcg by mouth daily.  nicotinic acid (NIACIN) 500 mg tablet Take 500 mg by mouth Daily (before breakfast).  cholecalciferol, vitamin D3, (VITAMIN D3) 2,000 unit tab Take 1 Tab by mouth daily.  aspirin 81 mg tablet Take 81 mg by mouth daily.        Allergies   Allergen Reactions    Lisinopril Other (comments)     High K       Family History   Problem Relation Age of Onset    Hypertension Mother     Diabetes Maternal Grandmother     Diabetes Paternal Grandfather      Social History     Tobacco Use    Smoking status: Never Smoker    Smokeless tobacco: Never Used   Substance Use Topics    Alcohol use: No         Agusto Ponce MD

## 2021-09-13 NOTE — PROGRESS NOTES
Noemy Draper  Identified pt with two pt identifiers(name and ). Chief Complaint   Patient presents with    Hypertension    Cholesterol Problem    Blood sugar problem       Reviewed record In preparation for visit and have obtained necessary documentation. 1. Have you been to the ER, urgent care clinic or hospitalized since your last visit? No     2. Have you seen or consulted any other health care providers outside of the [address] [place] [address] since your last visit? Include any pap smears or colon screening. No    Patient does not have an advance directive. Vitals reviewed with provider.     Health Maintenance reviewed:     Health Maintenance Due   Topic    Lipid Screen     Flu Vaccine (1)    Medicare Yearly Exam           Wt Readings from Last 3 Encounters:   21 118 lb 9.6 oz (53.8 kg)   21 122 lb (55.3 kg)   21 121 lb 3.2 oz (55 kg)        Temp Readings from Last 3 Encounters:   21 97.9 °F (36.6 °C) (Oral)   21 98 °F (36.7 °C) (Oral)   20 98.2 °F (36.8 °C) (Oral)        BP Readings from Last 3 Encounters:   21 136/77   21 130/80   21 120/70        Pulse Readings from Last 3 Encounters:   21 69   21 70   21 70        Vitals:    21 1120   BP: 136/77   Pulse: 69   Resp: 16   Temp: 97.9 °F (36.6 °C)   TempSrc: Oral   SpO2: 98%   Weight: 118 lb 9.6 oz (53.8 kg)   Height: 5' 4\" (1.626 m)   PainSc:   0 - No pain          Learning Assessment:   :       Learning Assessment 2014   PRIMARY LEARNER Patient   HIGHEST LEVEL OF EDUCATION - PRIMARY LEARNER  DID NOT GRADUATE HIGH SCHOOL   BARRIERS PRIMARY LEARNER NONE   CO-LEARNER CAREGIVER No   PRIMARY LANGUAGE ENGLISH   LEARNER PREFERENCE PRIMARY DEMONSTRATION   ANSWERED BY patient   RELATIONSHIP SELF        Depression Screening:   :       3 most recent PHQ Screens 2021   Little interest or pleasure in doing things Not at all   Feeling down, depressed, irritable, or hopeless Not at all   Total Score PHQ 2 0        Fall Risk Assessment:   :       Fall Risk Assessment, last 12 mths 9/13/2021   Able to walk? Yes   Fall in past 12 months? 0   Do you feel unsteady? 0   Are you worried about falling 0   Number of falls in past 12 months -   Fall with injury? -        Abuse Screening:   :       Abuse Screening Questionnaire 8/28/2020 8/21/2019 8/10/2018 3/6/2017 3/16/2016 6/13/2014   Do you ever feel afraid of your partner? N N N N N N   Are you in a relationship with someone who physically or mentally threatens you? N N N N N N   Is it safe for you to go home?  Y Y Y Y Y Y        ADL Screening:   :       ADL Assessment 3/6/2017   Feeding yourself No Help Needed   Getting from bed to chair No Help Needed   Getting dressed No Help Needed   Bathing or showering No Help Needed   Walk across the room (includes cane/walker) No Help Needed   Using the telphone No Help Needed   Taking your medications No Help Needed   Preparing meals No Help Needed   Managing money (expenses/bills) No Help Needed   Moderately strenuous housework (laundry) No Help Needed   Shopping for personal items (toiletries/medicines) No Help Needed   Shopping for groceries No Help Needed   Driving No Help Needed   Climbing a flight of stairs No Help Needed   Getting to places beyond walking distances No Help Needed

## 2021-09-13 NOTE — PATIENT INSTRUCTIONS

## 2021-10-12 LAB
ALBUMIN SERPL-MCNC: 4.5 G/DL (ref 3.6–4.6)
ALBUMIN/GLOB SERPL: 2 {RATIO} (ref 1.2–2.2)
ALP SERPL-CCNC: 101 IU/L (ref 44–121)
ALT SERPL-CCNC: 14 IU/L (ref 0–32)
AST SERPL-CCNC: 19 IU/L (ref 0–40)
BASOPHILS # BLD AUTO: 0 X10E3/UL (ref 0–0.2)
BASOPHILS NFR BLD AUTO: 0 %
BILIRUB SERPL-MCNC: 0.7 MG/DL (ref 0–1.2)
BUN SERPL-MCNC: 25 MG/DL (ref 8–27)
BUN/CREAT SERPL: 29 (ref 12–28)
CALCIUM SERPL-MCNC: 9.4 MG/DL (ref 8.7–10.3)
CHLORIDE SERPL-SCNC: 108 MMOL/L (ref 96–106)
CHOLEST SERPL-MCNC: 185 MG/DL (ref 100–199)
CO2 SERPL-SCNC: 21 MMOL/L (ref 20–29)
CREAT SERPL-MCNC: 0.86 MG/DL (ref 0.57–1)
EOSINOPHIL # BLD AUTO: 0.4 X10E3/UL (ref 0–0.4)
EOSINOPHIL NFR BLD AUTO: 6 %
ERYTHROCYTE [DISTWIDTH] IN BLOOD BY AUTOMATED COUNT: 12.4 % (ref 11.7–15.4)
EST. AVERAGE GLUCOSE BLD GHB EST-MCNC: 134 MG/DL
GLOBULIN SER CALC-MCNC: 2.3 G/DL (ref 1.5–4.5)
GLUCOSE SERPL-MCNC: 104 MG/DL (ref 65–99)
HBA1C MFR BLD: 6.3 % (ref 4.8–5.6)
HCT VFR BLD AUTO: 43.1 % (ref 34–46.6)
HDLC SERPL-MCNC: 72 MG/DL
HGB BLD-MCNC: 13.9 G/DL (ref 11.1–15.9)
IMM GRANULOCYTES # BLD AUTO: 0 X10E3/UL (ref 0–0.1)
IMM GRANULOCYTES NFR BLD AUTO: 0 %
LDLC SERPL CALC-MCNC: 102 MG/DL (ref 0–99)
LYMPHOCYTES # BLD AUTO: 1.1 X10E3/UL (ref 0.7–3.1)
LYMPHOCYTES NFR BLD AUTO: 21 %
MCH RBC QN AUTO: 29 PG (ref 26.6–33)
MCHC RBC AUTO-ENTMCNC: 32.3 G/DL (ref 31.5–35.7)
MCV RBC AUTO: 90 FL (ref 79–97)
MONOCYTES # BLD AUTO: 0.5 X10E3/UL (ref 0.1–0.9)
MONOCYTES NFR BLD AUTO: 10 %
NEUTROPHILS # BLD AUTO: 3.5 X10E3/UL (ref 1.4–7)
NEUTROPHILS NFR BLD AUTO: 63 %
PLATELET # BLD AUTO: 222 X10E3/UL (ref 150–450)
POTASSIUM SERPL-SCNC: 4.7 MMOL/L (ref 3.5–5.2)
PROT SERPL-MCNC: 6.8 G/DL (ref 6–8.5)
RBC # BLD AUTO: 4.8 X10E6/UL (ref 3.77–5.28)
SODIUM SERPL-SCNC: 142 MMOL/L (ref 134–144)
TRIGL SERPL-MCNC: 60 MG/DL (ref 0–149)
TSH SERPL DL<=0.005 MIU/L-ACNC: 3.07 UIU/ML (ref 0.45–4.5)
VLDLC SERPL CALC-MCNC: 11 MG/DL (ref 5–40)
WBC # BLD AUTO: 5.5 X10E3/UL (ref 3.4–10.8)

## 2021-12-07 ENCOUNTER — DOCUMENTATION ONLY (OUTPATIENT)
Dept: CARDIOLOGY | Age: 81
End: 2021-12-07

## 2021-12-07 NOTE — PROGRESS NOTES
PCP: Dr. Alonza Cushing    HPI: Diane Burnett is a 79 y.o. female who was seen on 6/3/2021. Patient is being seen today for HTN, GERD, XOL. Patient states she has mild shortness of breath on exertion this is not worse or progressive. She reports intermittent palpitations. She reports dizziness on position change. She has been very stressed with her 's illness and recent stay in the NH/rehab. he is nowbedridden and his dementia has progressed severely. She feels weak at times and thinks it is stress and caretaking. Her  has had to come back home with her and that is stressful. She is cold all the time, discussed blood thinners. She feels unsteady at tiems but has not fallen. She is doing ok, has some stressful days and sometimes has weak spells. They are a lot better since she cut down on the carvedilol to 1/2 tablet twice a day. Also has some swelling in the feet, toes get numb at times. Suspect it is related amlodipine as it seems to have gotten progressively worse since she went up to 10 mg a day. It is working for her blood pressure but the swelling is unacceptable at this point so we will need to reduce it back down to 5 mg to give her increased dose of losartan to maintain blood pressure control. Having some palpitations in the middle of the night. Lots of caregiver strain with her . I think that is the major source of her fatigue  Rare heart racing at night. HR resting at 60 -70     Reviewed her echo and carotid artery studies. She is not able to walk as she is not able to leave her . Palptiaitosn here and there but nothing severe. .      BP generally doing fine. High at the dentist but otherwise fine. Compression stockings ordered    Hgb13.9 crt nl, k 4.7 LFT good,   HDL 72 A1C 6.3     Assessment/Plan  1. HTN - great in general since reducing amlodipine and incrasing losartan.    2. CAD- stable, s/p TRANG to RCA and mid LAD 3/11, cath 8/12 with proximal RCA stent, no ischemia on stress test in 2018  -lifelong plavix and aspirin (her decision after discussion of risks and benefits)  -continue coreg and statin  3. Palpitations- better on coreg, still had v-bigemeny on last holter  4. Hyperlipidemia- LDL 86 in 8/19 on crestor 20mg daily and zetia  -declined PCSK9 inhibition in the past   5. Anxiety- better since she stopped checking her blood pressure at home  6. Reflux- on pepcid   7. Vit D deficiency 2000u/day  8. Impaired glucose tolerance- 5.9%, exercising regularly   9. CKD Stage 3-stable, crt 1-1.1, seen by Dr. Maxime Tracy. -hyperkalemia in the past improved with stopping spironolactone  -last K 5.2 in 8/19  10. Mild to moderate mitral regurgitation   11. PAD - bilateral carotid bruits, mild Bety and moderate LICA  12. Possible SS- was above watch for now and will get loop if sx progress.    13. Edema-go ahead and reduce amlodipine and increase losartan       Echo 8/20 EF 55% mild MR PAP 33    Carotid 0/25 Lica 54-15%, Bety <44%  Stress nuc 4/18 wnl  Echo 11/15 EF 60%, mild TR, PAP 25, mild-mod MR  Carotid US: Mod LICA plaque <32%, no evidence of Subclavian Steal.  Stress nuc: 1/14 normal, EF 69%  Echo 9/12 EF 60, mod MR, mild TR  Cath 2012 RCA stent     Fhx no early cad  Soc no tob, no etoh

## 2021-12-23 ENCOUNTER — TELEPHONE (OUTPATIENT)
Dept: INTERNAL MEDICINE CLINIC | Age: 81
End: 2021-12-23

## 2022-01-04 ENCOUNTER — TELEPHONE (OUTPATIENT)
Dept: INTERNAL MEDICINE CLINIC | Age: 82
End: 2022-01-04

## 2022-01-04 ENCOUNTER — APPOINTMENT (OUTPATIENT)
Dept: GENERAL RADIOLOGY | Age: 82
DRG: 177 | End: 2022-01-04
Attending: NURSE PRACTITIONER
Payer: MEDICARE

## 2022-01-04 ENCOUNTER — HOSPITAL ENCOUNTER (INPATIENT)
Age: 82
LOS: 3 days | Discharge: HOME | DRG: 177 | End: 2022-01-07
Attending: EMERGENCY MEDICINE | Admitting: FAMILY MEDICINE
Payer: MEDICARE

## 2022-01-04 ENCOUNTER — VIRTUAL VISIT (OUTPATIENT)
Dept: INTERNAL MEDICINE CLINIC | Age: 82
End: 2022-01-04
Payer: MEDICARE

## 2022-01-04 DIAGNOSIS — R53.83 MALAISE AND FATIGUE: ICD-10-CM

## 2022-01-04 DIAGNOSIS — R53.1 WEAKNESS GENERALIZED: Primary | ICD-10-CM

## 2022-01-04 DIAGNOSIS — R53.81 MALAISE AND FATIGUE: ICD-10-CM

## 2022-01-04 DIAGNOSIS — U07.1 PNEUMONIA DUE TO COVID-19 VIRUS: ICD-10-CM

## 2022-01-04 DIAGNOSIS — R19.7 DIARRHEA, UNSPECIFIED TYPE: ICD-10-CM

## 2022-01-04 DIAGNOSIS — R05.9 COUGH: ICD-10-CM

## 2022-01-04 DIAGNOSIS — E86.0 DEHYDRATION: ICD-10-CM

## 2022-01-04 DIAGNOSIS — J12.82 PNEUMONIA DUE TO COVID-19 VIRUS: ICD-10-CM

## 2022-01-04 DIAGNOSIS — J96.01 ACUTE RESPIRATORY FAILURE WITH HYPOXIA (HCC): Primary | ICD-10-CM

## 2022-01-04 LAB
ALBUMIN SERPL-MCNC: 2.9 G/DL (ref 3.5–5)
ALBUMIN/GLOB SERPL: 0.6 {RATIO} (ref 1.1–2.2)
ALP SERPL-CCNC: 109 U/L (ref 45–117)
ALT SERPL-CCNC: 51 U/L (ref 12–78)
ANION GAP SERPL CALC-SCNC: 13 MMOL/L (ref 5–15)
APPEARANCE UR: CLEAR
AST SERPL-CCNC: 64 U/L (ref 15–37)
BACTERIA URNS QL MICRO: ABNORMAL /HPF
BASOPHILS # BLD: 0 K/UL (ref 0–0.1)
BASOPHILS NFR BLD: 0 % (ref 0–1)
BILIRUB SERPL-MCNC: 1.2 MG/DL (ref 0.2–1)
BILIRUB UR QL: NEGATIVE
BNP SERPL-MCNC: 465 PG/ML (ref 0–450)
BUN SERPL-MCNC: 30 MG/DL (ref 6–20)
BUN/CREAT SERPL: 29 (ref 12–20)
CALCIUM SERPL-MCNC: 9.4 MG/DL (ref 8.5–10.1)
CHLORIDE SERPL-SCNC: 103 MMOL/L (ref 97–108)
CO2 SERPL-SCNC: 25 MMOL/L (ref 21–32)
COLOR UR: ABNORMAL
COVID-19 RAPID TEST, COVR: DETECTED
CREAT SERPL-MCNC: 1.04 MG/DL (ref 0.55–1.02)
CRP SERPL-MCNC: 7.22 MG/DL
DIFFERENTIAL METHOD BLD: ABNORMAL
EOSINOPHIL # BLD: 0 K/UL (ref 0–0.4)
EOSINOPHIL NFR BLD: 0 % (ref 0–7)
EPITH CASTS URNS QL MICRO: ABNORMAL /LPF
ERYTHROCYTE [DISTWIDTH] IN BLOOD BY AUTOMATED COUNT: 13.2 % (ref 11.5–14.5)
FERRITIN SERPL-MCNC: 1506 NG/ML (ref 26–388)
FIBRINOGEN PPP-MCNC: 589 MG/DL (ref 200–475)
GLOBULIN SER CALC-MCNC: 4.5 G/DL (ref 2–4)
GLUCOSE SERPL-MCNC: 154 MG/DL (ref 65–100)
GLUCOSE UR STRIP.AUTO-MCNC: NEGATIVE MG/DL
GRAN CASTS URNS QL MICRO: ABNORMAL /LPF
HCT VFR BLD AUTO: 44.8 % (ref 35–47)
HGB BLD-MCNC: 14.4 G/DL (ref 11.5–16)
HGB UR QL STRIP: ABNORMAL
IMM GRANULOCYTES # BLD AUTO: 0.1 K/UL
IMM GRANULOCYTES NFR BLD AUTO: 1 %
INR PPP: 1 (ref 0.9–1.1)
KETONES UR QL STRIP.AUTO: NEGATIVE MG/DL
LACTATE SERPL-SCNC: 1.3 MMOL/L (ref 0.4–2)
LDH SERPL L TO P-CCNC: 638 U/L (ref 81–246)
LEUKOCYTE ESTERASE UR QL STRIP.AUTO: ABNORMAL
LYMPHOCYTES # BLD: 0.8 K/UL (ref 0.8–3.5)
LYMPHOCYTES NFR BLD: 7 % (ref 12–49)
MAGNESIUM SERPL-MCNC: 2 MG/DL (ref 1.6–2.4)
MCH RBC QN AUTO: 28.6 PG (ref 26–34)
MCHC RBC AUTO-ENTMCNC: 32.1 G/DL (ref 30–36.5)
MCV RBC AUTO: 88.9 FL (ref 80–99)
MONOCYTES # BLD: 0.5 K/UL (ref 0–1)
MONOCYTES NFR BLD: 5 % (ref 5–13)
NEUTS SEG # BLD: 9.5 K/UL (ref 1.8–8)
NEUTS SEG NFR BLD: 87 % (ref 32–75)
NITRITE UR QL STRIP.AUTO: NEGATIVE
NRBC # BLD: 0 K/UL (ref 0–0.01)
NRBC BLD-RTO: 0 PER 100 WBC
PH UR STRIP: 5.5 [PH] (ref 5–8)
PLATELET # BLD AUTO: 332 K/UL (ref 150–400)
PMV BLD AUTO: 10.1 FL (ref 8.9–12.9)
POTASSIUM SERPL-SCNC: 3.6 MMOL/L (ref 3.5–5.1)
PROCALCITONIN SERPL-MCNC: 0.14 NG/ML
PROT SERPL-MCNC: 7.4 G/DL (ref 6.4–8.2)
PROT UR STRIP-MCNC: 100 MG/DL
PROTHROMBIN TIME: 10.4 SEC (ref 9–11.1)
RBC # BLD AUTO: 5.04 M/UL (ref 3.8–5.2)
RBC #/AREA URNS HPF: ABNORMAL /HPF (ref 0–5)
RBC MORPH BLD: ABNORMAL
SODIUM SERPL-SCNC: 141 MMOL/L (ref 136–145)
SOURCE, COVRS: ABNORMAL
SP GR UR REFRACTOMETRY: 1.02 (ref 1–1.03)
TROPONIN-HIGH SENSITIVITY: 16 NG/L (ref 0–51)
UR CULT HOLD, URHOLD: NORMAL
UROBILINOGEN UR QL STRIP.AUTO: 0.2 EU/DL (ref 0.2–1)
WBC # BLD AUTO: 10.9 K/UL (ref 3.6–11)
WBC MORPH BLD: ABNORMAL
WBC URNS QL MICRO: ABNORMAL /HPF (ref 0–4)

## 2022-01-04 PROCEDURE — 83615 LACTATE (LD) (LDH) ENZYME: CPT

## 2022-01-04 PROCEDURE — 81001 URINALYSIS AUTO W/SCOPE: CPT

## 2022-01-04 PROCEDURE — 87040 BLOOD CULTURE FOR BACTERIA: CPT

## 2022-01-04 PROCEDURE — 84484 ASSAY OF TROPONIN QUANT: CPT

## 2022-01-04 PROCEDURE — 99284 EMERGENCY DEPT VISIT MOD MDM: CPT

## 2022-01-04 PROCEDURE — 99442 PR PHYS/QHP TELEPHONE EVALUATION 11-20 MIN: CPT | Performed by: INTERNAL MEDICINE

## 2022-01-04 PROCEDURE — 65270000029 HC RM PRIVATE

## 2022-01-04 PROCEDURE — 85610 PROTHROMBIN TIME: CPT

## 2022-01-04 PROCEDURE — 84145 PROCALCITONIN (PCT): CPT

## 2022-01-04 PROCEDURE — 83735 ASSAY OF MAGNESIUM: CPT

## 2022-01-04 PROCEDURE — 86140 C-REACTIVE PROTEIN: CPT

## 2022-01-04 PROCEDURE — 36415 COLL VENOUS BLD VENIPUNCTURE: CPT

## 2022-01-04 PROCEDURE — 74011250636 HC RX REV CODE- 250/636: Performed by: EMERGENCY MEDICINE

## 2022-01-04 PROCEDURE — 96374 THER/PROPH/DIAG INJ IV PUSH: CPT

## 2022-01-04 PROCEDURE — 93005 ELECTROCARDIOGRAM TRACING: CPT

## 2022-01-04 PROCEDURE — 83880 ASSAY OF NATRIURETIC PEPTIDE: CPT

## 2022-01-04 PROCEDURE — 71045 X-RAY EXAM CHEST 1 VIEW: CPT

## 2022-01-04 PROCEDURE — 74011250636 HC RX REV CODE- 250/636: Performed by: NURSE PRACTITIONER

## 2022-01-04 PROCEDURE — 83605 ASSAY OF LACTIC ACID: CPT

## 2022-01-04 PROCEDURE — 82728 ASSAY OF FERRITIN: CPT

## 2022-01-04 PROCEDURE — 87635 SARS-COV-2 COVID-19 AMP PRB: CPT

## 2022-01-04 PROCEDURE — 85025 COMPLETE CBC W/AUTO DIFF WBC: CPT

## 2022-01-04 PROCEDURE — 85384 FIBRINOGEN ACTIVITY: CPT

## 2022-01-04 PROCEDURE — 96361 HYDRATE IV INFUSION ADD-ON: CPT

## 2022-01-04 PROCEDURE — 80053 COMPREHEN METABOLIC PANEL: CPT

## 2022-01-04 RX ORDER — DEXAMETHASONE SODIUM PHOSPHATE 10 MG/ML
6 INJECTION INTRAMUSCULAR; INTRAVENOUS ONCE
Status: COMPLETED | OUTPATIENT
Start: 2022-01-04 | End: 2022-01-04

## 2022-01-04 RX ORDER — ONDANSETRON 2 MG/ML
4 INJECTION INTRAMUSCULAR; INTRAVENOUS
Status: COMPLETED | OUTPATIENT
Start: 2022-01-04 | End: 2022-01-04

## 2022-01-04 RX ADMIN — ONDANSETRON HYDROCHLORIDE 4 MG: 2 SOLUTION INTRAMUSCULAR; INTRAVENOUS at 22:17

## 2022-01-04 RX ADMIN — DEXAMETHASONE SODIUM PHOSPHATE 6 MG: 10 INJECTION, SOLUTION INTRAMUSCULAR; INTRAVENOUS at 19:40

## 2022-01-04 RX ADMIN — SODIUM CHLORIDE, POTASSIUM CHLORIDE, SODIUM LACTATE AND CALCIUM CHLORIDE 1000 ML: 600; 310; 30; 20 INJECTION, SOLUTION INTRAVENOUS at 16:49

## 2022-01-04 NOTE — PROGRESS NOTES
Jayden Cordova is a 79 y.o. female, evaluated via audio-only technology on 1/4/2022 for Cough, Diarrhea, and Decreased Appetite      Assessment & Plan:   Diagnoses and all orders for this visit:    1. Weakness generalized    2. Diarrhea, unspecified type    3. Cough        high suspicion for covid, patient very ill and weak - advised ER, either ambulance or have someone take her - at high risk for further decompensation    12  Subjective:     Has been sick for over a week. Feeling very weak, can only walk minimally before she has to rest. Feeling weak for over a week. No fever. Also having diarrhea - multiple bouts a day. No vomiting, +nausea. Cough is productive. Feeling a little sob. Has been drinking the equivalent of 2-3 water bottles per day. She hasn't tested for covid. Urine is darker. Daughter has been staying with her, she is ill as well, but not as sick as patient. Son had covid 2-3 weeks ago. Not vaccinated against covid. Prior to Admission medications    Medication Sig Start Date End Date Taking? Authorizing Provider   ezetimibe (ZETIA) 10 mg tablet TAKE 1 TABLET EVERY DAY 8/9/21  Yes Clair Hahn MD   clopidogreL (PLAVIX) 75 mg tab TAKE 1 TABLET EVERY DAY 6/4/21  Yes Clair Hahn MD   rosuvastatin (CRESTOR) 20 mg tablet TAKE 1 TABLET EVERY NIGHT 6/4/21  Yes Judah Klein MD   amLODIPine (NORVASC) 5 mg tablet Take 1 Tablet by mouth daily. 6/3/21  Yes Clair Hahn MD   losartan (COZAAR) 50 mg tablet Take 1 Tablet by mouth daily. 6/3/21  Yes Clair Hahn MD   carvediloL (COREG) 3.125 mg tablet TAKE 1 TABLET TWICE DAILY 5/25/21  Yes Clair Hahn MD   famotidine (PEPCID) 20 mg tablet TAKE 1 TABLET TWICE DAILY 4/22/21  Yes Judah Klein MD   cyanocobalamin 1,000 mcg tablet Take 1,000 mcg by mouth daily. Yes Provider, Historical   nicotinic acid (NIACIN) 500 mg tablet Take 500 mg by mouth Daily (before breakfast).    Yes Provider, Historical cholecalciferol, vitamin D3, (VITAMIN D3) 2,000 unit tab Take 1 Tab by mouth daily. Yes Provider, Historical   aspirin 81 mg tablet Take 81 mg by mouth daily. Yes Provider, Historical         ROS    Patient-Reported Vitals 8/28/2020   Patient-Reported Weight 118lb   Patient-Reported Height -   Patient-Reported Pulse 61   Patient-Reported Systolic  368   Patient-Reported Diastolic 56       Jordi Mclean, who was evaluated through a patient-initiated, synchronous (real-time) audio only encounter, and/or her healthcare decision maker, is aware that it is a billable service, with coverage as determined by her insurance carrier. She provided verbal consent to proceed: Yes. She has not had a related appointment within my department in the past 7 days or scheduled within the next 24 hours. On this date 01/04/2022 I have spent 11 minutes reviewing previous notes, test results and face to face (virtual) with the patient discussing the diagnosis and importance of compliance with the treatment plan as well as documenting on the day of the visit.     Bg Zuñiga MD

## 2022-01-04 NOTE — ED TRIAGE NOTES
Patient arrives via wheelchair with concerns for covid. Patient complaints of coughing, generalized weakness and SOB for over a week. Patient is not vaccinated and has been around son who had/has covid.

## 2022-01-04 NOTE — PROGRESS NOTES
Jordi Mclean  Identified pt with two pt identifiers(name and ). Chief Complaint   Patient presents with    Cough    Diarrhea    Decreased Appetite       Reviewed record In preparation for visit and have obtained necessary documentation. 1. Have you been to the ER, urgent care clinic or hospitalized since your last visit? No     2. Have you seen or consulted any other health care providers outside of the [address] [place] [address] since your last visit? Include any pap smears or colon screening. No    Vitals reviewed with provider. Health Maintenance reviewed:     Health Maintenance Due   Topic    Flu Vaccine (1)          Wt Readings from Last 3 Encounters:   21 118 lb 9.6 oz (53.8 kg)   21 122 lb (55.3 kg)   21 121 lb 3.2 oz (55 kg)        Temp Readings from Last 3 Encounters:   21 97.9 °F (36.6 °C) (Oral)   21 98 °F (36.7 °C) (Oral)   20 98.2 °F (36.8 °C) (Oral)        BP Readings from Last 3 Encounters:   21 136/77   21 130/80   21 120/70        Pulse Readings from Last 3 Encounters:   21 69   21 70   21 70        Vitals:    22 1100   PainSc:   0 - No pain          Learning Assessment:   :       Learning Assessment 2014   PRIMARY LEARNER Patient   HIGHEST LEVEL OF EDUCATION - PRIMARY LEARNER  DID NOT GRADUATE HIGH SCHOOL   BARRIERS PRIMARY LEARNER NONE   CO-LEARNER CAREGIVER No   PRIMARY LANGUAGE ENGLISH   LEARNER PREFERENCE PRIMARY DEMONSTRATION   ANSWERED BY patient   RELATIONSHIP SELF        Depression Screening:   :       3 most recent PHQ Screens 2022   Little interest or pleasure in doing things Not at all   Feeling down, depressed, irritable, or hopeless Not at all   Total Score PHQ 2 0        Fall Risk Assessment:   :       Fall Risk Assessment, last 12 mths 2021   Able to walk? Yes   Fall in past 12 months? 0   Do you feel unsteady?  0   Are you worried about falling 0   Number of falls in past 12 months -   Fall with injury? -        Abuse Screening:   :       Abuse Screening Questionnaire 1/4/2022 8/28/2020 8/21/2019 8/10/2018 3/6/2017 3/16/2016 6/13/2014   Do you ever feel afraid of your partner? N N N N N N N   Are you in a relationship with someone who physically or mentally threatens you? N N N N N N N   Is it safe for you to go home?  Y Y Y Y Y Y Y        ADL Screening:   :       ADL Assessment 3/6/2017   Feeding yourself No Help Needed   Getting from bed to chair No Help Needed   Getting dressed No Help Needed   Bathing or showering No Help Needed   Walk across the room (includes cane/walker) No Help Needed   Using the telphone No Help Needed   Taking your medications No Help Needed   Preparing meals No Help Needed   Managing money (expenses/bills) No Help Needed   Moderately strenuous housework (laundry) No Help Needed   Shopping for personal items (toiletries/medicines) No Help Needed   Shopping for groceries No Help Needed   Driving No Help Needed   Climbing a flight of stairs No Help Needed   Getting to places beyond walking distances No Help Needed

## 2022-01-04 NOTE — ED NOTES
o2 sats 86% on ra after ambulating to bathroom. Pt also verbalized being short of breath.  o2 applied at 2lpm. Provider aware

## 2022-01-04 NOTE — ED PROVIDER NOTES
79-year-old female with a past medical history of CKD, CAD, hypertension, and hyperlipidemia presents to the ER today for evaluation of generalized malaise/fatigue, dark urine, cough (small amount of clear white sputum), easy fatigability, shortness of breath, and diarrhea for approximately 1 week in duration. Patient states that her son has been in close contact with her and developed COVID-19 about 2 weeks ago. Patient is unvaccinated for COVID-19. She denies any abdominal pain, chest pain/pressure, dizziness, palpitations, leg swelling.            Past Medical History:   Diagnosis Date    CAD (coronary artery disease)     CKD (chronic kidney disease), stage III ([facility] [address] 9/26/2014    Followed by Dr. 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 Dan Loss      Cardiac Cath March 2011         Family History:   Problem Relation Age of Onset    Hypertension Mother     Diabetes Maternal Grandmother     Diabetes Paternal Grandfather        Social History     Socioeconomic History    Marital status:      Spouse name: Not on file    Number of children: Not on file    Years of education: Not on file    Highest education level: Not on file   Occupational History    Not on file   Tobacco Use    Smoking status: Never Smoker    Smokeless tobacco: Never Used   Vaping Use    Vaping Use: Never used   Substance and Sexual Activity    Alcohol use: No    Drug use: No    Sexual activity: Never   Other Topics Concern    Not on file   Social History Narrative    Not on file     Social Determinants of Health     Financial Resource Strain:     Difficulty of Paying Living Expenses: Not on file   Food Insecurity:     Worried About Running Out of Food in the Last Year: Not on file    Ran Out of Food in the Last Year: Not on file   Transportation Needs:     Lack of Transportation (Medical): Not on file    Lack of Transportation (Non-Medical): Not on file   Physical Activity:     Days of Exercise per Week: Not on file    Minutes of Exercise per Session: Not on file   Stress:     Feeling of Stress : Not on file   Social Connections:     Frequency of Communication with Friends and Family: Not on file    Frequency of Social Gatherings with Friends and Family: Not on file    Attends Tenri[employer] Services: Not on file    Active Member of [address] [place] [address] [employer] or Organizations: Not on file    Attends Club or Organization Meetings: Not on file    Marital Status: Not on file   Intimate Partner Violence:     Fear of Current or Ex-Partner: Not on file    Emotionally Abused: Not on file    Physically Abused: Not on file    Sexually Abused: Not on file   Housing Stability:     Unable to Pay for Housing in the Last Year: Not on file    Number of [place] in the Last Year: Not on file    Unstable Housing in the Last Year: Not on file         ALLERGIES: Lisinopril    Review of Systems   Constitutional: Positive for appetite change and fatigue. Negative for fever. HENT: Negative for sore throat. Eyes: Negative for visual disturbance. Respiratory: Negative for shortness of breath. Cardiovascular: Negative for palpitations. Gastrointestinal: Positive for diarrhea and nausea. Negative for vomiting. Genitourinary: Negative for dysuria. Dark-colored urine   Musculoskeletal: Negative for myalgias. Skin: Negative for rash. Neurological: Negative for dizziness. Psychiatric/Behavioral: Negative for dysphoric mood.        Vitals:    01/04/22 1423   BP: (!) 111/47   Pulse: 74   Resp: 18   Temp: 97 °F (36.1 °C)   SpO2: 94%            Physical Exam  Vitals and nursing note reviewed. Constitutional:       General: She is not in acute distress. Appearance: She is ill-appearing. Comments: Frail appearing, somewhat lethargic   HENT:      Head: Normocephalic and atraumatic. Right Ear: External ear normal.      Left Ear: External ear normal.      Nose: Nose normal.      Mouth/Throat:      Mouth: Mucous membranes are moist.      Pharynx: Oropharynx is clear. Eyes:      General: No scleral icterus. Extraocular Movements: Extraocular movements intact. Conjunctiva/sclera: Conjunctivae normal.      Pupils: Pupils are equal, round, and reactive to light. Cardiovascular:      Rate and Rhythm: Normal rate and regular rhythm. Pulses: Normal pulses. Radial pulses are 2+ on the right side and 2+ on the left side. Heart sounds: Normal heart sounds. No murmur heard. No gallop. Pulmonary:      Effort: Tachypnea present. Comments: Bibasilar inspiratory crackles  Abdominal:      General: Abdomen is flat. Bowel sounds are normal. There is no distension. Palpations: Abdomen is soft. Tenderness: There is no abdominal tenderness. There is no right CVA tenderness, left CVA tenderness, guarding or rebound. Musculoskeletal:         General: Normal range of motion. Cervical back: Normal range of motion and neck supple. No rigidity. No muscular tenderness. Right lower le+ Pitting Edema present. Left lower le+ Pitting Edema present. Skin:     General: Skin is warm and dry. Coloration: Skin is not pale. Neurological:      General: No focal deficit present. Mental Status: She is oriented to person, place, and time. She is lethargic. Psychiatric:         Mood and Affect: Mood is depressed. Behavior: Behavior normal.         Thought Content: Thought content normal.         Judgment: Judgment normal.          MDM      VITAL SIGNS:  No data found.       LABS:  Recent Results (from the past 6 hour(s)) CBC WITH AUTOMATED DIFF    Collection Time: 01/04/22  3:58 PM   Result Value Ref Range    WBC 10.9 3.6 - 11.0 K/uL    RBC 5.04 3.80 - 5.20 M/uL    HGB 14.4 11.5 - 16.0 g/dL    HCT 44.8 35.0 - 47.0 %    MCV 88.9 80.0 - 99.0 FL    MCH 28.6 26.0 - 34.0 PG    MCHC 32.1 30.0 - 36.5 g/dL    RDW 13.2 11.5 - 14.5 %    PLATELET 044 753 - 334 K/uL    MPV 10.1 8.9 - 12.9 FL    NRBC 0.0 0  WBC    ABSOLUTE NRBC 0.00 0.00 - 0.01 K/uL    NEUTROPHILS PENDING %    LYMPHOCYTES PENDING %    MONOCYTES PENDING %    EOSINOPHILS PENDING %    BASOPHILS PENDING %    IMMATURE GRANULOCYTES PENDING %    ABS. NEUTROPHILS PENDING K/UL    ABS. LYMPHOCYTES PENDING K/UL    ABS. MONOCYTES PENDING K/UL    ABS. EOSINOPHILS PENDING K/UL    ABS. BASOPHILS PENDING K/UL    ABS. IMM. GRANS. PENDING K/UL    DF PENDING    METABOLIC PANEL, COMPREHENSIVE    Collection Time: 01/04/22  3:58 PM   Result Value Ref Range    Sodium 141 136 - 145 mmol/L    Potassium 3.6 3.5 - 5.1 mmol/L    Chloride 103 97 - 108 mmol/L    CO2 25 21 - 32 mmol/L    Anion gap 13 5 - 15 mmol/L    Glucose 154 (H) 65 - 100 mg/dL    BUN 30 (H) 6 - 20 MG/DL    Creatinine 1.04 (H) 0.55 - 1.02 MG/DL    BUN/Creatinine ratio 29 (H) 12 - 20      GFR est AA >60 >60 ml/min/1.73m2    GFR est non-AA 51 (L) >60 ml/min/1.73m2    Calcium 9.4 8.5 - 10.1 MG/DL    Bilirubin, total 1.2 (H) 0.2 - 1.0 MG/DL    ALT (SGPT) 51 12 - 78 U/L    AST (SGOT) 64 (H) 15 - 37 U/L    Alk.  phosphatase 109 45 - 117 U/L    Protein, total 7.4 6.4 - 8.2 g/dL    Albumin 2.9 (L) 3.5 - 5.0 g/dL    Globulin 4.5 (H) 2.0 - 4.0 g/dL    A-G Ratio 0.6 (L) 1.1 - 2.2     NT-PRO BNP    Collection Time: 01/04/22  3:58 PM   Result Value Ref Range    NT pro- (H) 0 - 450 PG/ML   MAGNESIUM    Collection Time: 01/04/22  3:58 PM   Result Value Ref Range    Magnesium 2.0 1.6 - 2.4 mg/dL   URINALYSIS W/MICROSCOPIC    Collection Time: 01/04/22  3:58 PM   Result Value Ref Range    Color YELLOW/STRAW      Appearance CLEAR CLEAR Specific gravity 1.025 1.003 - 1.030      pH (UA) 5.5 5.0 - 8.0      Protein 100 (A) NEG mg/dL    Glucose Negative NEG mg/dL    Ketone Negative NEG mg/dL    Bilirubin Negative NEG      Blood SMALL (A) NEG      Urobilinogen 0.2 0.2 - 1.0 EU/dL    Nitrites Negative NEG      Leukocyte Esterase TRACE (A) NEG      WBC PENDING /hpf    RBC PENDING /hpf    Epithelial cells PENDING /lpf    Bacteria PENDING /hpf   URINE CULTURE HOLD SAMPLE    Collection Time: 01/04/22  3:58 PM    Specimen: Serum; Urine   Result Value Ref Range    Urine culture hold        Urine on hold in Microbiology dept for 2 days. If unpreserved urine is submitted, it cannot be used for addtional testing after 24 hours, recollection will be required. EKG, 12 LEAD, INITIAL    Collection Time: 01/04/22  3:58 PM   Result Value Ref Range    Ventricular Rate 72 BPM    Atrial Rate 72 BPM    P-R Interval 142 ms    QRS Duration 76 ms    Q-T Interval 372 ms    QTC Calculation (Bezet) 407 ms    Calculated P Axis 29 degrees    Calculated R Axis 19 degrees    Calculated T Axis 52 degrees    Diagnosis       Normal sinus rhythm  Normal ECG  When compared with ECG of 17-AUG-2019 21:17,  Non-specific change in ST segment in Lateral leads     C REACTIVE PROTEIN, QT    Collection Time: 01/04/22  3:59 PM   Result Value Ref Range    C-Reactive protein 7.22 (H) <0.60 mg/dL   COVID-19 RAPID TEST    Collection Time: 01/04/22  4:53 PM   Result Value Ref Range    Specimen source Nasopharyngeal      COVID-19 rapid test Detected (A) NOTD          IMAGING:  XR CHEST PORT   Final Result      Widespread patchy hazy airspace opacities, concerning for multifocal pneumonia. Trace right pleural effusion.                 Medications During Visit:  Medications   dexamethasone (PF) (DECADRON) 10 mg/mL injection 6 mg (has no administration in time range)   lactated ringers bolus infusion 1,000 mL (1,000 mL IntraVENous New Bag 1/4/22 3287)         DECISION MAKING:  Susi Favre is a 79 y.o. female who comes in as above. Work-up remarkable for COVID-19 pneumonia. Patient still feels very weak/lethargic after receiving IVF. At rest, her respirations remain between about 28 and 34 breaths/minute and her oxygen levels were at approximately 87 to 88% on room air. Patient is at very high risk for decompensation if discharged home. She will be started on Decadron and admitted to [facility] for further management. The best contact is the patient's son, Bessy Hilton, who would like to be updated as often as possible about the plan of care. Perfect Serve Consult for Admission  6:33 PM    ED Room Number: SER01/01  Patient Name and age:  Nina Sen 79 y.o.  female  Working Diagnosis:   1. Acute respiratory failure with hypoxia ([address]    2. Pneumonia due to COVID-19 virus    3. Malaise and fatigue    4. Dehydration        COVID-19 Suspicion:  yes  Sepsis present:  no  Reassessment needed: no  Code Status:  Full Code  Readmission: no  Isolation Requirements:  yes  Recommended Level of Care:  telemetry  Department:[facility] Adult ED - 21   Other: COVID-19 pneumonia, hypoxia, dehydration. IMPRESSION:  1. Acute respiratory failure with hypoxia ([address]    2. Pneumonia due to COVID-19 virus    3. Malaise and fatigue    4. Dehydration        DISPOSITION:  Admitted      CRITICAL CARE NOTE :    6:34 PM    I have spent 35 minutes of critical care time involved in lab review, consultations with specialist, family decision- making, bedside attention and documentation. During this entire length of time I was immediately available to the patient .     Carolee Russo NP

## 2022-01-04 NOTE — ED NOTES
Patient covid+ results called in by [facility], . Primary RN and MD aware.

## 2022-01-05 LAB
ATRIAL RATE: 72 BPM
CALCULATED P AXIS, ECG09: 29 DEGREES
CALCULATED R AXIS, ECG10: 19 DEGREES
CALCULATED T AXIS, ECG11: 52 DEGREES
CRP SERPL-MCNC: 4.12 MG/DL (ref 0–0.6)
D DIMER PPP FEU-MCNC: 1.9 MG/L FEU (ref 0–0.65)
DIAGNOSIS, 93000: NORMAL
LDH SERPL L TO P-CCNC: 374 U/L (ref 81–246)
MAGNESIUM SERPL-MCNC: 2.1 MG/DL (ref 1.6–2.4)
P-R INTERVAL, ECG05: 142 MS
PHOSPHATE SERPL-MCNC: 3.4 MG/DL (ref 2.6–4.7)
Q-T INTERVAL, ECG07: 372 MS
QRS DURATION, ECG06: 76 MS
QTC CALCULATION (BEZET), ECG08: 407 MS
TROPONIN-HIGH SENSITIVITY: 21 NG/L (ref 0–51)
VENTRICULAR RATE, ECG03: 72 BPM

## 2022-01-05 PROCEDURE — 65270000032 HC RM SEMIPRIVATE

## 2022-01-05 PROCEDURE — 74011250637 HC RX REV CODE- 250/637: Performed by: FAMILY MEDICINE

## 2022-01-05 PROCEDURE — 84100 ASSAY OF PHOSPHORUS: CPT

## 2022-01-05 PROCEDURE — 74011000258 HC RX REV CODE- 258: Performed by: FAMILY MEDICINE

## 2022-01-05 PROCEDURE — XW033E5 INTRODUCTION OF REMDESIVIR ANTI-INFECTIVE INTO PERIPHERAL VEIN, PERCUTANEOUS APPROACH, NEW TECHNOLOGY GROUP 5: ICD-10-PCS | Performed by: FAMILY MEDICINE

## 2022-01-05 PROCEDURE — 86140 C-REACTIVE PROTEIN: CPT

## 2022-01-05 PROCEDURE — 84484 ASSAY OF TROPONIN QUANT: CPT

## 2022-01-05 PROCEDURE — 94640 AIRWAY INHALATION TREATMENT: CPT

## 2022-01-05 PROCEDURE — 74011250636 HC RX REV CODE- 250/636: Performed by: FAMILY MEDICINE

## 2022-01-05 PROCEDURE — 94664 DEMO&/EVAL PT USE INHALER: CPT

## 2022-01-05 PROCEDURE — XW033H5 INTRODUCTION OF TOCILIZUMAB INTO PERIPHERAL VEIN, PERCUTANEOUS APPROACH, NEW TECHNOLOGY GROUP 5: ICD-10-PCS | Performed by: FAMILY MEDICINE

## 2022-01-05 PROCEDURE — 83735 ASSAY OF MAGNESIUM: CPT

## 2022-01-05 PROCEDURE — 77010033678 HC OXYGEN DAILY

## 2022-01-05 PROCEDURE — 83615 LACTATE (LD) (LDH) ENZYME: CPT

## 2022-01-05 PROCEDURE — 74011250636 HC RX REV CODE- 250/636: Performed by: INTERNAL MEDICINE

## 2022-01-05 PROCEDURE — 74011000250 HC RX REV CODE- 250: Performed by: FAMILY MEDICINE

## 2022-01-05 PROCEDURE — 36415 COLL VENOUS BLD VENIPUNCTURE: CPT

## 2022-01-05 PROCEDURE — 85379 FIBRIN DEGRADATION QUANT: CPT

## 2022-01-05 RX ORDER — SODIUM CHLORIDE 0.9 % (FLUSH) 0.9 %
5-40 SYRINGE (ML) INJECTION EVERY 8 HOURS
Status: DISCONTINUED | OUTPATIENT
Start: 2022-01-05 | End: 2022-01-07 | Stop reason: HOSPADM

## 2022-01-05 RX ORDER — SODIUM CHLORIDE 0.9 % (FLUSH) 0.9 %
5-40 SYRINGE (ML) INJECTION AS NEEDED
Status: DISCONTINUED | OUTPATIENT
Start: 2022-01-05 | End: 2022-01-05 | Stop reason: SDUPTHER

## 2022-01-05 RX ORDER — ONDANSETRON 2 MG/ML
4 INJECTION INTRAMUSCULAR; INTRAVENOUS
Status: DISCONTINUED | OUTPATIENT
Start: 2022-01-05 | End: 2022-01-07 | Stop reason: HOSPADM

## 2022-01-05 RX ORDER — ACETAMINOPHEN 325 MG/1
650 TABLET ORAL
Status: DISCONTINUED | OUTPATIENT
Start: 2022-01-05 | End: 2022-01-07 | Stop reason: HOSPADM

## 2022-01-05 RX ORDER — ASCORBIC ACID 500 MG
500 TABLET ORAL 2 TIMES DAILY
Status: DISCONTINUED | OUTPATIENT
Start: 2022-01-05 | End: 2022-01-07 | Stop reason: HOSPADM

## 2022-01-05 RX ORDER — ALBUTEROL SULFATE 90 UG/1
1 AEROSOL, METERED RESPIRATORY (INHALATION)
Status: DISCONTINUED | OUTPATIENT
Start: 2022-01-05 | End: 2022-01-07 | Stop reason: HOSPADM

## 2022-01-05 RX ORDER — ACETAMINOPHEN 325 MG/1
650 TABLET ORAL
Status: DISCONTINUED | OUTPATIENT
Start: 2022-01-05 | End: 2022-01-06

## 2022-01-05 RX ORDER — SODIUM CHLORIDE 9 MG/ML
75 INJECTION, SOLUTION INTRAVENOUS CONTINUOUS
Status: DISCONTINUED | OUTPATIENT
Start: 2022-01-05 | End: 2022-01-05 | Stop reason: SDUPTHER

## 2022-01-05 RX ORDER — HEPARIN SODIUM 5000 [USP'U]/ML
5000 INJECTION, SOLUTION INTRAVENOUS; SUBCUTANEOUS EVERY 8 HOURS
Status: DISCONTINUED | OUTPATIENT
Start: 2022-01-05 | End: 2022-01-05 | Stop reason: SDUPTHER

## 2022-01-05 RX ORDER — ACETAMINOPHEN 325 MG/1
650 TABLET ORAL
Status: DISCONTINUED | OUTPATIENT
Start: 2022-01-05 | End: 2022-01-05 | Stop reason: SDUPTHER

## 2022-01-05 RX ORDER — SODIUM CHLORIDE 0.9 % (FLUSH) 0.9 %
5-40 SYRINGE (ML) INJECTION EVERY 8 HOURS
Status: DISCONTINUED | OUTPATIENT
Start: 2022-01-05 | End: 2022-01-05 | Stop reason: SDUPTHER

## 2022-01-05 RX ORDER — ZINC SULFATE 50(220)MG
1 CAPSULE ORAL DAILY
Status: DISCONTINUED | OUTPATIENT
Start: 2022-01-06 | End: 2022-01-07 | Stop reason: HOSPADM

## 2022-01-05 RX ORDER — SODIUM CHLORIDE 0.9 % (FLUSH) 0.9 %
5-40 SYRINGE (ML) INJECTION AS NEEDED
Status: DISCONTINUED | OUTPATIENT
Start: 2022-01-05 | End: 2022-01-07 | Stop reason: HOSPADM

## 2022-01-05 RX ORDER — ACETAMINOPHEN 650 MG/1
650 SUPPOSITORY RECTAL
Status: DISCONTINUED | OUTPATIENT
Start: 2022-01-05 | End: 2022-01-07 | Stop reason: HOSPADM

## 2022-01-05 RX ORDER — HEPARIN SODIUM 5000 [USP'U]/ML
5000 INJECTION, SOLUTION INTRAVENOUS; SUBCUTANEOUS EVERY 8 HOURS
Status: DISCONTINUED | OUTPATIENT
Start: 2022-01-05 | End: 2022-01-07 | Stop reason: HOSPADM

## 2022-01-05 RX ORDER — SODIUM CHLORIDE 9 MG/ML
75 INJECTION, SOLUTION INTRAVENOUS CONTINUOUS
Status: DISCONTINUED | OUTPATIENT
Start: 2022-01-05 | End: 2022-01-06

## 2022-01-05 RX ORDER — BISACODYL 5 MG
5 TABLET, DELAYED RELEASE (ENTERIC COATED) ORAL DAILY PRN
Status: DISCONTINUED | OUTPATIENT
Start: 2022-01-05 | End: 2022-01-07 | Stop reason: HOSPADM

## 2022-01-05 RX ADMIN — SODIUM CHLORIDE, PRESERVATIVE FREE 10 ML: 5 INJECTION INTRAVENOUS at 22:10

## 2022-01-05 RX ADMIN — TOCILIZUMAB 400 MG: 20 INJECTION, SOLUTION, CONCENTRATE INTRAVENOUS at 22:09

## 2022-01-05 RX ADMIN — ALBUTEROL SULFATE 1 PUFF: 90 AEROSOL, METERED RESPIRATORY (INHALATION) at 23:56

## 2022-01-05 RX ADMIN — CEFTRIAXONE SODIUM 1 G: 1 INJECTION, POWDER, FOR SOLUTION INTRAMUSCULAR; INTRAVENOUS at 19:52

## 2022-01-05 RX ADMIN — AZITHROMYCIN DIHYDRATE 500 MG: 500 INJECTION, POWDER, LYOPHILIZED, FOR SOLUTION INTRAVENOUS at 19:55

## 2022-01-05 RX ADMIN — SODIUM CHLORIDE 75 ML/HR: 9 INJECTION, SOLUTION INTRAVENOUS at 19:39

## 2022-01-05 RX ADMIN — OXYCODONE HYDROCHLORIDE AND ACETAMINOPHEN 500 MG: 500 TABLET ORAL at 22:09

## 2022-01-05 RX ADMIN — ONDANSETRON 4 MG: 2 INJECTION INTRAMUSCULAR; INTRAVENOUS at 22:09

## 2022-01-05 RX ADMIN — ALBUTEROL SULFATE 1 PUFF: 90 AEROSOL, METERED RESPIRATORY (INHALATION) at 21:01

## 2022-01-05 RX ADMIN — HEPARIN SODIUM 5000 UNITS: 5000 INJECTION INTRAVENOUS; SUBCUTANEOUS at 19:55

## 2022-01-05 NOTE — ED NOTES
Emergency Room Nursing Communication Tool        Verbal shift change report given to Marquetta Gosselin, RN (incoming nurse) by Marcia Brennan RN (outgoing nurse) on [facility] a 79 y.o. female and born 1940 who arrived at the hospital on 1/4/2022  2:12 PM. Report included the following information SBAR, Kardex, [facility] and Recent Results. Significant changes during shift: patient awaiting bed placement.       Issues for physician to address: none            Code Status: Full Code     Chief Complaint: Concern For COVID-19 (Coronavirus)     Admit Diagnosis: Acute respiratory failure with hypoxia ([facility] [address] [J96.01]  Pneumonia due to COVID-19 virus [U07.1, J12.82]     Admitting Provider: Prerna Santa MD     Surgery: * No surgery found *     Infections: COVID-19     Allergies: Lisinopril     Current diet: ADULT DIET Regular     Lines:   Peripheral IV 01/04/22 Left Antecubital (Active)   Site Assessment Clean, dry, & intact 01/04/22 1556   Phlebitis Assessment 0 01/04/22 1556   Dressing Status Clean, dry, & intact 01/04/22 1556   Hub Color/Line Status Flushed 01/04/22 1556   Action Taken Blood drawn 01/04/22 1556                Vital Signs:     Patient Vitals for the past 12 hrs:   Pulse Resp BP SpO2   01/05/22 0615 77 (!) 32 124/69 90 %   01/05/22 0600 71 26 129/61 93 %   01/05/22 0500 65 25 119/61 93 %   01/05/22 0430 66 27 130/64 93 %   01/05/22 0330 66 23 126/63 93 %   01/05/22 0300 64 22 (!) 120/57 94 %   01/05/22 0230 63 25 122/60 94 %   01/05/22 0200 66 30 131/66 92 %   01/05/22 0130 68 (!) 33 (!) 143/66 95 %   01/05/22 0100 69 (!) 31 136/65 95 %   01/05/22 0030 65 29 (!) 122/59 95 %   01/05/22 0000 65 30 (!) 120/55 96 %   01/04/22 2330 62 28 (!) 116/59 95 %   01/04/22 2300 66 30 121/62 95 %   01/04/22 2230 71 (!) 35 131/62 93 %   01/04/22 2200 80 (!) 31 131/67 93 %   01/04/22 2130 83 (!) 31 134/73 96 %   01/04/22 2100 74 28 130/66 94 %   01/04/22 2030 76 30 134/69 93 %      Intake & Output: Intake/Output Summary (Last 24 hours) at 1/5/2022 0802  Last data filed at 1/4/2022 2002  Gross per 24 hour   Intake 1000 ml   Output    Net 1000 ml      Laboratory Results:   No results found for this or any previous visit (from the past 12 hour(s)). Opportunity for questions and clarifications were given to the incoming nurse. Patient's bed locked and is in low position, side rails up x2, door open PRN, call bell within reach of patient and patient not in distress.       Signed by: Juliana Troncoso RN, KIKI, BSN, VIA [facility]                       1/5/2022 at 8:02 AM

## 2022-01-05 NOTE — H&P
History & Physical    Primary Care Provider: Judah Klein MD  Source of Information: Patient     History of Presenting Illness:   Jayden Cordova is a 79 y.o. female who presents with shortness of breath    [facility] apparently [facility] complaining of fatigue, malaise, dark urine, cough and shortness of breath associated with diarrhea that started about a week back, patient reports that she was quite short of breath today, reports that her son developed COVID-19 infection 2 weeks ago, reports that she is unvaccinated for COVID-19, when the patient arrived a [facility] she was found to be hypoxic, tested positive for Covid and was requested to be admitted to the hospitalist service, patient denies any other complaints or problems. The patient denies any Headache, blurry vision, sore throat, trouble swallowing, trouble with speech, chest pain,, cough, fever, chills, N/V/, abd pain, urinary symptoms, constipation, recent travels, sick contacts, focal or generalized neurological symptoms,  falls, injuries, rashes, contact with COVID-19 diagnosed patients, hematemesis, melena, hemoptysis, hematuria, rashes, denies starting any new medications and denies any other concerns or problems besides as mentioned above. Review of Systems:  A comprehensive review of systems was negative except for that written in the History of Present Illness.    All other systems reviewed, pertinent positives and negatives noted in HPI    Past Medical History:   Diagnosis Date    CAD (coronary artery disease)     CKD (chronic kidney disease), stage III ([facility] [address] 9/26/2014    Followed by Dr. 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 Will Hearn      Cardiac Cath March 2011     Prior to Admission medications    Medication Sig Start Date End Date Taking? Authorizing Provider   ezetimibe (ZETIA) 10 mg tablet TAKE 1 TABLET EVERY DAY 8/9/21  Yes Guille Reed MD   clopidogreL (PLAVIX) 75 mg tab TAKE 1 TABLET EVERY DAY 6/4/21  Yes Guille Reed MD   rosuvastatin (CRESTOR) 20 mg tablet TAKE 1 TABLET EVERY NIGHT 6/4/21  Yes Claude Crafts, MD   amLODIPine (NORVASC) 5 mg tablet Take 1 Tablet by mouth daily. 6/3/21  Yes Guille Reed MD   losartan (COZAAR) 50 mg tablet Take 1 Tablet by mouth daily. 6/3/21  Yes Guille Reed MD   carvediloL (COREG) 3.125 mg tablet TAKE 1 TABLET TWICE DAILY 5/25/21  Yes Guille Reed MD   famotidine (PEPCID) 20 mg tablet TAKE 1 TABLET TWICE DAILY 4/22/21  Yes Claude Crafts, MD   cyanocobalamin 1,000 mcg tablet Take 1,000 mcg by mouth daily. Yes Provider, Historical   nicotinic acid (NIACIN) 500 mg tablet Take 500 mg by mouth Daily (before breakfast). Yes Provider, Historical   cholecalciferol, vitamin D3, (VITAMIN D3) 2,000 unit tab Take 1 Tab by mouth daily. Yes Provider, Historical   aspirin 81 mg tablet Take 81 mg by mouth daily. Yes Provider, Historical     Allergies   Allergen Reactions    Lisinopril Other (comments)     High K      Family History   Problem Relation Age of Onset    Hypertension Mother     Diabetes Maternal Grandmother     Diabetes Paternal Grandfather       Family history was discussed with the patient, all pertinent and relevant details are mentioned as above, no other pertinent and relevant family history was noted on my discussion with the patient.   Patient specifically denies any history of Gaucher disease in the family  SOCIAL HISTORY:  Patient resides:  Independently x   Assisted Living    SNF    With family care Smoking history:   None x   Former    Chronic      Alcohol history:   None    Social x   Chronic      Ambulates:   Independently x   w/cane    w/walker    w/wc    CODE STATUS:  DNR    Full x   Other      Objective:     Physical Exam:     Visit Vitals  BP (!) 143/90 (BP 1 Location: Right upper arm, BP Patient Position: At rest)   Pulse 86   Temp 98.4 °F (36.9 °C)   Resp 22   SpO2 92%    O2 Flow Rate (L/min): 3 l/min O2 Device: Nasal cannula    General : alert x 2, awake, no acute distress  HEENT: PEERL, moist mucus membrane, TM clear  Neck: supple, no JVD, no meningeal signs  Chest: Decreased basal breath sounds, scattered wheezes throughout lung fields  CVS: S1 S2 heard,   Abd: soft/ Non tender, non distended, BS physiological,   Ext: no clubbing, no cyanosis, no edema, brisk 2+ DP pulses  Neuro/Psych: pleasant mood and affect no focal neurological deficit  Skin: warm     EKG: Normal sinus rhythm with nonspecific ST changes      Data Review:     Recent Days:  Recent Labs     01/04/22  1558   WBC 10.9   HGB 14.4   HCT 44.8        Recent Labs     01/04/22  1559 01/04/22  1558   NA  --  141   K  --  3.6   CL  --  103   CO2  --  25   GLU  --  154*   BUN  --  30*   CREA  --  1.04*   CA  --  9.4   MG  --  2.0   ALB  --  2.9*   ALT  --  51   INR 1.0  --      No results for input(s): PH, PCO2, PO2, HCO3, FIO2 in the last 72 hours. 24 Hour Results:  Recent Results (from the past 24 hour(s))   LACTIC ACID    Collection Time: 01/04/22  6:22 PM   Result Value Ref Range    Lactic acid 1.3 0.4 - 2.0 MMOL/L         Imaging:       XR CHEST PORT    Result Date: 1/4/2022  Widespread patchy hazy airspace opacities, concerning for multifocal pneumonia. Trace right pleural effusion.        Assessment/Plan     Acute hypoxic respiratory failure  COVID-19 pneumonia  Diarrhea  Acute cystitis  Chronic kidney disease stage III  Hypertension  Hyperlipidemia    Patient will be admitted on a telemetry bed, start patient on broad-spectrum IV antibiotics, dexamethasone, zinc, vitamin C, oxygen support, ABG, D-dimer and other inflammatory markers, supportive care and further intervention per hospital course, pulmonary consult, telemetry and reassess as needed  Diarrhea likely send above, will get stool studies and continue to closely monitor,  IV antibiotics, urine culture, if symptoms persist may consider imaging and/or urology consult, continue to monitor  Monitor renal function  Optimize blood pressure control  Continue to monitor    GI DVT prophylaxis: Patient will be on heparin         . CRITICAL CARE ATTESTATION:  I had a face to face encounter with the patient, reviewed and interpreted patient data including clinical events, labs, images, vital signs, I/O's, and examined patient. I have discussed the case and the plan and management of the patient's care with the consulting services, the bedside nurses and necessary ancillary providers. NOTE OF PERSONAL INVOLVEMENT IN CARE   This patient has a high probability of imminent, clinically significant deterioration, which requires the highest level of preparedness to intervene urgently. I participated in the decision-making and personally managed or directed the management of the following life and organ supporting interventions that required my frequent assessment to treat or prevent imminent deterioration. I personally spent 50 minutes of critical care time. This is time spent at this critically ill patient's bedside actively involved in patient care as well as the coordination of care and discussions with the patient's family. This does not include any procedural time which has been billed separately. Please note that this dictation was completed with [employer] the [employer] voice recognition software. Quite often unanticipated grammatical, syntax, homophones, and other interpretive errors are inadvertently transcribed by the computer software.   Please disregard these errors. Please excuse any errors that have escaped final proofreading.          Signed By: Lacey Carpenter MD     January 5, 2022

## 2022-01-05 NOTE — ED NOTES
Emergency Room Nursing Note        Patient Name: Trish Velez      : 1940             MRN: [id number]      Chief Complaint:  Concern For COVID-19 (Coronavirus)      Admit Diagnosis: Acute respiratory failure with hypoxia ([facility] [address] [J96.01]  Pneumonia due to COVID-19 virus [U07.1, J12.82]      Admitting Provider: Audra Aparicio MD      Surgery: * No surgery found *           Son called to get update on his mother and was informed that she is awaiting a bed assignment in [facility].          Lines:   Peripheral IV 22 Left Antecubital (Active)   Site Assessment Clean, dry, & intact 22 1556   Phlebitis Assessment 0 22 1556   Dressing Status Clean, dry, & intact 22 1556   Hub Color/Line Status Flushed 22 1556   Action Taken Blood drawn 22 1556         Signed by: Charlotte Melissa RN, KIKI, BSN, [address] [place] [address] [address]                                              2022 at 7:28 AM

## 2022-01-05 NOTE — PROGRESS NOTES
Transition of Care Plan   RUR- 9% Moderate Risk    DISPOSITION: The disposition plan is  pending medical progression and recommendation.  F/U with PCP/Specialist     Transport: Family - Jose R Aguirre - [phone]    Reason for Admission:  \"Covid\"                   RUR Score: 9% Moderate Risk                   Plan for utilizing home health:  N/A        PCP: First and Last name: Viry Heaton MD     Name of Practice: [facility]   Are you a current patient: Yes/No: Yes   Approximate date of last visit: Unsure at this time. Can you participate in a virtual visit with your PCP: N/A                    Current Advanced Directive/Advance Care Plan: Full Code  Has no ACP documentation on file at this time. Healthcare Decision Maker:   Click here to complete [address] including selection of the Healthcare Decision Maker Relationship (ie \"Primary\")   Son                        Transition of Care Plan: Pending recommendation. Reviewed chart for transitions of care and discussed in rounds. CM met with patient at bedside to explain role and offer support. Patient is alert and oriented x4 and confirmed demographics. Baseline: DME - walker and wheelchair  ADLs/IDALS: [address]: N/A  Previous SNF/IPR: N/A  ER Contact: Jose R Aguirre - [phone]    Patient lives in a 2 level house with 3 steps to enter. Patients son reports that his mother lives with him. Patients son also reports that patients spouse passed away on New Years Leslie. CM updated emergency contact information. Patients son is unsure of when patient last visited PCP. Patients son reports that patient has opted to get the covid vaccine at this time. Patient is independent with ADLs/IDALs. Patient uses DMEs (walker and wheelchair) to ambulate. Patient's preferred pharmacy is [facility]. Patient's son is expected to transport at discharge.      Care Management Interventions  PCP Verified by CM: No  Palliative Care Criteria Met (RRAT>21 & CHF Dx)?: No  Mode of Transport at Discharge: Other (see comment)  Transition of Care Consult (CM Consult): Discharge Planning  MyChart Signup: No  Discharge Durable Medical Equipment: No  Physical Therapy Consult: No  Occupational Therapy Consult: No  Speech Therapy Consult: No  Support Systems: Child(moy)  Confirm Follow Up Transport: Family  The Patient and/or Patient Representative was Provided with a Choice of Provider and Agrees with the Discharge Plan?: Yes  Freedom of Choice List was Provided with Basic Dialogue that Supports the Patient's Individualized Plan of Care/Goals, Treatment Preferences and Shares the Quality Data Associated with the Providers?: Yes  [employer] Information Provided?: No    Medicare pt has received, reviewed, and signed 1st IM letter informing them of their right to appeal the discharge. Signed copy has been placed on pt bedside chart. CM will continue to provide updates as they become available. CM will continue to follow, provide support and assist with TARYN needs as they arise.     Vance Labor, MSW, CRM, LMHP-e a

## 2022-01-06 LAB
ALBUMIN SERPL-MCNC: 2.2 G/DL (ref 3.5–5)
ALBUMIN/GLOB SERPL: 0.6 {RATIO} (ref 1.1–2.2)
ALP SERPL-CCNC: 86 U/L (ref 45–117)
ALT SERPL-CCNC: 27 U/L (ref 12–78)
ANION GAP SERPL CALC-SCNC: 6 MMOL/L (ref 5–15)
APTT PPP: 27.3 SEC (ref 22.1–31)
AST SERPL-CCNC: 27 U/L (ref 15–37)
BASOPHILS # BLD: 0 K/UL (ref 0–0.1)
BASOPHILS NFR BLD: 0 % (ref 0–1)
BILIRUB SERPL-MCNC: 0.5 MG/DL (ref 0.2–1)
BUN SERPL-MCNC: 27 MG/DL (ref 6–20)
BUN/CREAT SERPL: 33 (ref 12–20)
CALCIUM SERPL-MCNC: 8.5 MG/DL (ref 8.5–10.1)
CHLORIDE SERPL-SCNC: 111 MMOL/L (ref 97–108)
CO2 SERPL-SCNC: 25 MMOL/L (ref 21–32)
CREAT SERPL-MCNC: 0.81 MG/DL (ref 0.55–1.02)
CRP SERPL-MCNC: 2.91 MG/DL (ref 0–0.6)
D DIMER PPP FEU-MCNC: 1.37 MG/L FEU (ref 0–0.65)
DIFFERENTIAL METHOD BLD: ABNORMAL
EOSINOPHIL # BLD: 0 K/UL (ref 0–0.4)
EOSINOPHIL NFR BLD: 0 % (ref 0–7)
ERYTHROCYTE [DISTWIDTH] IN BLOOD BY AUTOMATED COUNT: 13 % (ref 11.5–14.5)
FIBRINOGEN PPP-MCNC: 466 MG/DL (ref 200–475)
GLOBULIN SER CALC-MCNC: 3.6 G/DL (ref 2–4)
GLUCOSE SERPL-MCNC: 130 MG/DL (ref 65–100)
HCT VFR BLD AUTO: 38.5 % (ref 35–47)
HGB BLD-MCNC: 12.5 G/DL (ref 11.5–16)
IMM GRANULOCYTES # BLD AUTO: 0.1 K/UL (ref 0–0.04)
IMM GRANULOCYTES NFR BLD AUTO: 1 % (ref 0–0.5)
INR PPP: 1.1 (ref 0.9–1.1)
LACTATE SERPL-SCNC: 1 MMOL/L (ref 0.4–2)
LYMPHOCYTES # BLD: 1 K/UL (ref 0.8–3.5)
LYMPHOCYTES NFR BLD: 11 % (ref 12–49)
MCH RBC QN AUTO: 28.9 PG (ref 26–34)
MCHC RBC AUTO-ENTMCNC: 32.5 G/DL (ref 30–36.5)
MCV RBC AUTO: 88.9 FL (ref 80–99)
MONOCYTES # BLD: 0.5 K/UL (ref 0–1)
MONOCYTES NFR BLD: 6 % (ref 5–13)
NEUTS SEG # BLD: 7.4 K/UL (ref 1.8–8)
NEUTS SEG NFR BLD: 82 % (ref 32–75)
NRBC # BLD: 0 K/UL (ref 0–0.01)
NRBC BLD-RTO: 0 PER 100 WBC
PLATELET # BLD AUTO: 355 K/UL (ref 150–400)
PMV BLD AUTO: 9.8 FL (ref 8.9–12.9)
POTASSIUM SERPL-SCNC: 3.4 MMOL/L (ref 3.5–5.1)
PROT SERPL-MCNC: 5.8 G/DL (ref 6.4–8.2)
PROTHROMBIN TIME: 11.2 SEC (ref 9–11.1)
RBC # BLD AUTO: 4.33 M/UL (ref 3.8–5.2)
SODIUM SERPL-SCNC: 142 MMOL/L (ref 136–145)
THERAPEUTIC RANGE,PTTT: NORMAL SECS (ref 58–77)
WBC # BLD AUTO: 9 K/UL (ref 3.6–11)

## 2022-01-06 PROCEDURE — 83605 ASSAY OF LACTIC ACID: CPT

## 2022-01-06 PROCEDURE — 87449 NOS EACH ORGANISM AG IA: CPT

## 2022-01-06 PROCEDURE — 85384 FIBRINOGEN ACTIVITY: CPT

## 2022-01-06 PROCEDURE — 85610 PROTHROMBIN TIME: CPT

## 2022-01-06 PROCEDURE — 65270000032 HC RM SEMIPRIVATE

## 2022-01-06 PROCEDURE — 74011000250 HC RX REV CODE- 250: Performed by: FAMILY MEDICINE

## 2022-01-06 PROCEDURE — 77010033678 HC OXYGEN DAILY

## 2022-01-06 PROCEDURE — 36415 COLL VENOUS BLD VENIPUNCTURE: CPT

## 2022-01-06 PROCEDURE — 80053 COMPREHEN METABOLIC PANEL: CPT

## 2022-01-06 PROCEDURE — 94760 N-INVAS EAR/PLS OXIMETRY 1: CPT

## 2022-01-06 PROCEDURE — 87899 AGENT NOS ASSAY W/OPTIC: CPT

## 2022-01-06 PROCEDURE — 86140 C-REACTIVE PROTEIN: CPT

## 2022-01-06 PROCEDURE — 74011250637 HC RX REV CODE- 250/637: Performed by: FAMILY MEDICINE

## 2022-01-06 PROCEDURE — 85379 FIBRIN DEGRADATION QUANT: CPT

## 2022-01-06 PROCEDURE — 74011000258 HC RX REV CODE- 258: Performed by: FAMILY MEDICINE

## 2022-01-06 PROCEDURE — 85730 THROMBOPLASTIN TIME PARTIAL: CPT

## 2022-01-06 PROCEDURE — 87086 URINE CULTURE/COLONY COUNT: CPT

## 2022-01-06 PROCEDURE — 85025 COMPLETE CBC W/AUTO DIFF WBC: CPT

## 2022-01-06 PROCEDURE — 94640 AIRWAY INHALATION TREATMENT: CPT

## 2022-01-06 PROCEDURE — 74011250636 HC RX REV CODE- 250/636: Performed by: FAMILY MEDICINE

## 2022-01-06 RX ORDER — CLOPIDOGREL BISULFATE 75 MG/1
75 TABLET ORAL DAILY
Status: DISCONTINUED | OUTPATIENT
Start: 2022-01-06 | End: 2022-01-07 | Stop reason: HOSPADM

## 2022-01-06 RX ORDER — EZETIMIBE 10 MG/1
10 TABLET ORAL DAILY
Status: DISCONTINUED | OUTPATIENT
Start: 2022-01-06 | End: 2022-01-07 | Stop reason: HOSPADM

## 2022-01-06 RX ORDER — GUAIFENESIN 100 MG/5ML
81 LIQUID (ML) ORAL DAILY
Status: DISCONTINUED | OUTPATIENT
Start: 2022-01-06 | End: 2022-01-07 | Stop reason: HOSPADM

## 2022-01-06 RX ORDER — AMLODIPINE BESYLATE 5 MG/1
5 TABLET ORAL DAILY
Status: DISCONTINUED | OUTPATIENT
Start: 2022-01-06 | End: 2022-01-07 | Stop reason: HOSPADM

## 2022-01-06 RX ORDER — FAMOTIDINE 20 MG/1
20 TABLET, FILM COATED ORAL 2 TIMES DAILY
Status: DISCONTINUED | OUTPATIENT
Start: 2022-01-06 | End: 2022-01-06

## 2022-01-06 RX ORDER — ROSUVASTATIN CALCIUM 10 MG/1
20 TABLET, COATED ORAL
Status: DISCONTINUED | OUTPATIENT
Start: 2022-01-06 | End: 2022-01-07 | Stop reason: HOSPADM

## 2022-01-06 RX ORDER — CARVEDILOL 3.12 MG/1
3.12 TABLET ORAL 2 TIMES DAILY
Status: DISCONTINUED | OUTPATIENT
Start: 2022-01-06 | End: 2022-01-07 | Stop reason: HOSPADM

## 2022-01-06 RX ORDER — FAMOTIDINE 20 MG/1
20 TABLET, FILM COATED ORAL DAILY
Status: DISCONTINUED | OUTPATIENT
Start: 2022-01-07 | End: 2022-01-07 | Stop reason: HOSPADM

## 2022-01-06 RX ADMIN — HEPARIN SODIUM 5000 UNITS: 5000 INJECTION INTRAVENOUS; SUBCUTANEOUS at 17:30

## 2022-01-06 RX ADMIN — REMDESIVIR 200 MG: 100 INJECTION, POWDER, LYOPHILIZED, FOR SOLUTION INTRAVENOUS at 00:17

## 2022-01-06 RX ADMIN — DEXAMETHASONE 6 MG: 1 TABLET ORAL at 10:15

## 2022-01-06 RX ADMIN — SODIUM CHLORIDE, PRESERVATIVE FREE 10 ML: 5 INJECTION INTRAVENOUS at 22:21

## 2022-01-06 RX ADMIN — CARVEDILOL 3.12 MG: 3.12 TABLET, FILM COATED ORAL at 17:29

## 2022-01-06 RX ADMIN — HEPARIN SODIUM 5000 UNITS: 5000 INJECTION INTRAVENOUS; SUBCUTANEOUS at 11:00

## 2022-01-06 RX ADMIN — FAMOTIDINE 20 MG: 20 TABLET ORAL at 10:16

## 2022-01-06 RX ADMIN — EZETIMIBE 10 MG: 10 TABLET ORAL at 10:15

## 2022-01-06 RX ADMIN — REMDESIVIR 100 MG: 100 INJECTION, POWDER, LYOPHILIZED, FOR SOLUTION INTRAVENOUS at 22:20

## 2022-01-06 RX ADMIN — CLOPIDOGREL BISULFATE 75 MG: 75 TABLET ORAL at 10:15

## 2022-01-06 RX ADMIN — ALBUTEROL SULFATE 1 PUFF: 90 AEROSOL, METERED RESPIRATORY (INHALATION) at 19:54

## 2022-01-06 RX ADMIN — OXYCODONE HYDROCHLORIDE AND ACETAMINOPHEN 500 MG: 500 TABLET ORAL at 17:29

## 2022-01-06 RX ADMIN — AMLODIPINE BESYLATE 5 MG: 5 TABLET ORAL at 10:15

## 2022-01-06 RX ADMIN — ALBUTEROL SULFATE 1 PUFF: 90 AEROSOL, METERED RESPIRATORY (INHALATION) at 04:18

## 2022-01-06 RX ADMIN — CARVEDILOL 3.12 MG: 3.12 TABLET, FILM COATED ORAL at 10:15

## 2022-01-06 RX ADMIN — ALBUTEROL SULFATE 1 PUFF: 90 AEROSOL, METERED RESPIRATORY (INHALATION) at 23:04

## 2022-01-06 RX ADMIN — SODIUM CHLORIDE, PRESERVATIVE FREE 10 ML: 5 INJECTION INTRAVENOUS at 04:17

## 2022-01-06 RX ADMIN — OXYCODONE HYDROCHLORIDE AND ACETAMINOPHEN 500 MG: 500 TABLET ORAL at 10:15

## 2022-01-06 RX ADMIN — HEPARIN SODIUM 5000 UNITS: 5000 INJECTION INTRAVENOUS; SUBCUTANEOUS at 04:16

## 2022-01-06 RX ADMIN — ROSUVASTATIN 20 MG: 10 TABLET, FILM COATED ORAL at 22:21

## 2022-01-06 RX ADMIN — ALBUTEROL SULFATE 1 PUFF: 90 AEROSOL, METERED RESPIRATORY (INHALATION) at 14:31

## 2022-01-06 RX ADMIN — ALBUTEROL SULFATE 1 PUFF: 90 AEROSOL, METERED RESPIRATORY (INHALATION) at 07:18

## 2022-01-06 RX ADMIN — ASPIRIN 81 MG CHEWABLE TABLET 81 MG: 81 TABLET CHEWABLE at 10:15

## 2022-01-06 RX ADMIN — ZINC SULFATE 220 MG (50 MG) CAPSULE 1 CAPSULE: CAPSULE at 10:15

## 2022-01-06 NOTE — CONSULTS
PULMONARY MEDICINE    Initial Physician Consultation Note    Name: Delores Black   : 1940   MRN: [id number]   Date: 2022      Subjective:   Consult Note: 2022   Requesting Physician: Dr. Kip Shaver for consult:    Medical records and data reviewed. Patient is a 79 y.o. female who presented to the hospital for dyspnea. Patient had been diagnosed with COVID-19 infection several days ago. She was noted to be hypoxic with chest x-ray showing multilobar alveolar infiltrates compatible with COVID-19 pneumonia. Repeat test was positive as well. She also has 1 out of 4 bottles with gram-positive cocci in clusters, ID of the bacteria is pending but is suspected to be a contaminant given clinical stability and improvement. She has satisfactory oxygenation on 3 L nasal cannula. She is receiving dexamethasone, remdesivir and has received a dose of Tocilizumab. Procalcitonin level was low    Review of Systems:     A comprehensive 12 system review of systems was negative except for as documented in HPI    Assessment:     Acute hypoxic pulmonary insufficiency, on 3 L of nasal oxygen  Severe multilobar pneumonia secondary to COVID-19 infection  Gram-positive bacteremia suspected to be a contaminant  Acute kidney injury, improving    Recommendations:     Wean oxygen as tolerated  Incentive spirometer  Receiving remdesivir  Received Tocilizumab, CRP is low  On dexamethasone  Follow cultures  Receiving heparin  Continue to monitor clinical course  We are following peripherally and will be available to help again as needed.   Discussed with RN    Active Problem List:     Problem List  Date Reviewed: 2022          Codes Class    Acute respiratory failure with hypoxia (HCC) ICD-10-CM: J96.01  ICD-9-CM: 518.81         * (Principal) Pneumonia due to COVID-19 virus ICD-10-CM: U07.1, J12.82  ICD-9-CM: 480.8, 079.89         Coronary artery disease involving native coronary artery of native heart without angina pectoris ICD-10-CM: I25.10  ICD-9-CM: 414.01         Heart palpitations ICD-10-CM: R00.2  ICD-9-CM: 785.1         Hyperlipidemia ICD-10-CM: E78.5  ICD-9-CM: 272.4         IFG (impaired fasting glucose) ICD-10-CM: R73.01  ICD-9-CM: 790.21         Carotid artery stenosis ICD-10-CM: I65.29  ICD-9-CM: 433.10     Overview Signed 3/13/2014  1:41 PM by Jay Wiley MD     Carotid US: Mod LICA plaque <30%, no evidence of Subclavian Steal.               PVC (premature ventricular contraction) ICD-10-CM: I49.3  ICD-9-CM: 427.69         GERD (gastroesophageal reflux disease) ICD-10-CM: K21.9  ICD-9-CM: 530.81         PAC (premature atrial contraction) ICD-10-CM: I49.1  ICD-9-CM: 427.61         CAD (coronary artery disease) ICD-10-CM: I25.10  ICD-9-CM: 414.00     Overview Addendum 3/13/2014  1:39 PM by Jay Wiley MD     Stent to RCA and LCX 3/2011; Cath 8/12 with proximal RCA stent - needs lifelong asa and plavix per Dr. Reynaldo Beavers                     Allergic rhinitis ICD-10-CM: J30.9  ICD-9-CM: 477.9         HTN (hypertension) ICD-10-CM: I10  ICD-9-CM: 401.9               Past Medical History:      has a past medical history of CAD (coronary artery disease), CKD (chronic kidney disease), stage III ([facility] [address] (9/26/2014), HTN (hypertension) (10/1/2010), Hyperlipidemia, and Hypertension. Past Surgical History:      has a past surgical history that includes hx tonsillectomy; pr cardiac surg procedure unlist; and hx heart catheterization (8/20/12). Home Medications:     Prior to Admission medications    Medication Sig Start Date End Date Taking? Authorizing Provider   ezetimibe (ZETIA) 10 mg tablet TAKE 1 TABLET EVERY DAY 8/9/21  Yes Babak Hdz MD   clopidogreL (PLAVIX) 75 mg tab TAKE 1 TABLET EVERY DAY 6/4/21  Yes Babak Hdz MD   rosuvastatin (CRESTOR) 20 mg tablet TAKE 1 TABLET EVERY NIGHT 6/4/21  Yes Jay Wiley MD   amLODIPine (NORVASC) 5 mg tablet Take 1 Tablet by mouth daily. 6/3/21  Yes Linda Miner MD   losartan (COZAAR) 50 mg tablet Take 1 Tablet by mouth daily. 6/3/21  Yes Linda Miner MD   carvediloL (COREG) 3.125 mg tablet TAKE 1 TABLET TWICE DAILY 21  Yes Linda Miner MD   famotidine (PEPCID) 20 mg tablet TAKE 1 TABLET TWICE DAILY 21  Yes Blanca Capellan MD   cyanocobalamin 1,000 mcg tablet Take 1,000 mcg by mouth daily. Yes Provider, Historical   nicotinic acid (NIACIN) 500 mg tablet Take 500 mg by mouth Daily (before breakfast). Yes Provider, Historical   cholecalciferol, vitamin D3, (VITAMIN D3) 2,000 unit tab Take 1 Tab by mouth daily. Yes Provider, Historical   aspirin 81 mg tablet Take 81 mg by mouth daily. Yes Provider, Historical       Allergies/Social/Family History:      Allergies   Allergen Reactions    Lisinopril Other (comments)     High K      Social History     Tobacco Use    Smoking status: Never Smoker    Smokeless tobacco: Never Used   Substance Use Topics    Alcohol use: No      Family History   Problem Relation Age of Onset    Hypertension Mother     Diabetes Maternal Grandmother     Diabetes Paternal Grandfather             Objective:   Vital Signs:  Visit Vitals  /69 (BP 1 Location: Right arm, BP Patient Position: At rest)   Pulse 72   Temp 98.3 °F (36.8 °C)   Resp 20   Wt 45.8 kg (100 lb 15.5 oz)   SpO2 92%   BMI 17.33 kg/m²    O2 Flow Rate (L/min): 3 l/min O2 Device: Nasal cannula Temp (24hrs), Av.5 °F (36.9 °C), Min:98.1 °F (36.7 °C), Max:98.8 °F (37.1 °C)           Intake/Output:     Intake/Output Summary (Last 24 hours) at 2022 1626  Last data filed at 2022 0017  Gross per 24 hour   Intake 1136.25 ml   Output    Net 1136.25 ml       Pertinent physical exam performed with PPE and COVID 19 distancing precautions as indicated  Physical exam findings of attending physician rounding on patient today reviewed as documented    Physical Exam:   General:  Alert, cooperative, no distress, appears stated age. Tired appearing   Head:  Normocephalic   Lungs:   Symmetrical expansion   Chest wall:  No deformity. Heart:  Regular rate and rhythm   Abdomen:   Non-distended   Extremities: Atraumatic, no cyanosis or edema. Skin: No rashes or lesions   Neurologic: Grossly nonfocal         LABS AND  DATA: Personally reviewed  Recent Labs     01/06/22  0520 01/04/22  1558   WBC 9.0 10.9   HGB 12.5 14.4   HCT 38.5 44.8    332     Recent Labs     01/06/22  0520 01/05/22  1950 01/04/22  1558     --  141   K 3.4*  --  3.6   *  --  103   CO2 25  --  25   BUN 27*  --  30*   CREA 0.81  --  1.04*   *  --  154*   CA 8.5  --  9.4   MG  --  2.1 2.0   PHOS  --  3.4  --      Recent Labs     01/06/22  0520 01/04/22  1558   AP 86 109   TP 5.8* 7.4   ALB 2.2* 2.9*   GLOB 3.6 4.5*     Recent Labs     01/06/22  0520 01/04/22  1559   INR 1.1 1.0   PTP 11.2* 10.4   APTT 27.3  --       No results for input(s): PHI, PCO2I, PO2I, FIO2I in the last 72 hours. No results for input(s): CPK, CKMB, TROIQ, BNPP in the last 72 hours. MEDS: Reviewed    Chest Imaging: personally reviewed and report checked    Tele- reviewed    Medical decision making:   I have reviewed the flowsheet and previous day's notes  Patient has acute or chronic illness that poses a threat to life or bodily function  Review and order of Clinical lab tests  Review and Order of Radiology tests  Independent visualization of Image  Reviewed O2  High Risk Drug therapy requiring intensive monitoring for toxicity: eg steroids, pressors, antibiotics        Thank you for allowing me to participate in this patient's care.     Johnny Anand MD      [facility]

## 2022-01-06 NOTE — PROGRESS NOTES
Bedside shift change report given to kie (oncoming nurse) by Shantelle Jimenez (offgoing nurse). Report included the following information SBAR, Kardex and Intake/Output.

## 2022-01-06 NOTE — PROGRESS NOTES
[facility] [facility] Adult  Hospitalist Group                                                                                          Hospitalist Progress Note  Brandi Cheng MD  Answering service: [phone] from in house phone        Date of Service:  2022  NAME:  Gautam Hanson  :  1940  MRN:  [id number]      Admission Summary:   Gautam Hanson is a 79 y.o. female who presents with shortness of breath     Patient apparently [facility] the [facility] complaining of fatigue, malaise, dark urine, cough and shortness of breath associated with diarrhea that started about a week back, patient reports that she was quite short of breath today, reports that her son developed COVID-19 infection 2 weeks ago, reports that she is unvaccinated for COVID-19, when the patient arrived a [facility] she was found to be hypoxic, tested positive for Covid and was requested to be admitted to the hospitalist service, patient denies any other complaints or problems.      The patient denies any Headache, blurry vision, sore throat, trouble swallowing, trouble with speech, chest pain,, cough, fever, chills, N/V/, abd pain, urinary symptoms, constipation, recent travels, sick contacts, focal or generalized neurological symptoms,  falls, injuries, rashes, contact with COVID-19 diagnosed patients, hematemesis, melena, hemoptysis, hematuria, rashes, denies starting any new medications and denies any other concerns or problems besides as mentioned above.       Interval history / Subjective:     F/u SOB  On 3l NC, hypoxic on that earlier   Feels much better  Comfortably laying in bed  Assessment & Plan:     Acute hypoxic respiratory failure  COVID-19 pneumonia  -Vit C/Zinc  -s/p actemra   -Decadron, continue  -Remdesivir --  -Cef/azithro, stop. procalcitonin low    GPC in clusters in blood   -likely contaminant  -follow final results  -Follow repeat blood culture  -Will hold off on antibiotics for now    Diarrhea much improved  Probable acute cystitis. Follow urine culture but the patient is asymptomatic  Chronic kidney disease stage III  Hypertension stable on home meds  Hyperlipidemia stable     Code status: FULL CODE  DVT prophylaxis: heparin    Plan: Discharge tomorrow or day after    Care Plan discussed with: Patient/Family  Anticipated Disposition: Home w/Family  Anticipated Discharge: 24 hours to 48 hours     Hospital Problems  Date Reviewed: 1/4/2022          Codes Class Noted POA    Acute respiratory failure with hypoxia ([facility] [address] ICD-10-CM: J96.01  ICD-9-CM: 518.81  1/4/2022 Unknown        Pneumonia due to COVID-19 virus ICD-10-CM: U07.1, J12.82  ICD-9-CM: 480.8, 079.89  1/4/2022 Unknown                Review of Systems:   A comprehensive review of systems was negative except for that written in the HPI. Vital Signs:    Last 24hrs VS reviewed since prior progress note. Most recent are:  Visit Vitals  /71 (BP 1 Location: Right upper arm, BP Patient Position: At rest)   Pulse 75   Temp 98.8 °F (37.1 °C)   Resp 20   Wt 45.8 kg (100 lb 15.5 oz)   SpO2 94%   BMI 17.33 kg/m²         Intake/Output Summary (Last 24 hours) at 1/6/2022 8117  Last data filed at 1/6/2022 0017  Gross per 24 hour   Intake 1136.25 ml   Output    Net 1136.25 ml        Physical Examination:     I had a face to face encounter with this patient and independently examined them on 1/6/2022 as outlined below:          Constitutional:  No acute distress   ENT:  Oral mucosa moist, oropharynx benign. Resp:  CTA bilaterally. No wheezing/rhonchi/rales. No accessory muscle use   CV:  Regular rhythm, normal rate, no murmurs, gallops, rubs    GI:  Soft, non distended, non tender. normoactive bowel sounds, no hepatosplenomegaly     Musculoskeletal:  No edema, warm, 2+ pulses throughout    Neurologic:  Moves all extremities.   AAOx3, CN II-XII reviewed            Data Review:    Review and/or order of clinical lab test      Labs: Recent Labs     01/06/22  0520 01/04/22  1558   WBC 9.0 10.9   HGB 12.5 14.4   HCT 38.5 44.8    332     Recent Labs     01/06/22  0520 01/05/22  1950 01/04/22  1558     --  141   K 3.4*  --  3.6   *  --  103   CO2 25  --  25   BUN 27*  --  30*   CREA 0.81  --  1.04*   *  --  154*   CA 8.5  --  9.4   MG  --  2.1 2.0   PHOS  --  3.4  --      Recent Labs     01/06/22  0520 01/04/22  1558   ALT 27 51   AP 86 109   TBILI 0.5 1.2*   TP 5.8* 7.4   ALB 2.2* 2.9*   GLOB 3.6 4.5*     Recent Labs     01/06/22  0520 01/04/22  1559   INR 1.1 1.0   PTP 11.2* 10.4   APTT 27.3  --       Recent Labs     01/04/22  1559   FERR 1,506*      No results found for: FOL, RBCF   No results for input(s): PH, PCO2, PO2 in the last 72 hours. No results for input(s): CPK, CKNDX, TROIQ in the last 72 hours.     No lab exists for component: CPKMB  Lab Results   Component Value Date/Time    Cholesterol, total 185 10/11/2021 08:22 AM    HDL Cholesterol 72 10/11/2021 08:22 AM    LDL, calculated 102 (H) 10/11/2021 08:22 AM    LDL, calculated 91 07/31/2020 09:37 AM    Triglyceride 60 10/11/2021 08:22 AM    CHOL/HDL Ratio 3.1 09/28/2011 05:00 AM     No results found for: [facility]  Lab Results   Component Value Date/Time    Color YELLOW/STRAW 01/04/2022 03:58 PM    Appearance CLEAR 01/04/2022 03:58 PM    Specific gravity 1.025 01/04/2022 03:58 PM    Specific gravity 1.017 01/17/2014 06:33 PM    pH (UA) 5.5 01/04/2022 03:58 PM    Protein 100 (A) 01/04/2022 03:58 PM    Glucose Negative 01/04/2022 03:58 PM    Ketone Negative 01/04/2022 03:58 PM    Bilirubin Negative 01/04/2022 03:58 PM    Urobilinogen 0.2 01/04/2022 03:58 PM    Nitrites Negative 01/04/2022 03:58 PM    Leukocyte Esterase TRACE (A) 01/04/2022 03:58 PM    Epithelial cells FEW 01/04/2022 03:58 PM    Bacteria 2+ (A) 01/04/2022 03:58 PM    WBC 10-20 01/04/2022 03:58 PM    RBC 10-20 01/04/2022 03:58 PM         Medications Reviewed:     Current Facility-Administered Medications   Medication Dose Route Frequency    amLODIPine (NORVASC) tablet 5 mg  5 mg Oral DAILY    aspirin chewable tablet 81 mg  81 mg Oral DAILY    carvediloL (COREG) tablet 3.125 mg  3.125 mg Oral BID    clopidogreL (PLAVIX) tablet 75 mg  75 mg Oral DAILY    ezetimibe (ZETIA) tablet 10 mg  10 mg Oral DAILY    famotidine (PEPCID) tablet 20 mg  20 mg Oral BID    rosuvastatin (CRESTOR) tablet 20 mg  20 mg Oral QHS    sodium chloride (NS) flush 5-40 mL  5-40 mL IntraVENous Q8H    sodium chloride (NS) flush 5-40 mL  5-40 mL IntraVENous PRN    cefTRIAXone (ROCEPHIN) 1 g in sterile water (preservative free) 10 mL IV syringe  1 g IntraVENous Q24H    azithromycin (ZITHROMAX) 500 mg in 0.9% sodium chloride 250 mL (VIAL-MATE)  500 mg IntraVENous DAILY    0.9% sodium chloride infusion  75 mL/hr IntraVENous CONTINUOUS    acetaminophen (TYLENOL) tablet 650 mg  650 mg Oral Q4H PRN    bisacodyL (DULCOLAX) tablet 5 mg  5 mg Oral DAILY PRN    heparin (porcine) injection 5,000 Units  5,000 Units SubCUTAneous Q8H    albuterol (PROVENTIL HFA, VENTOLIN HFA, PROAIR HFA) inhaler 1 Puff  1 Puff Inhalation Q4H RT    acetaminophen (TYLENOL) tablet 650 mg  650 mg Oral Q6H PRN    Or    acetaminophen (TYLENOL) suppository 650 mg  650 mg Rectal Q6H PRN    dexAMETHasone (DECADRON) tablet 6 mg  6 mg Oral DAILY    ascorbic acid (vitamin C) (VITAMIN C) tablet 500 mg  500 mg Oral BID    zinc sulfate (ZINCATE) 50 mg zinc (220 mg) capsule 1 Capsule  1 Capsule Oral DAILY    remdesivir 100 mg in 0.9% sodium chloride 250 mL IVPB  100 mg IntraVENous Q24H    ondansetron (ZOFRAN) injection 4 mg  4 mg IntraVENous Q6H PRN     ______________________________________________________________________  EXPECTED LENGTH OF STAY: - - -  ACTUAL LENGTH OF STAY:          2                 Sol Hernandez MD

## 2022-01-06 NOTE — PROGRESS NOTES
Problem: Risk for Spread of Infection  Goal: Prevent transmission of infectious organism to others  Description: Prevent the transmission of infectious organisms to other patients, staff members, and visitors.   1/5/2022 2045 by Oskar Lnaderos RN  Outcome: Progressing Towards Goal  1/5/2022 1814 by Oskar Landeros RN  Outcome: Progressing Towards Goal

## 2022-01-07 VITALS
BODY MASS INDEX: 17.33 KG/M2 | TEMPERATURE: 98.2 F | WEIGHT: 100.97 LBS | DIASTOLIC BLOOD PRESSURE: 66 MMHG | RESPIRATION RATE: 18 BRPM | HEART RATE: 75 BPM | OXYGEN SATURATION: 96 % | SYSTOLIC BLOOD PRESSURE: 129 MMHG

## 2022-01-07 LAB
BACTERIA SPEC CULT: NORMAL
BASOPHILS # BLD: 0 K/UL (ref 0–0.1)
BASOPHILS NFR BLD: 0 % (ref 0–1)
CRP SERPL-MCNC: 1.79 MG/DL (ref 0–0.6)
D DIMER PPP FEU-MCNC: 1.71 MG/L FEU (ref 0–0.65)
DIFFERENTIAL METHOD BLD: ABNORMAL
EOSINOPHIL # BLD: 0 K/UL (ref 0–0.4)
EOSINOPHIL NFR BLD: 0 % (ref 0–7)
ERYTHROCYTE [DISTWIDTH] IN BLOOD BY AUTOMATED COUNT: 12.9 % (ref 11.5–14.5)
FLUID CULTURE, SPNG2: NORMAL
HCT VFR BLD AUTO: 36.9 % (ref 35–47)
HGB BLD-MCNC: 12.2 G/DL (ref 11.5–16)
IMM GRANULOCYTES # BLD AUTO: 0.1 K/UL (ref 0–0.04)
IMM GRANULOCYTES NFR BLD AUTO: 1 % (ref 0–0.5)
L PNEUMO1 AG UR QL IA: NEGATIVE
LYMPHOCYTES # BLD: 0.7 K/UL (ref 0.8–3.5)
LYMPHOCYTES NFR BLD: 9 % (ref 12–49)
MAGNESIUM SERPL-MCNC: 2 MG/DL (ref 1.6–2.4)
MCH RBC QN AUTO: 29 PG (ref 26–34)
MCHC RBC AUTO-ENTMCNC: 33.1 G/DL (ref 30–36.5)
MCV RBC AUTO: 87.9 FL (ref 80–99)
MONOCYTES # BLD: 0.4 K/UL (ref 0–1)
MONOCYTES NFR BLD: 6 % (ref 5–13)
NEUTS SEG # BLD: 6.2 K/UL (ref 1.8–8)
NEUTS SEG NFR BLD: 84 % (ref 32–75)
NRBC # BLD: 0 K/UL (ref 0–0.01)
NRBC BLD-RTO: 0 PER 100 WBC
ORGANISM ID, SPNG3: NORMAL
PLATELET # BLD AUTO: 352 K/UL (ref 150–400)
PLEASE NOTE, SPNG4: NORMAL
PMV BLD AUTO: 9.9 FL (ref 8.9–12.9)
RBC # BLD AUTO: 4.2 M/UL (ref 3.8–5.2)
RBC MORPH BLD: ABNORMAL
S PNEUM AG SPEC QL LA: NEGATIVE
SERVICE CMNT-IMP: NORMAL
SPECIMEN SOURCE: NORMAL
SPECIMEN SOURCE: NORMAL
SPECIMEN, SPNG1: NORMAL
WBC # BLD AUTO: 7.4 K/UL (ref 3.6–11)

## 2022-01-07 PROCEDURE — 85025 COMPLETE CBC W/AUTO DIFF WBC: CPT

## 2022-01-07 PROCEDURE — 94760 N-INVAS EAR/PLS OXIMETRY 1: CPT

## 2022-01-07 PROCEDURE — 36415 COLL VENOUS BLD VENIPUNCTURE: CPT

## 2022-01-07 PROCEDURE — 86140 C-REACTIVE PROTEIN: CPT

## 2022-01-07 PROCEDURE — 74011250636 HC RX REV CODE- 250/636: Performed by: FAMILY MEDICINE

## 2022-01-07 PROCEDURE — 87040 BLOOD CULTURE FOR BACTERIA: CPT

## 2022-01-07 PROCEDURE — 94664 DEMO&/EVAL PT USE INHALER: CPT

## 2022-01-07 PROCEDURE — 83735 ASSAY OF MAGNESIUM: CPT

## 2022-01-07 PROCEDURE — 85379 FIBRIN DEGRADATION QUANT: CPT

## 2022-01-07 PROCEDURE — 94640 AIRWAY INHALATION TREATMENT: CPT

## 2022-01-07 PROCEDURE — 74011250637 HC RX REV CODE- 250/637: Performed by: FAMILY MEDICINE

## 2022-01-07 PROCEDURE — 74011000250 HC RX REV CODE- 250: Performed by: FAMILY MEDICINE

## 2022-01-07 PROCEDURE — 77010033678 HC OXYGEN DAILY

## 2022-01-07 PROCEDURE — 74011250637 HC RX REV CODE- 250/637: Performed by: HOSPITALIST

## 2022-01-07 RX ORDER — ZINC SULFATE 50(220)MG
1 CAPSULE ORAL DAILY
Qty: 7 CAPSULE | Refills: 0 | Status: SHIPPED | OUTPATIENT
Start: 2022-01-08 | End: 2022-03-14

## 2022-01-07 RX ORDER — ASCORBIC ACID 500 MG
500 TABLET ORAL 2 TIMES DAILY
Qty: 14 TABLET | Refills: 0 | Status: SHIPPED | OUTPATIENT
Start: 2022-01-07 | End: 2022-07-06

## 2022-01-07 RX ORDER — DEXAMETHASONE 4 MG/1
TABLET ORAL
Qty: 9 TABLET | Refills: 0 | Status: SHIPPED | OUTPATIENT
Start: 2022-01-08 | End: 2022-01-11 | Stop reason: SDUPTHER

## 2022-01-07 RX ADMIN — ALBUTEROL SULFATE 1 PUFF: 90 AEROSOL, METERED RESPIRATORY (INHALATION) at 04:15

## 2022-01-07 RX ADMIN — OXYCODONE HYDROCHLORIDE AND ACETAMINOPHEN 500 MG: 500 TABLET ORAL at 10:12

## 2022-01-07 RX ADMIN — ALBUTEROL SULFATE 1 PUFF: 90 AEROSOL, METERED RESPIRATORY (INHALATION) at 08:20

## 2022-01-07 RX ADMIN — DEXAMETHASONE 6 MG: 1 TABLET ORAL at 10:12

## 2022-01-07 RX ADMIN — SODIUM CHLORIDE, PRESERVATIVE FREE 10 ML: 5 INJECTION INTRAVENOUS at 16:22

## 2022-01-07 RX ADMIN — ASPIRIN 81 MG CHEWABLE TABLET 81 MG: 81 TABLET CHEWABLE at 10:12

## 2022-01-07 RX ADMIN — ZINC SULFATE 220 MG (50 MG) CAPSULE 1 CAPSULE: CAPSULE at 10:12

## 2022-01-07 RX ADMIN — SODIUM CHLORIDE, PRESERVATIVE FREE 10 ML: 5 INJECTION INTRAVENOUS at 07:55

## 2022-01-07 RX ADMIN — ALBUTEROL SULFATE 1 PUFF: 90 AEROSOL, METERED RESPIRATORY (INHALATION) at 13:13

## 2022-01-07 RX ADMIN — EZETIMIBE 10 MG: 10 TABLET ORAL at 10:13

## 2022-01-07 RX ADMIN — CARVEDILOL 3.12 MG: 3.12 TABLET, FILM COATED ORAL at 10:12

## 2022-01-07 RX ADMIN — FAMOTIDINE 20 MG: 20 TABLET ORAL at 10:12

## 2022-01-07 RX ADMIN — HEPARIN SODIUM 5000 UNITS: 5000 INJECTION INTRAVENOUS; SUBCUTANEOUS at 16:22

## 2022-01-07 RX ADMIN — CLOPIDOGREL BISULFATE 75 MG: 75 TABLET ORAL at 10:12

## 2022-01-07 RX ADMIN — HEPARIN SODIUM 5000 UNITS: 5000 INJECTION INTRAVENOUS; SUBCUTANEOUS at 07:54

## 2022-01-07 RX ADMIN — AMLODIPINE BESYLATE 5 MG: 5 TABLET ORAL at 10:12

## 2022-01-07 RX ADMIN — ALBUTEROL SULFATE 1 PUFF: 90 AEROSOL, METERED RESPIRATORY (INHALATION) at 16:29

## 2022-01-07 NOTE — DISCHARGE SUMMARY
Discharge Summary       PATIENT ID: Susan Lynn  MRN: [id number]   YOB: 1940    DATE OF ADMISSION: 1/4/2022  2:12 PM    DATE OF DISCHARGE: 1/7/2022   PRIMARY CARE PROVIDER: Jessica Cummings MD     ATTENDING PHYSICIAN: Dr Chantel Hi  DISCHARGING PROVIDER: hCantel Hi MD    To contact this individual call [phone] and ask the  to page. If unavailable ask to be transferred the Adult Hospitalist Department. CONSULTATIONS: IP CONSULT TO HOSPITALIST  IP CONSULT TO PULMONOLOGY    PROCEDURES/SURGERIES: * No surgery found *    ADMITTING [address] [place] [address] COURSE:   Acute hypoxic respiratory failure  COVID-19 pneumonia  -Vit C/Zinc  -s/p actemra 1/5  -Decadron, continue  -Remdesivir 1/5--1/9  -Cef/azithro, stop. procalcitonin low     Coagulase negative staph in blood 1/4  -likely contaminant  -Follow repeat blood culture, no growth so far  -Will hold off on antibiotics for now     Diarrhea much improved  Probable acute cystitis. Follow urine culture but the patient is asymptomatic  Chronic kidney disease stage III  Hypertension stable on home meds  Hyperlipidemia stable     Code status: FULL CODE  DVT prophylaxis: heparin        DISCHARGE DIAGNOSES / PLAN:      1.   COVID 19 Pneumonia     ADDITIONAL CARE RECOMMENDATIONS:    Follow up with PMD  Follow up CXRin 4 weeks  Incentive sprometry every hour when awake    PENDING TEST RESULTS:   At the time of discharge the following test results are still pending: none    FOLLOW UP APPOINTMENTS:    Follow-up Information     Follow up With Specialties Details Why Contact Info    Jessica Cummings MD Internal Medicine In 1 week  [address]  [phone]               DIET: Cardiac Diet    ACTIVITY: Activity as tolerated      DISCHARGE MEDICATIONS:  Current Discharge Medication List      START taking these medications    Details   ascorbic acid, vitamin C, (VITAMIN C) 500 mg tablet Take 1 Tablet by mouth two (2) times a day.  Qty: 14 Tablet, Refills: 0  Start date: 1/7/2022      dexAMETHasone (DECADRON) 4 mg tablet 1 tab PO daily  Qty: 9 Tablet, Refills: 0  Start date: 1/8/2022      zinc sulfate (ZINCATE) 50 mg zinc (220 mg) capsule Take 1 Capsule by mouth daily. Qty: 7 Capsule, Refills: 0  Start date: 1/8/2022         CONTINUE these medications which have NOT CHANGED    Details   ezetimibe (ZETIA) 10 mg tablet TAKE 1 TABLET EVERY DAY  Qty: 90 Tablet, Refills: 1      clopidogreL (PLAVIX) 75 mg tab TAKE 1 TABLET EVERY DAY  Qty: 90 Tablet, Refills: 3      rosuvastatin (CRESTOR) 20 mg tablet TAKE 1 TABLET EVERY NIGHT  Qty: 90 Tablet, Refills: 3    Associated Diagnoses: Mixed hyperlipidemia      amLODIPine (NORVASC) 5 mg tablet Take 1 Tablet by mouth daily. Qty: 180 Tablet, Refills: 3    Associated Diagnoses: Essential hypertension      losartan (COZAAR) 50 mg tablet Take 1 Tablet by mouth daily. Qty: 90 Tablet, Refills: 3    Associated Diagnoses: Essential hypertension      carvediloL (COREG) 3.125 mg tablet TAKE 1 TABLET TWICE DAILY  Qty: 180 Tablet, Refills: 3      famotidine (PEPCID) 20 mg tablet TAKE 1 TABLET TWICE DAILY  Qty: 180 Tab, Refills: 3    Associated Diagnoses: Gastroesophageal reflux disease without esophagitis      cyanocobalamin 1,000 mcg tablet Take 1,000 mcg by mouth daily. nicotinic acid (NIACIN) 500 mg tablet Take 500 mg by mouth Daily (before breakfast). cholecalciferol, vitamin D3, (VITAMIN D3) 2,000 unit tab Take 1 Tab by mouth daily. aspirin 81 mg tablet Take 81 mg by mouth daily. NOTIFY YOUR PHYSICIAN FOR ANY OF THE FOLLOWING:   Fever over 101 degrees for 24 hours. Chest pain, shortness of breath, fever, chills, nausea, vomiting, diarrhea, change in mentation, falling, weakness, bleeding. Severe pain or pain not relieved by medications. Or, any other signs or symptoms that you may have questions about.     DISPOSITION:  x  Home With:   OT  PT  HH  RN       Long term SNF/Inpatient Rehab    Independent/assisted living    Hospice    Other:       PATIENT CONDITION AT DISCHARGE:     Functional status    Poor     Deconditioned    x Independent      Cognition    x Lucid     Forgetful     Dementia      Catheters/lines (plus indication)    [place]     PICC     PEG    x None      Code status    x Full code     DNR      PHYSICAL EXAMINATION AT DISCHARGE:  Please see progresss note      CHRONIC MEDICAL DIAGNOSES:  Problem List as of 1/7/2022 Date Reviewed: 1/6/2022          Codes Class Noted - Resolved    Acute respiratory failure with hypoxia ([facility] [address] ICD-10-CM: J96.01  ICD-9-CM: 518.81  1/4/2022 - Present        * (Principal) Pneumonia due to COVID-19 virus ICD-10-CM: U07.1, J12.82  ICD-9-CM: 480.8, 079.89  1/4/2022 - Present        Coronary artery disease involving native coronary artery of native heart without angina pectoris ICD-10-CM: I25.10  ICD-9-CM: 414.01  4/18/2018 - Present        Heart palpitations ICD-10-CM: R00.2  ICD-9-CM: 785.1  10/18/2017 - Present        Hyperlipidemia ICD-10-CM: E78.5  ICD-9-CM: 272.4  10/18/2017 - Present        IFG (impaired fasting glucose) ICD-10-CM: R73.01  ICD-9-CM: 790.21  6/13/2014 - Present        Carotid artery stenosis ICD-10-CM: I65.29  ICD-9-CM: 433.10  3/13/2014 - Present    Overview Signed 3/13/2014  1:41 PM by Krystin Rodriguez MD     Carotid US: Mod LICA plaque <00%, no evidence of Subclavian Steal.               PVC (premature ventricular contraction) ICD-10-CM: I49.3  ICD-9-CM: 427.69  11/7/2013 - Present        GERD (gastroesophageal reflux disease) ICD-10-CM: K21.9  ICD-9-CM: 530.81  2/13/2013 - Present        PAC (premature atrial contraction) ICD-10-CM: I49.1  ICD-9-CM: 427.61  9/27/2011 - Present        CAD (coronary artery disease) ICD-10-CM: I25.10  ICD-9-CM: 414.00  4/4/2011 - Present    Overview Addendum 3/13/2014  1:39 PM by Krystin Rodriguez MD     Stent to RCA and LCX 3/2011; Cath 8/12 with proximal RCA stent - needs lifelong asa and plavix per Dr. Kun Capellan                     Allergic rhinitis ICD-10-CM: J30.9  ICD-9-CM: 477.9  11/3/2010 - Present        HTN (hypertension) ICD-10-CM: I10  ICD-9-CM: 401.9  10/1/2010 - Present        RESOLVED: ACEI/ARB contraindicated ICD-10-CM: Z53.09  ICD-9-CM: V64.1  2/19/2016 - 2/1/2018    Overview Signed 2/19/2016  8:50 AM by Isma Frias MD     Cannot tolerate due to causing hyperkalemia             RESOLVED: CKD (chronic kidney disease), stage III ([email or web address] ICD-10-CM: N18.30  ICD-9-CM: 585.3  9/26/2014 - 2/12/2019    Overview Signed 9/26/2014 10:56 AM by Isma Frias MD     Followed by Dr. 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 Walker Goodman - improved after holding lisinopril and chlorthalidone             RESOLVED: Chest pain, unspecified ICD-10-CM: R07.9  ICD-9-CM: 786.50  11/7/2013 - 3/13/2014        RESOLVED: Angular cheilitis ICD-10-CM: K13.0  ICD-9-CM: 528.5  1/23/2012 - 3/13/2014        RESOLVED: Dizzy ICD-10-CM: R42  ICD-9-CM: 780.4  1/23/2012 - 3/13/2014        RESOLVED: Esophageal reflux ICD-10-CM: K21.9  ICD-9-CM: 530.81  1/23/2012 - 8/3/2021        RESOLVED: Other and unspecified hyperlipidemia ICD-10-CM: E78.5  ICD-9-CM: 272.4  1/23/2012 - 8/3/2021        RESOLVED: Unstable angina ([address] ICD-10-CM: I20.0  ICD-9-CM: 411.1  9/27/2011 - 3/13/2014              Greater than 41 minutes were spent with the patient on counseling and coordination of care    Signed:   Parker Flores MD  1/7/2022  1:45 PM   .

## 2022-01-07 NOTE — PROGRESS NOTES
[address] [facility] Adult  Hospitalist Group                                                                                          Hospitalist Progress Note  Yeni Arredondo MD  Answering service: [phone] from in house phone        Date of Service:  2022  NAME:  Ashli Burger  :  1940  MRN:  [id number]      Admission Summary:   Ashli Burger is a 79 y.o. female who presents with shortness of breath     Patient apparently [facility] complaining of fatigue, malaise, dark urine, cough and shortness of breath associated with diarrhea that started about a week back, patient reports that she was quite short of breath today, reports that her son developed COVID-19 infection 2 weeks ago, reports that she is unvaccinated for COVID-19, when the patient arrived a [facility] ER she was found to be hypoxic, tested positive for Covid and was requested to be admitted to the hospitalist service, patient denies any other complaints or problems.      The patient denies any Headache, blurry vision, sore throat, trouble swallowing, trouble with speech, chest pain,, cough, fever, chills, N/V/, abd pain, urinary symptoms, constipation, recent travels, sick contacts, focal or generalized neurological symptoms,  falls, injuries, rashes, contact with COVID-19 diagnosed patients, hematemesis, melena, hemoptysis, hematuria, rashes, denies starting any new medications and denies any other concerns or problems besides as mentioned above.       Interval history / Subjective:     F/u SOB  On 3l NC, hypoxic on that earlier   Feels much better  Comfortably laying in bed  Assessment & Plan:     Acute hypoxic respiratory failure  COVID-19 pneumonia  -Vit C/Zinc  -s/p actemra   -Decadron, continue  -Remdesivir --  -Cef/azithro, stop. procalcitonin low    Coagulase negative staph in blood   -likely contaminant  -Follow repeat blood culture, no growth so far  -Will hold off on antibiotics for now    Diarrhea much improved  Probable acute cystitis. Follow urine culture but the patient is asymptomatic  Chronic kidney disease stage III  Hypertension stable on home meds  Hyperlipidemia stable     Code status: FULL CODE  DVT prophylaxis: heparin    Plan: Discharge today, likely on home oxygen    Care Plan discussed with: Patient/Family  Anticipated Disposition: Home w/Family  Anticipated Discharge: 24 hours to 48 hours     Hospital Problems  Date Reviewed: 1/6/2022          Codes Class Noted POA    Acute respiratory failure with hypoxia ([facility] [address] ICD-10-CM: J96.01  ICD-9-CM: 518.81  1/4/2022 Unknown        * (Principal) Pneumonia due to COVID-19 virus ICD-10-CM: U07.1, J12.82  ICD-9-CM: 480.8, 079.89  1/4/2022 Yes                Review of Systems:   A comprehensive review of systems was negative except for that written in the HPI. Vital Signs:    Last 24hrs VS reviewed since prior progress note. Most recent are:  Visit Vitals  /68 (BP 1 Location: Left upper arm, BP Patient Position: At rest)   Pulse 88   Temp 98 °F (36.7 °C)   Resp 18   Wt 45.8 kg (100 lb 15.5 oz)   SpO2 93%   BMI 17.33 kg/m²       No intake or output data in the 24 hours ending 01/07/22 1044     Physical Examination:     I had a face to face encounter with this patient and independently examined them on 1/7/2022 as outlined below:          Constitutional:  No acute distress   ENT:  Oral mucosa moist, oropharynx benign. Resp:  CTA bilaterally. No wheezing/rhonchi/rales. No accessory muscle use   CV:  Regular rhythm, normal rate, no murmurs, gallops, rubs    GI:  Soft, non distended, non tender. normoactive bowel sounds, no hepatosplenomegaly     Musculoskeletal:  No edema, warm, 2+ pulses throughout    Neurologic:  Moves all extremities.   AAOx3, CN II-XII reviewed            Data Review:    Review and/or order of clinical lab test      Labs:     Recent Labs     01/07/22  0613 01/06/22  0520   WBC 7.4 9.0   HGB 12.2 12.5   HCT 36.9 38.5    355     Recent Labs     01/07/22  0613 01/06/22  0520 01/05/22  1950 01/04/22  1558   NA  --  142  --  141   K  --  3.4*  --  3.6   CL  --  111*  --  103   CO2  --  25  --  25   BUN  --  27*  --  30*   CREA  --  0.81  --  1.04*   GLU  --  130*  --  154*   CA  --  8.5  --  9.4   MG 2.0  --  2.1 2.0   PHOS  --   --  3.4  --      Recent Labs     01/06/22  0520 01/04/22  1558   ALT 27 51   AP 86 109   TBILI 0.5 1.2*   TP 5.8* 7.4   ALB 2.2* 2.9*   GLOB 3.6 4.5*     Recent Labs     01/06/22  0520 01/04/22  1559   INR 1.1 1.0   PTP 11.2* 10.4   APTT 27.3  --       Recent Labs     01/04/22  1559   FERR 1,506*      No results found for: FOL, RBCF   No results for input(s): PH, PCO2, PO2 in the last 72 hours. No results for input(s): CPK, CKNDX, TROIQ in the last 72 hours.     No lab exists for component: CPKMB  Lab Results   Component Value Date/Time    Cholesterol, total 185 10/11/2021 08:22 AM    HDL Cholesterol 72 10/11/2021 08:22 AM    LDL, calculated 102 (H) 10/11/2021 08:22 AM    LDL, calculated 91 07/31/2020 09:37 AM    Triglyceride 60 10/11/2021 08:22 AM    CHOL/HDL Ratio 3.1 09/28/2011 05:00 AM     No results found for: [facility]  Lab Results   Component Value Date/Time    Color YELLOW/STRAW 01/04/2022 03:58 PM    Appearance CLEAR 01/04/2022 03:58 PM    Specific gravity 1.025 01/04/2022 03:58 PM    Specific gravity 1.017 01/17/2014 06:33 PM    pH (UA) 5.5 01/04/2022 03:58 PM    Protein 100 (A) 01/04/2022 03:58 PM    Glucose Negative 01/04/2022 03:58 PM    Ketone Negative 01/04/2022 03:58 PM    Bilirubin Negative 01/04/2022 03:58 PM    Urobilinogen 0.2 01/04/2022 03:58 PM    Nitrites Negative 01/04/2022 03:58 PM    Leukocyte Esterase TRACE (A) 01/04/2022 03:58 PM    Epithelial cells FEW 01/04/2022 03:58 PM    Bacteria 2+ (A) 01/04/2022 03:58 PM    WBC 10-20 01/04/2022 03:58 PM    RBC 10-20 01/04/2022 03:58 PM         Medications Reviewed:     Current Facility-Administered Medications   Medication Dose Route Frequency    amLODIPine (NORVASC) tablet 5 mg  5 mg Oral DAILY    aspirin chewable tablet 81 mg  81 mg Oral DAILY    carvediloL (COREG) tablet 3.125 mg  3.125 mg Oral BID    clopidogreL (PLAVIX) tablet 75 mg  75 mg Oral DAILY    ezetimibe (ZETIA) tablet 10 mg  10 mg Oral DAILY    rosuvastatin (CRESTOR) tablet 20 mg  20 mg Oral QHS    famotidine (PEPCID) tablet 20 mg  20 mg Oral DAILY    sodium chloride (NS) flush 5-40 mL  5-40 mL IntraVENous Q8H    sodium chloride (NS) flush 5-40 mL  5-40 mL IntraVENous PRN    bisacodyL (DULCOLAX) tablet 5 mg  5 mg Oral DAILY PRN    heparin (porcine) injection 5,000 Units  5,000 Units SubCUTAneous Q8H    albuterol (PROVENTIL HFA, VENTOLIN HFA, PROAIR HFA) inhaler 1 Puff  1 Puff Inhalation Q4H RT    acetaminophen (TYLENOL) tablet 650 mg  650 mg Oral Q6H PRN    Or    acetaminophen (TYLENOL) suppository 650 mg  650 mg Rectal Q6H PRN    dexAMETHasone (DECADRON) tablet 6 mg  6 mg Oral DAILY    ascorbic acid (vitamin C) (VITAMIN C) tablet 500 mg  500 mg Oral BID    zinc sulfate (ZINCATE) 50 mg zinc (220 mg) capsule 1 Capsule  1 Capsule Oral DAILY    remdesivir 100 mg in 0.9% sodium chloride 250 mL IVPB  100 mg IntraVENous Q24H    ondansetron (ZOFRAN) injection 4 mg  4 mg IntraVENous Q6H PRN     ______________________________________________________________________  EXPECTED LENGTH OF STAY: 5d 9h  ACTUAL LENGTH OF STAY:          Ana Paula Thompson MD

## 2022-01-07 NOTE — PROGRESS NOTES
Bedside and Verbal shift change report given to Nahomy Victor RN (oncoming nurse) by KIRIT Arthur (offgoing nurse). Report included the following information SBAR, Kardex, ED Summary, Procedure Summary, Intake/Output, MAR, Recent Results and Cardiac Rhythm NSR.

## 2022-01-07 NOTE — PROGRESS NOTES
Pulse oximetry assessment   96% at rest on room air (if 88% or less, skip next steps)  89% while ambulating on room air  96% at rest on 2LPM  91% while ambulating on 2LPM

## 2022-01-07 NOTE — PROGRESS NOTES
Transition of Care Plan   RUR- 9% Moderate Risk   DISPOSITION: The disposition plan is home with family assistance.  F/U with PCP/Specialist     Transport: Family - Son - Berto Crooks    At 3:30pm - CM contacted patients son to provide update. Patient is medically stable for discharge. Patients son reports that he gets off around 5:30pm, as he works in [employer]. CM provided nurses station number to patients son. CM provided this update to patients assigned nurse.     Ovidio Lawton, MSW, CRM, LMHP-e

## 2022-01-07 NOTE — PROGRESS NOTES
Bedside shift change report given to Raj Garcia (oncoming nurse) by Alirio Burks (offgoing nurse). Report included the following information SBAR, Kardex, Intake/Output, MAR and Recent Results.

## 2022-01-07 NOTE — DISCHARGE INSTRUCTIONS
Discharge Instructions       PATIENT ID: Blake Brown  MRN: [id number]   YOB: 1940    DATE OF ADMISSION: 1/4/2022  2:12 PM    DATE OF DISCHARGE: 1/7/2022    PRIMARY CARE PROVIDER: Carol Sutherland MD     ATTENDING PHYSICIAN: Brayan Cruz MD  DISCHARGING PROVIDER: Agueda Enriquez MD    To contact this individual call [phone] and ask the  to page. If unavailable ask to be transferred the Adult Hospitalist Department. DISCHARGE DIAGNOSES   COVID 19 Pneumonia    CONSULTATIONS: IP CONSULT TO HOSPITALIST  IP CONSULT TO PULMONOLOGY    PROCEDURES/SURGERIES: * No surgery found *    PENDING TEST RESULTS:   At the time of discharge the following test results are still pending: none    FOLLOW UP APPOINTMENTS:   Follow-up Information     Follow up With Specialties Details Why Contact Info    Carol Sutherland MD Internal Medicine In 1 week  [address]  [phone]             ADDITIONAL CARE RECOMMENDATIONS:   Follow up with PMD  Follow up CXRin 4 weeks  Incentive sprometry every hour when awake    DIET: Cardiac Diet    ACTIVITY: Activity as tolerated      DISCHARGE MEDICATIONS:   See Medication Reconciliation Form    · It is important that you take the medication exactly as they are prescribed. · Keep your medication in the bottles provided by the pharmacist and keep a list of the medication names, dosages, and times to be taken in your wallet. · Do not take other medications without consulting your doctor. NOTIFY YOUR PHYSICIAN FOR ANY OF THE FOLLOWING:   Fever over 101 degrees for 24 hours. Chest pain, shortness of breath, fever, chills, nausea, vomiting, diarrhea, change in mentation, falling, weakness, bleeding. Severe pain or pain not relieved by medications. Or, any other signs or symptoms that you may have questions about.       DISPOSITION:  x  Home With:   OT  PT  HH  RN       SNF/Inpatient Rehab/LTAC    Independent/assisted living Hospice    Other:     CDMP Checked:   Yes x     PROBLEM LIST Updated:  Yes x       Signed:   Sol Hernandez MD  1/7/2022  1:44 PM

## 2022-01-07 NOTE — PROGRESS NOTES
Problem: Risk for Spread of Infection  Goal: Prevent transmission of infectious organism to others  Description: Prevent the transmission of infectious organisms to other patients, staff members, and visitors. Outcome: Progressing Towards Goal     Problem: Gas Exchange - Impaired  Goal: Absence of hypoxia  Outcome: Progressing Towards Goal  Goal: Promote optimal lung function  Outcome: Progressing Towards Goal     Problem: Breathing Pattern - Ineffective  Goal: Ability to achieve and maintain a regular respiratory rate  Outcome: Progressing Towards Goal     Problem: Falls - Risk of  Goal: *Absence of Falls  Description: Document Miriam Ground Fall Risk and appropriate interventions in the flowsheet.   Outcome: Progressing Towards Goal  Note: Fall Risk Interventions:  Mobility Interventions: Bed/chair exit alarm,Communicate number of staff needed for ambulation/transfer,Patient to call before getting OOB,PT Consult for mobility concerns,PT Consult for assist device competence                             Problem: Patient Education: Go to Patient Education Activity  Goal: Patient/Family Education  Outcome: Progressing Towards Goal

## 2022-01-08 NOTE — PROGRESS NOTES
I have reviewed discharge instructions with the patient. The patient verbalized understanding. Patient discharged to home with family via private vehicle. Hard copy of prescriptions with patient.

## 2022-01-10 ENCOUNTER — PATIENT OUTREACH (OUTPATIENT)
Dept: CASE MANAGEMENT | Age: 82
End: 2022-01-10

## 2022-01-10 LAB
BACTERIA SPEC CULT: ABNORMAL
BACTERIA SPEC CULT: NORMAL
SERVICE CMNT-IMP: ABNORMAL
SERVICE CMNT-IMP: NORMAL

## 2022-01-10 NOTE — PROGRESS NOTES
Patient contacted regarding COVID-19 risk, exposure, diagnosis, pulse oximeter ordered at discharge, monoclonal antibody infusion follow up. Discussed COVID-19 related testing which was available at this time. Test results were positive. Patient informed of results, if available? yes. Care Transition Nurse contacted the patient by telephone to perform post discharge assessment. Call within 2 business days of discharge: Yes Verified name and  with patient as identifiers. Provided introduction to self, and explanation of the CTN/ACM role, and reason for call due to risk factors for infection and/or exposure to COVID-19. Symptoms reviewed with patient who verbalized the following symptoms: fatigue, pain or aching joints, cough and no worsening symptoms. Reports she is a lot better each day. No sob, no fever, still some coughing, fatigue and occasionally hurts to take a deep breath. Can see symptoms subsiding. Due to no new or worsening symptoms encounter was not routed to provider for escalation. Discussed follow-up appointments. If no appointment was previously scheduled, appointment scheduling offered:  yes. [facility] follow up appointment(s):   Future Appointments   Date Time Provider Malachi Millard   2022  3:30 PM MD FELICITA Ruiz AMB   3/14/2022 11:30 AM MD FELICITA Ruiz AMB     Non-[facility] follow up appointment(s): kvng    Interventions to address risk factors: Scheduled appointment with PCP- at 3:30pm and Obtained and reviewed discharge summary and/or continuity of care documents     Advance Care Planning:   Does patient have an Advance Directive: not on file. Educated patient about risk for severe COVID-19 due to risk factors according to CDC guidelines. CTN reviewed discharge instructions, medical action plan and red flag symptoms with the patient who verbalized understanding. Discussed COVID vaccination status: yes.  Education provided on COVID-19 vaccination as appropriate. Discussed exposure protocols and quarantine with CDC Guidelines. Patient was given an opportunity to verbalize any questions and concerns and agrees to contact CTN or health care provider for questions related to their healthcare. Reviewed and educated patient on any new and changed medications related to discharge diagnosis     Was patient discharged with a pulse oximeter? no     CTN provided contact information. Plan for follow-up call in 5-7 days based on severity of symptoms and risk factors.

## 2022-01-11 ENCOUNTER — TELEPHONE (OUTPATIENT)
Dept: INTERNAL MEDICINE CLINIC | Age: 82
End: 2022-01-11

## 2022-01-11 RX ORDER — DEXAMETHASONE 4 MG/1
TABLET ORAL
Qty: 9 TABLET | Refills: 0 | Status: SHIPPED | OUTPATIENT
Start: 2022-01-11 | End: 2022-03-14

## 2022-01-11 NOTE — TELEPHONE ENCOUNTER
The patient called and verified their name and date of birth. She was discharged from the hospital on 01.07 with COVID. Was supposed to get 3 prescriptions when she left and only got paper scripts for 2 of them. [facility] and they stated they do not have the prescription for Dexamethasone. She was informed to call the hospital or either her PCP to get the prescription. She already called the hospital and they informed her that she is no longer in the system. Would have to come back in for another evaluation due the provider not being in the hospital at the time. She does not want to go back to the ER and wanted to know if Dr. Jayden Cassidy could send in the prescription.

## 2022-01-12 LAB
BACTERIA SPEC CULT: NORMAL
SERVICE CMNT-IMP: NORMAL

## 2022-01-13 ENCOUNTER — VIRTUAL VISIT (OUTPATIENT)
Dept: INTERNAL MEDICINE CLINIC | Age: 82
End: 2022-01-13
Payer: MEDICARE

## 2022-01-13 DIAGNOSIS — U07.1 PNEUMONIA DUE TO COVID-19 VIRUS: Primary | ICD-10-CM

## 2022-01-13 DIAGNOSIS — Z09 HOSPITAL DISCHARGE FOLLOW-UP: ICD-10-CM

## 2022-01-13 DIAGNOSIS — J12.82 PNEUMONIA DUE TO COVID-19 VIRUS: Primary | ICD-10-CM

## 2022-01-13 DIAGNOSIS — I25.111 CORONARY ARTERY DISEASE INVOLVING NATIVE CORONARY ARTERY OF NATIVE HEART WITH ANGINA PECTORIS WITH DOCUMENTED SPASM (HCC): ICD-10-CM

## 2022-01-13 DIAGNOSIS — J96.01 ACUTE RESPIRATORY FAILURE WITH HYPOXIA (HCC): ICD-10-CM

## 2022-01-13 PROCEDURE — 99442 PR PHYS/QHP TELEPHONE EVALUATION 11-20 MIN: CPT | Performed by: INTERNAL MEDICINE

## 2022-01-13 PROCEDURE — 1111F DSCHRG MED/CURRENT MED MERGE: CPT | Performed by: INTERNAL MEDICINE

## 2022-01-13 NOTE — PROGRESS NOTES
Virtual Video Transitional Care Management Progress Note    Patient: Jordi Mclean  : 1940  PCP: Nancy Heaton MD    Date of office visit: 2022   Date of admission: 22  Date of discharge: 22  Hospital: [facility]    Call initiated w/i 2 business dates of discharge: Yes   Date of the most recent call to the patient: 1/10/2022  1:00 PM      Assessment/Plan:   Diagnoses and all orders for this visit:    1. Pneumonia due to COVID-19 virus  Comments:  clinically improved - repeat CXR in about 4 mos - will mail order - will complete dexamethasone  Orders:  -     XR CHEST PA LAT; Future    2. Coronary artery disease involving native coronary artery of native heart with angina pectoris with documented spasm [facility])  Comments:  managed by cardiology Dr. Iliana Ann    3. Acute respiratory failure with hypoxia (HCC)  Comments:  due to #2 - hypoxia resolved prior to discharge    4. Hospital discharge follow-up  -     [place] DISCHARGE MEDS RECONCILED W/ CURRENT OUTPATIENT MED LIST      Follow-up and Dispositions    · Return if symptoms worsen or fail to improve, for keep appt in march . Subjective:   Jordi Mclean is a 79 y.o. female presenting today for follow-up after hospital discharge. This encounter and supporting documentation was reviewed if available. Medication reconciliation was performed today. The main problem requiring admission was covid-19 pneumonia.    Complications during admission: none    Was admitted with hypoxia, diarrhea  Treated with vit c/zn  actemra  remdesivir  Cef/azithromycin which was dc'd due to low procalcitonin    Had coag neg staph in  bottles - felt due to contaminant - repeat cx was negative    Sent home with dexamethasone x 9 days    Interval history/Current status:   Feeling better, able to get up and move around now, but still needs to rest  No more diarrhea  No n/v or abdominal pain  Appetite has picked up, still some decreased sense of taste  No chest pain or sob and cough is much better  Using albuterol once daily which helps some  Staying hydrated  Son has been staying with her this week  Legs still weak, has to be careful not to fall  Has been using incentive spirometer - will try to use 3-4 x per day  Admitting symptoms have: significantly improved      Medications marked \"taking\" at this time:  Prior to Admission medications    Medication Sig Start Date End Date Taking? Authorizing Provider   dexAMETHasone (DECADRON) 4 mg tablet 1 tab PO daily 1/11/22  Yes Luzmaria CAPPS MD   ascorbic acid, vitamin C, (VITAMIN C) 500 mg tablet Take 1 Tablet by mouth two (2) times a day. 1/7/22  Yes Marjorie Garnett MD   zinc sulfate (ZINCATE) 50 mg zinc (220 mg) capsule Take 1 Capsule by mouth daily. 1/8/22  Yes Marjorie Garnett MD   ezetimibe (ZETIA) 10 mg tablet TAKE 1 TABLET EVERY DAY 8/9/21  Yes Amanda Moise MD   clopidogreL (PLAVIX) 75 mg tab TAKE 1 TABLET EVERY DAY 6/4/21  Yes Amanda Moise MD   rosuvastatin (CRESTOR) 20 mg tablet TAKE 1 TABLET EVERY NIGHT 6/4/21  Yes Felisa Brown MD   amLODIPine (NORVASC) 5 mg tablet Take 1 Tablet by mouth daily. 6/3/21  Yes Amanda Moise MD   losartan (COZAAR) 50 mg tablet Take 1 Tablet by mouth daily. 6/3/21  Yes Amanda Moise MD   carvediloL (COREG) 3.125 mg tablet TAKE 1 TABLET TWICE DAILY 5/25/21  Yes Amanda Moise MD   famotidine (PEPCID) 20 mg tablet TAKE 1 TABLET TWICE DAILY 4/22/21  Yes Felisa Brown MD   cyanocobalamin 1,000 mcg tablet Take 1,000 mcg by mouth daily. Yes Provider, Historical   nicotinic acid (NIACIN) 500 mg tablet Take 500 mg by mouth Daily (before breakfast). Yes Provider, Historical   cholecalciferol, vitamin D3, (VITAMIN D3) 2,000 unit tab Take 1 Tab by mouth daily. Yes Provider, Historical   aspirin 81 mg tablet Take 81 mg by mouth daily.    Yes Provider, Historical        ROS   Per HPI        Objective:     Patient-Reported Vitals 1/13/2022   Patient-Reported Weight -   Patient-Reported Height -   Patient-Reported Pulse 76   Patient-Reported Temperature 98.2   Patient-Reported Systolic  274   Patient-Reported Diastolic 78        Physical Exam   N/A - phone visit    We discussed the expected course, resolution and complications of the diagnosis(es) in detail. Medication risks, benefits, costs, interactions, and alternatives were discussed as indicated. I advised her to contact the office if her condition worsens, changes or fails to improve as anticipated. She expressed understanding with the diagnosis(es) and plan. Francisco Garcia, who was evaluated through a synchronous (real-time) audio encounter and/or her healthcare decision maker, is aware that it is a billable service, with coverage as determined by her insurance carrier. She provided verbal consent to proceed: Yes, and patient identification was verified. It was conducted pursuant to the emergency declaration under the [address] [place] [address], [address] [place] [address] authority and the [employer] General Act. A caregiver was present when appropriate. Ability to conduct physical exam was limited. I was in the office. The patient was at home.        Lila Maravilla MD

## 2022-01-13 NOTE — PROGRESS NOTES
Jake Hernandez  Identified pt with two pt identifiers(name and ). Chief Complaint   Patient presents with   [facility] Follow Up       Reviewed record In preparation for visit and have obtained necessary documentation. 1. Have you been to the ER, urgent care clinic or hospitalized since your last visit? No     2. Have you seen or consulted any other health care providers outside of the [address] [place] [address] since your last visit? Include any pap smears or colon screening. No    Vitals reviewed with provider. Health Maintenance reviewed:     Health Maintenance Due   Topic    Flu Vaccine (1)          Wt Readings from Last 3 Encounters:   22 100 lb 15.5 oz (45.8 kg)   21 118 lb 9.6 oz (53.8 kg)   21 122 lb (55.3 kg)        Temp Readings from Last 3 Encounters:   22 98.2 °F (36.8 °C)   21 97.9 °F (36.6 °C) (Oral)   21 98 °F (36.7 °C) (Oral)        BP Readings from Last 3 Encounters:   22 129/66   21 136/77   21 130/80        Pulse Readings from Last 3 Encounters:   22 75   21 69   21 70        Vitals:    22 1500   PainSc:   0 - No pain          Learning Assessment:   :       Learning Assessment 2014   PRIMARY LEARNER Patient   HIGHEST LEVEL OF EDUCATION - PRIMARY LEARNER  DID NOT GRADUATE HIGH SCHOOL   BARRIERS PRIMARY LEARNER NONE   CO-LEARNER CAREGIVER No   PRIMARY LANGUAGE ENGLISH   LEARNER PREFERENCE PRIMARY DEMONSTRATION   ANSWERED BY patient   RELATIONSHIP SELF        Depression Screening:   :       3 most recent PHQ Screens 2022   Little interest or pleasure in doing things Not at all   Feeling down, depressed, irritable, or hopeless Not at all   Total Score PHQ 2 0        Fall Risk Assessment:   :       Fall Risk Assessment, last 12 mths 2021   Able to walk? Yes   Fall in past 12 months? 0   Do you feel unsteady?  0   Are you worried about falling 0   Number of falls in past 12 months -   Fall with injury? -        Abuse Screening:   :       Abuse Screening Questionnaire 1/4/2022 8/28/2020 8/21/2019 8/10/2018 3/6/2017 3/16/2016 6/13/2014   Do you ever feel afraid of your partner? N N N N N N N   Are you in a relationship with someone who physically or mentally threatens you? N N N N N N N   Is it safe for you to go home?  Y Y Y Y Y Y Y        ADL Screening:   :       ADL Assessment 1/13/2022   Feeding yourself No Help Needed   Getting from bed to chair No Help Needed   Getting dressed No Help Needed   Bathing or showering No Help Needed   Walk across the room (includes cane/walker) No Help Needed   Using the telphone No Help Needed   Taking your medications No Help Needed   Preparing meals No Help Needed   Managing money (expenses/bills) No Help Needed   Moderately strenuous housework (laundry) No Help Needed   Shopping for personal items (toiletries/medicines) No Help Needed   Shopping for groceries No Help Needed   Driving No Help Needed   Climbing a flight of stairs No Help Needed   Getting to places beyond walking distances No Help Needed

## 2022-01-20 ENCOUNTER — PATIENT OUTREACH (OUTPATIENT)
Dept: CASE MANAGEMENT | Age: 82
End: 2022-01-20

## 2022-01-20 NOTE — PROGRESS NOTES
Follow Up Call    Challenges to be reviewed by the provider   Additional needs identified to be addressed with provider: no  none           Encounter was not routed to provider for escalation. Method of communication with provider: chart routing. Contacted the patient by telephone to follow up after hospital visit. Reports she feels better each day. Still tires easily but getting stronger. No fever,no sob and no coughing. Status: improved  Interventions to address identified needs: Education of patient/family/caregiver/guardian to support self-management-reminded to stay hydrated and get plenty of rest. Build up strength gradually with walking in house. [address] follow up appointment(s):   Future Appointments   Date Time Provider Malachi Gloveri   3/14/2022 11:30 AM Linda Rahman MD [facility]     Non-BS follow up appointment(s): na  Follow up appointment completed? yes. Provided contact information for future needs. Plan for follow-up call in 7-10 days based on severity of symptoms and risk factors.   Plan for next call: symptom management-assess current symptoms     Kathya Pandya RN

## 2022-01-21 ENCOUNTER — TELEPHONE (OUTPATIENT)
Dept: INTERNAL MEDICINE CLINIC | Age: 82
End: 2022-01-21

## 2022-01-21 NOTE — TELEPHONE ENCOUNTER
Can increase losartan to 50mg bid until /90 or less.      If doesn't feel better with rest, better BP then go to ED

## 2022-01-21 NOTE — TELEPHONE ENCOUNTER
The patient called and verified them by name and date of birth. Just got out of the hospital with COVID. For the last couple of weeks bp has been elevated.  passed at the end of December. They are supposed to bury him tomorrow and she has family flying in. Does not check blood pressure everyday. Feels \"woozie\" in the head, dizzy and leg feel stiff and numb.      Reading from yesterday: 189/95

## 2022-01-28 ENCOUNTER — PATIENT OUTREACH (OUTPATIENT)
Dept: CASE MANAGEMENT | Age: 82
End: 2022-01-28

## 2022-01-28 NOTE — PROGRESS NOTES
Follow Up Call    Challenges to be reviewed by the provider   Additional needs identified to be addressed with provider: yes  Says bp is better since increasing Losartan but still can go up at times but then comes down. Advised to record results and call pcp with those records. Encounter was routed to provider for escalation. Method of communication with provider: chart routing. Contacted the patient by telephone to follow up after hospital visit. Says she feels better. No symptoms of covid. BP sometimes goes up and then down. Did increase dose of Losartan per  recommendation to bid. No longer dizzy or woozy. Advised to record bp results and share with pcp for further recs. Status: improved  Interventions to address identified needs: Education of patient/family/caregiver/guardian to support self-management-reminded to check bp 1-2 times a day and record. If over 140/90 on bid Losartan, notify pcp. If below 140/90, may need to adjust medicine again. [facility] follow up appointment(s):   Future Appointments   Date Time Provider Malachi Millard   3/14/2022 11:30 AM Kay Carpenter MD [facility]     [facility] follow up appointment(s): na   Follow up appointment completed? yes. Provided contact information for future needs. Plan for follow-up call in 7-10 days based on severity of symptoms and risk factors.   Plan for next call: symptom management-assess current symptoms and bp results     Hayden Ya RN

## 2022-02-09 ENCOUNTER — PATIENT OUTREACH (OUTPATIENT)
Dept: CASE MANAGEMENT | Age: 82
End: 2022-02-09

## 2022-02-09 NOTE — PROGRESS NOTES
Patient resolved from [address] Transitions episode on 2/9/22. Discussed COVID-19 related testing which was available at this time. Test results were positive. Patient informed of results, if available? yes     Patient/family has been provided the following resources and education related to COVID-19:                         Signs, symptoms and red flags related to COVID-19            [facility] exposure and quarantine guidelines            [facility] exposure contact - [phone]            Contact for their local Department of Health                 Patient currently reports that the following symptoms have improved:  feels like she has completely recovered, just gets tired at times, so will stop and rest, and then feels better. .  Had mentioned in prior calls, bp had been up and had discussed with pcp, med changes made. Reports bp better but has occasional high results. Has appt to see her cardio this month and will take her readings with her. Encouraged to follow low salt diet. No further outreach scheduled with this CTN/ACM/LPN/HC/ MA. Episode of Care resolved. Patient has this CTN/ACM/LPN/HC/MA contact information if future needs arise.

## 2022-02-17 ENCOUNTER — DOCUMENTATION ONLY (OUTPATIENT)
Dept: CARDIOLOGY | Age: 82
End: 2022-02-17

## 2022-02-17 NOTE — PROGRESS NOTES
PCP: Dr. Courtney Strauss    HPI: Erma Ahuja is a 79 y.o. female who was seen on 6/3/2021. Patient is being seen today for HTN, GERD, XOL. Patient visited the hospital 01/03/22 for positive COVID test. Patient went to [facility] for four days. Patient states she is feeling a lot better since getting over [place]. Limited chest pain. No shortness. Palpitations noted. Dizziness noted while patient also had a very high blood pressure reading. This happened during her being sick with COVID. No nausea or vomiting noted. No edema reported or shown. Patient reports numb toes in her right foot. Didn't notice until after getting sick with COVID. Patient also feels her legs are often weaker than normal and her balance seems off. No edema. I encouraged her to get back to exercising, etc, she is having trouble with motivation right now. Patients  passed away Dec 2021. This was when blood pressure was high and dizziness was happening. Dr. Eden Wiley increased losartan from once to twice a day. No chest pain. Dizziness has improved since blood pressure medication was adjusted but still having spikes of blood pressure intermittently. Blood pressure is up today, she can feel when it is elevated. Blood pressure at home 130-160s/60-70s , occasionally high. Admits to some salty snacks. Patient states she has mild shortness of breath on exertion this is not worse or progressive. She reports intermittent palpitations. She reports dizziness on position change. BP generally doing fine. High at the dentist but otherwise fine. Compression stockings ordered        Hgb13.9 crt nl, k 4.7 LFT good,   HDL 72 A1C 6.3         Assessment /Plan         1. HTN -borderline high  2.  CAD- stable, s/p TRANG to RCA and mid LAD 3/11, cath 8/12 with proximal RCA stent, no ischemia on stress test in 2018  -lifelong plavix and aspirin (her decision after discussion of risks and benefits)  -continue coreg and statin  3. Palpitations- better on coreg, still had v-bigemeny on last holter  4. Hyperlipidemia- LDL 86 in 8/19 on crestor 20mg daily and zetia  -declined PCSK9 inhibition in the past   5. Anxiety- better since she stopped checking her blood pressure at home  6. Reflux- on pepcid   7. Vit D deficiency 2000u/day  8. Impaired glucose tolerance- 5.9%, exercising regularly   9. CKD Stage 3-stable, crt 1-1.1, seen by Dr. Mary Olsen. -hyperkalemia in the past improved with stopping spironolactone  -last K 5.2 in 8/19  10. Mild to moderate mitral regurgitation   11. PAD - bilateral carotid bruits, mild Bety and moderate LICA  12. Possible SS- was above watch for now and will get loop if sx progress.    13. Edema-go ahead and reduce amlodipine and increase losartan       Echo 8/20 EF 55% mild MR PAP 33    Carotid 8/28 Lica 52-65%, Bety <01%  Stress nuc 4/18 wnl  Echo 11/15 EF 60%, mild TR, PAP 25, mild-mod MR  Carotid US: Mod LICA plaque <95%, no evidence of Subclavian Steal.  Stress nuc: 1/14 normal, EF 69%  Echo 9/12 EF 60, mod MR, mild TR  Cath 2012 RCA stent     Fhx no early cad  Soc no tob, no etoh

## 2022-03-14 ENCOUNTER — OFFICE VISIT (OUTPATIENT)
Dept: INTERNAL MEDICINE CLINIC | Age: 82
End: 2022-03-14
Payer: MEDICARE

## 2022-03-14 VITALS
SYSTOLIC BLOOD PRESSURE: 162 MMHG | HEIGHT: 64 IN | RESPIRATION RATE: 16 BRPM | HEART RATE: 75 BPM | WEIGHT: 123 LBS | TEMPERATURE: 97.5 F | OXYGEN SATURATION: 97 % | DIASTOLIC BLOOD PRESSURE: 54 MMHG | BODY MASS INDEX: 21 KG/M2

## 2022-03-14 DIAGNOSIS — K21.9 GASTROESOPHAGEAL REFLUX DISEASE, UNSPECIFIED WHETHER ESOPHAGITIS PRESENT: ICD-10-CM

## 2022-03-14 DIAGNOSIS — I10 ESSENTIAL HYPERTENSION: Primary | ICD-10-CM

## 2022-03-14 PROBLEM — J96.01 ACUTE RESPIRATORY FAILURE WITH HYPOXIA (HCC): Status: RESOLVED | Noted: 2022-01-04 | Resolved: 2022-03-14

## 2022-03-14 PROCEDURE — G8432 DEP SCR NOT DOC, RNG: HCPCS | Performed by: INTERNAL MEDICINE

## 2022-03-14 PROCEDURE — 99214 OFFICE O/P EST MOD 30 MIN: CPT | Performed by: INTERNAL MEDICINE

## 2022-03-14 PROCEDURE — G8753 SYS BP > OR = 140: HCPCS | Performed by: INTERNAL MEDICINE

## 2022-03-14 PROCEDURE — G8536 NO DOC ELDER MAL SCRN: HCPCS | Performed by: INTERNAL MEDICINE

## 2022-03-14 PROCEDURE — G8427 DOCREV CUR MEDS BY ELIG CLIN: HCPCS | Performed by: INTERNAL MEDICINE

## 2022-03-14 PROCEDURE — 1101F PT FALLS ASSESS-DOCD LE1/YR: CPT | Performed by: INTERNAL MEDICINE

## 2022-03-14 PROCEDURE — G8400 PT W/DXA NO RESULTS DOC: HCPCS | Performed by: INTERNAL MEDICINE

## 2022-03-14 PROCEDURE — 1090F PRES/ABSN URINE INCON ASSESS: CPT | Performed by: INTERNAL MEDICINE

## 2022-03-14 PROCEDURE — G8420 CALC BMI NORM PARAMETERS: HCPCS | Performed by: INTERNAL MEDICINE

## 2022-03-14 PROCEDURE — G8754 DIAS BP LESS 90: HCPCS | Performed by: INTERNAL MEDICINE

## 2022-03-14 RX ORDER — LOSARTAN POTASSIUM 50 MG/1
50 TABLET ORAL 2 TIMES DAILY
Qty: 180 TABLET | Refills: 3 | Status: SHIPPED | OUTPATIENT
Start: 2022-03-14

## 2022-03-14 RX ORDER — CARVEDILOL 6.25 MG/1
6.25 TABLET ORAL 2 TIMES DAILY WITH MEALS
Qty: 90 TABLET | Refills: 1 | Status: SHIPPED | OUTPATIENT
Start: 2022-03-14 | End: 2022-05-12 | Stop reason: SDUPTHER

## 2022-03-14 NOTE — PROGRESS NOTES
Arsenio Guevara  Identified pt with two pt identifiers(name and ). Chief Complaint   Patient presents with    Blood Pressure Check     Room 2B //     GERD       Reviewed record In preparation for visit and have obtained necessary documentation. 1. Have you been to the ER, urgent care clinic or hospitalized since your last visit? No     2. Have you seen or consulted any other health care providers outside of the [address] [place] [address] since your last visit? Include any pap smears or colon screening. No    Patient does not have an advance directive. Vitals reviewed with provider. Health Maintenance reviewed: There are no preventive care reminders to display for this patient.        Wt Readings from Last 3 Encounters:   22 123 lb (55.8 kg)   22 100 lb 15.5 oz (45.8 kg)   21 118 lb 9.6 oz (53.8 kg)        Temp Readings from Last 3 Encounters:   22 97.5 °F (36.4 °C) (Oral)   22 98.2 °F (36.8 °C)   21 97.9 °F (36.6 °C) (Oral)        BP Readings from Last 3 Encounters:   22 (!) 186/78   22 129/66   21 136/77        Pulse Readings from Last 3 Encounters:   22 75   22 75   21 69        Vitals:    22 1121   BP: (!) 186/78   Pulse: 75   Resp: 16   Temp: 97.5 °F (36.4 °C)   TempSrc: Oral   SpO2: 97%   Weight: 123 lb (55.8 kg)   Height: 5' 4\" (1.626 m)   PainSc:   0 - No pain          Learning Assessment:   :       Learning Assessment 2014   PRIMARY LEARNER Patient   HIGHEST LEVEL OF EDUCATION - PRIMARY LEARNER  DID NOT GRADUATE HIGH SCHOOL   BARRIERS PRIMARY LEARNER NONE   CO-LEARNER CAREGIVER No   PRIMARY LANGUAGE ENGLISH   LEARNER PREFERENCE PRIMARY DEMONSTRATION   ANSWERED BY patient   RELATIONSHIP SELF        Depression Screening:   :       3 most recent PHQ Screens 2022   Little interest or pleasure in doing things Not at all   Feeling down, depressed, irritable, or hopeless Not at all   Total Score PHQ 2 0 Fall Risk Assessment:   :       Fall Risk Assessment, last 12 mths 9/13/2021   Able to walk? Yes   Fall in past 12 months? 0   Do you feel unsteady? 0   Are you worried about falling 0   Number of falls in past 12 months -   Fall with injury? -        Abuse Screening:   :       Abuse Screening Questionnaire 1/4/2022 8/28/2020 8/21/2019 8/10/2018 3/6/2017 3/16/2016 6/13/2014   Do you ever feel afraid of your partner? N N N N N N N   Are you in a relationship with someone who physically or mentally threatens you? N N N N N N N   Is it safe for you to go home?  Y Y Y Y Y Y Y        ADL Screening:   :       ADL Assessment 1/13/2022   Feeding yourself No Help Needed   Getting from bed to chair No Help Needed   Getting dressed No Help Needed   Bathing or showering No Help Needed   Walk across the room (includes cane/walker) No Help Needed   Using the telphone No Help Needed   Taking your medications No Help Needed   Preparing meals No Help Needed   Managing money (expenses/bills) No Help Needed   Moderately strenuous housework (laundry) No Help Needed   Shopping for personal items (toiletries/medicines) No Help Needed   Shopping for groceries No Help Needed   Driving No Help Needed   Climbing a flight of stairs No Help Needed   Getting to places beyond walking distances No Help Needed

## 2022-03-14 NOTE — PROGRESS NOTES
HPI  Ms. Sugar Lopez is a 79y.o. year old female, she is seen today for follow up HTN, GERD. HTN- will have some days of high blood pressure, generally is okay    Is taking losartan 50mg bid, BP has been higher since she had covid infection. Has started walking some, but only 15 minutes at a time. Plans to gradually build endurance and stamina. No chest pain or sob. When BP higher feels her heart beats faster. Has echo and stress test set up with Dr. Piyush Ramirez. Chief Complaint   Patient presents with    Blood Pressure Check     Room 2B //     GERD        Prior to Admission medications    Medication Sig Start Date End Date Taking? Authorizing Provider   losartan (COZAAR) 50 mg tablet Take 1 Tablet by mouth two (2) times a day. 3/14/22  Yes Lilian Springer MD   carvediloL (COREG) 6.25 mg tablet Take 1 Tablet by mouth two (2) times daily (with meals). 3/14/22  Yes Lilian Springer MD   ascorbic acid, vitamin C, (VITAMIN C) 500 mg tablet Take 1 Tablet by mouth two (2) times a day. 1/7/22  Yes Libia Cobb MD   ezetimibe (ZETIA) 10 mg tablet TAKE 1 TABLET EVERY DAY 8/9/21  Yes Viktor Glasgow MD   clopidogreL (PLAVIX) 75 mg tab TAKE 1 TABLET EVERY DAY 6/4/21  Yes Viktor Glasgow MD   rosuvastatin (CRESTOR) 20 mg tablet TAKE 1 TABLET EVERY NIGHT 6/4/21  Yes Lilian Springer MD   amLODIPine (NORVASC) 5 mg tablet Take 1 Tablet by mouth daily. 6/3/21  Yes Viktor Glasgow MD   famotidine (PEPCID) 20 mg tablet TAKE 1 TABLET TWICE DAILY 4/22/21  Yes Lilian Springer MD   cyanocobalamin 1,000 mcg tablet Take 1,000 mcg by mouth daily. Yes Provider, Historical   nicotinic acid (NIACIN) 500 mg tablet Take 500 mg by mouth Daily (before breakfast). Yes Provider, Historical   cholecalciferol, vitamin D3, (VITAMIN D3) 2,000 unit tab Take 1 Tab by mouth daily. Yes Provider, Historical   aspirin 81 mg tablet Take 81 mg by mouth daily.    Yes Provider, Historical   dexAMETHasone (DECADRON) 4 mg tablet 1 tab PO daily  Patient not taking: Reported on 3/14/2022 1/11/22 3/14/22  Janiya Griffin MD   zinc sulfate (ZINCATE) 50 mg zinc (220 mg) capsule Take 1 Capsule by mouth daily. Patient not taking: Reported on 3/14/2022 1/8/22 3/14/22  Josefina San MD   losartan (COZAAR) 50 mg tablet Take 1 Tablet by mouth daily. 6/3/21 3/14/22  Benedict Hendrickson MD   carvediloL (COREG) 3.125 mg tablet TAKE 1 TABLET TWICE DAILY 5/25/21 3/14/22  Benedict Hendrickson MD         Allergies   Allergen Reactions    Lisinopril Other (comments)     High K         REVIEW OF SYSTEMS:  Per HPI  PHYSICAL EXAM:  Visit Vitals  BP (!) 162/54   Pulse 75   Temp 97.5 °F (36.4 °C) (Oral)   Resp 16   Ht 5' 4\" (1.626 m)   Wt 123 lb (55.8 kg)   SpO2 97%   BMI 21.11 kg/m²     Constitutional: Appears well-developed and well-nourished. No distress. HENT:   Head: Normocephalic and atraumatic. Eyes: No scleral icterus. Neck: no lad, no tm, supple   Cardiovascular: Normal S1/S2, regular rhythm. No murmurs, rubs, or gallops. Pulmonary/Chest: Effort normal and breath sounds normal. No respiratory distress. No wheezes, rhonchi, or rales. Ext: No edema. Neurological: Alert. Psychiatric: Normal mood and affect. Behavior is normal.     Lab Results   Component Value Date/Time    Sodium 142 01/06/2022 05:20 AM    Potassium 3.4 (L) 01/06/2022 05:20 AM    Chloride 111 (H) 01/06/2022 05:20 AM    CO2 25 01/06/2022 05:20 AM    Anion gap 6 01/06/2022 05:20 AM    Glucose 130 (H) 01/06/2022 05:20 AM    BUN 27 (H) 01/06/2022 05:20 AM    Creatinine 0.81 01/06/2022 05:20 AM    BUN/Creatinine ratio 33 (H) 01/06/2022 05:20 AM    GFR est AA >60 01/06/2022 05:20 AM    GFR est non-AA >60 01/06/2022 05:20 AM    Calcium 8.5 01/06/2022 05:20 AM    Bilirubin, total 0.5 01/06/2022 05:20 AM    Alk.  phosphatase 86 01/06/2022 05:20 AM    Protein, total 5.8 (L) 01/06/2022 05:20 AM    Albumin 2.2 (L) 01/06/2022 05:20 AM    Globulin 3.6 01/06/2022 05:20 AM    A-G Ratio 0.6 (L) 01/06/2022 05:20 AM    ALT (SGPT) 27 01/06/2022 05:20 AM     Lab Results   Component Value Date/Time    Hemoglobin A1c 6.3 (H) 10/11/2021 08:22 AM    Hemoglobin A1c 5.9 (H) 07/31/2020 09:37 AM    Hemoglobin A1c 5.9 (H) 08/21/2019 10:28 AM      Lab Results   Component Value Date/Time    Cholesterol, total 185 10/11/2021 08:22 AM    HDL Cholesterol 72 10/11/2021 08:22 AM    LDL, calculated 102 (H) 10/11/2021 08:22 AM    LDL, calculated 91 07/31/2020 09:37 AM    VLDL, calculated 11 10/11/2021 08:22 AM    VLDL, calculated 13 07/31/2020 09:37 AM    Triglyceride 60 10/11/2021 08:22 AM    CHOL/HDL Ratio 3.1 09/28/2011 05:00 AM          ASSESSMENT/PLAN  Diagnoses and all orders for this visit:    1. Essential hypertension  -     losartan (COZAAR) 50 mg tablet; Take 1 Tablet by mouth two (2) times a day. -     carvediloL (COREG) 6.25 mg tablet; Take 1 Tablet by mouth two (2) times daily (with meals). 2. Gastroesophageal reflux disease, unspecified whether esophagitis present      BP not to goal - never low at home - new dose coreg is above, continue losartan and amlodipine    There are no preventive care reminders to display for this patient. Follow-up and Dispositions    · Return in about 6 weeks (around 4/25/2022) for bp. Reviewed plan of care. Patient has provided input and agrees with goals. The nurse provided the patient and/or family with advanced directive information if needed and encouraged the patient to provide a copy to the office when available.

## 2022-03-14 NOTE — PATIENT INSTRUCTIONS
Continue losartan 50mg twice a day. New dose of carvedilol is 6.25mg twice a day. If you feel weak on higher dose of carvedilol then go back to 3.125mg dose twice a day.

## 2022-03-15 ENCOUNTER — TELEPHONE (OUTPATIENT)
Dept: INTERNAL MEDICINE CLINIC | Age: 82
End: 2022-03-15

## 2022-03-15 NOTE — TELEPHONE ENCOUNTER
Please give me Tad Whiteside a call she once to know can she double her medication until her new medication comes in .  Please give her a call

## 2022-03-16 NOTE — TELEPHONE ENCOUNTER
I called the patient and verified them by name and date of birth. Currently has 3.25mg tablets of Carvedilol and was upped to 6.25mg twice daily. She wanted to know if she can contiune taking the 3.25mg tablets 2 in the morning and 2 at night until she gets new prescription from mail order.

## 2022-03-18 PROBLEM — E78.5 HYPERLIPIDEMIA: Status: ACTIVE | Noted: 2017-10-18

## 2022-03-19 PROBLEM — I25.10 CORONARY ARTERY DISEASE INVOLVING NATIVE CORONARY ARTERY OF NATIVE HEART WITHOUT ANGINA PECTORIS: Status: ACTIVE | Noted: 2018-04-18

## 2022-03-19 PROBLEM — R00.2 HEART PALPITATIONS: Status: ACTIVE | Noted: 2017-10-18

## 2022-03-20 PROBLEM — J12.82 PNEUMONIA DUE TO COVID-19 VIRUS: Status: ACTIVE | Noted: 2022-01-04

## 2022-03-20 PROBLEM — U07.1 PNEUMONIA DUE TO COVID-19 VIRUS: Status: ACTIVE | Noted: 2022-01-04

## 2022-05-12 ENCOUNTER — OFFICE VISIT (OUTPATIENT)
Dept: INTERNAL MEDICINE CLINIC | Age: 82
End: 2022-05-12
Payer: MEDICARE

## 2022-05-12 VITALS
SYSTOLIC BLOOD PRESSURE: 158 MMHG | HEART RATE: 74 BPM | TEMPERATURE: 98 F | DIASTOLIC BLOOD PRESSURE: 80 MMHG | RESPIRATION RATE: 14 BRPM | BODY MASS INDEX: 21.03 KG/M2 | WEIGHT: 123.2 LBS | OXYGEN SATURATION: 97 % | HEIGHT: 64 IN

## 2022-05-12 DIAGNOSIS — R53.82 CHRONIC FATIGUE: ICD-10-CM

## 2022-05-12 DIAGNOSIS — I10 ESSENTIAL HYPERTENSION: Primary | ICD-10-CM

## 2022-05-12 DIAGNOSIS — L65.9 HAIR LOSS: ICD-10-CM

## 2022-05-12 DIAGNOSIS — I10 PRIMARY HYPERTENSION: ICD-10-CM

## 2022-05-12 DIAGNOSIS — E78.2 MIXED HYPERLIPIDEMIA: ICD-10-CM

## 2022-05-12 PROCEDURE — 1101F PT FALLS ASSESS-DOCD LE1/YR: CPT | Performed by: INTERNAL MEDICINE

## 2022-05-12 PROCEDURE — G8427 DOCREV CUR MEDS BY ELIG CLIN: HCPCS | Performed by: INTERNAL MEDICINE

## 2022-05-12 PROCEDURE — G8432 DEP SCR NOT DOC, RNG: HCPCS | Performed by: INTERNAL MEDICINE

## 2022-05-12 PROCEDURE — G8420 CALC BMI NORM PARAMETERS: HCPCS | Performed by: INTERNAL MEDICINE

## 2022-05-12 PROCEDURE — G8400 PT W/DXA NO RESULTS DOC: HCPCS | Performed by: INTERNAL MEDICINE

## 2022-05-12 PROCEDURE — G8536 NO DOC ELDER MAL SCRN: HCPCS | Performed by: INTERNAL MEDICINE

## 2022-05-12 PROCEDURE — G8754 DIAS BP LESS 90: HCPCS | Performed by: INTERNAL MEDICINE

## 2022-05-12 PROCEDURE — 99214 OFFICE O/P EST MOD 30 MIN: CPT | Performed by: INTERNAL MEDICINE

## 2022-05-12 PROCEDURE — 1090F PRES/ABSN URINE INCON ASSESS: CPT | Performed by: INTERNAL MEDICINE

## 2022-05-12 PROCEDURE — G8753 SYS BP > OR = 140: HCPCS | Performed by: INTERNAL MEDICINE

## 2022-05-12 RX ORDER — CARVEDILOL 12.5 MG/1
12.5 TABLET ORAL 2 TIMES DAILY WITH MEALS
Qty: 180 TABLET | Refills: 2 | Status: SHIPPED | OUTPATIENT
Start: 2022-05-12

## 2022-05-12 RX ORDER — LANOLIN ALCOHOL/MO/W.PET/CERES
1000 CREAM (GRAM) TOPICAL DAILY
Qty: 90 TABLET | Refills: 3
Start: 2022-05-12 | End: 2022-07-06

## 2022-05-12 NOTE — PROGRESS NOTES
HPI  Ms. Gloria Wray is a 79y.o. year old female, she is seen today for follow up HTN.   161/87 this am with HR 78. After sitting for a while is 136/71. Brings list:   Tired, cold a lot - but better than she was after having covid - getting stronger  Just started walking again for exercise, notes she has to be more careful b/c balance isn't as good as when she was younger  Sometimes joint pains  Has noticed more hair loss since covid. Mild ankle edema - better than in the past.   Wonders if she has wax in her ears. No pain. No chest pain, sob, dizziness, weakness. Will get stress test and echo next week for Dr. Bill Murdock - cardiology. Chief Complaint   Patient presents with    Blood Pressure Check     Room 2B //         Prior to Admission medications    Medication Sig Start Date End Date Taking? Authorizing Provider   cyanocobalamin 1,000 mcg tablet Take 1 Tablet by mouth daily. 5/12/22  Yes Criss Rae MD   carvediloL (COREG) 12.5 mg tablet Take 1 Tablet by mouth two (2) times daily (with meals). 5/12/22  Yes Criss Rae MD   losartan (COZAAR) 50 mg tablet Take 1 Tablet by mouth two (2) times a day. 3/14/22  Yes Criss Rae MD   ezetimibe (ZETIA) 10 mg tablet TAKE 1 TABLET EVERY DAY 8/9/21  Yes Alfonso Kay MD   clopidogreL (PLAVIX) 75 mg tab TAKE 1 TABLET EVERY DAY 6/4/21  Yes Alfonso Kay MD   amLODIPine (NORVASC) 5 mg tablet Take 1 Tablet by mouth daily. 6/3/21  Yes Alfonso Kay MD   nicotinic acid (NIACIN) 500 mg tablet Take 500 mg by mouth Daily (before breakfast). Yes Provider, Historical   cholecalciferol, vitamin D3, (VITAMIN D3) 2,000 unit tab Take 1 Tab by mouth daily. Yes Provider, Historical   aspirin 81 mg tablet Take 81 mg by mouth daily.    Yes Provider, Historical   rosuvastatin (CRESTOR) 20 mg tablet TAKE 1 TABLET EVERY NIGHT 5/16/22   Criss Rae MD   famotidine (PEPCID) 20 mg tablet TAKE 1 TABLET TWICE DAILY 5/16/22 Caroline Yanez MD   ascorbic acid, vitamin C, (VITAMIN C) 500 mg tablet Take 1 Tablet by mouth two (2) times a day. Patient not taking: Reported on 5/12/2022 1/7/22   Justa Bullock MD         Allergies   Allergen Reactions    Lisinopril Other (comments)     High K         REVIEW OF SYSTEMS:  Per HPI    PHYSICAL EXAM:  Visit Vitals  BP (!) 158/80   Pulse 74   Temp 98 °F (36.7 °C) (Oral)   Resp 14   Ht 5' 4\" (1.626 m)   Wt 123 lb 3.2 oz (55.9 kg)   SpO2 97%   BMI 21.15 kg/m²     Constitutional: Appears well-developed and well-nourished. No distress. HENT:   Head: Normocephalic and atraumatic. Eyes: No scleral icterus. Neck: no lad, no tm, supple   Cardiovascular: Normal S1/S2, regular rhythm. No murmurs, rubs, or gallops. Pulmonary/Chest: Effort normal and breath sounds normal. No respiratory distress. No wheezes, rhonchi, or rales. Ext: No edema. Neurological: Alert. Psychiatric: Normal mood and affect. Behavior is normal.     Lab Results   Component Value Date/Time    Sodium 143 05/16/2022 08:09 AM    Potassium 4.8 05/16/2022 08:09 AM    Chloride 106 05/16/2022 08:09 AM    CO2 20 05/16/2022 08:09 AM    Anion gap 6 01/06/2022 05:20 AM    Glucose 127 (H) 05/16/2022 08:09 AM    BUN 22 05/16/2022 08:09 AM    Creatinine 0.97 05/16/2022 08:09 AM    BUN/Creatinine ratio 23 05/16/2022 08:09 AM    GFR est AA >60 01/06/2022 05:20 AM    GFR est non-AA >60 01/06/2022 05:20 AM    Calcium 9.7 05/16/2022 08:09 AM    Bilirubin, total 0.5 05/16/2022 08:09 AM    Alk.  phosphatase 104 05/16/2022 08:09 AM    Protein, total 6.5 05/16/2022 08:09 AM    Albumin 4.4 05/16/2022 08:09 AM    Globulin 3.6 01/06/2022 05:20 AM    A-G Ratio 2.1 05/16/2022 08:09 AM    ALT (SGPT) 20 05/16/2022 08:09 AM     Lab Results   Component Value Date/Time    Hemoglobin A1c 6.3 (H) 10/11/2021 08:22 AM    Hemoglobin A1c 5.9 (H) 07/31/2020 09:37 AM    Hemoglobin A1c 5.9 (H) 08/21/2019 10:28 AM      Lab Results   Component Value Date/Time Cholesterol, total 196 05/16/2022 08:09 AM    HDL Cholesterol 82 05/16/2022 08:09 AM    LDL, calculated 102 (H) 05/16/2022 08:09 AM    LDL, calculated 91 07/31/2020 09:37 AM    VLDL, calculated 12 05/16/2022 08:09 AM    VLDL, calculated 13 07/31/2020 09:37 AM    Triglyceride 64 05/16/2022 08:09 AM    CHOL/HDL Ratio 3.1 09/28/2011 05:00 AM          ASSESSMENT/PLAN  Diagnoses and all orders for this visit:    1. Essential hypertension  -     carvediloL (COREG) 12.5 mg tablet; Take 1 Tablet by mouth two (2) times daily (with meals). -     METABOLIC PANEL, COMPREHENSIVE; Future    2. Primary hypertension  -     CBC WITH AUTOMATED DIFF; Future    3. Mixed hyperlipidemia  -     LIPID PANEL; Future    4. Hair loss  -     TSH 3RD GENERATION; Future  -     T4, FREE; Future  -     FERRITIN; Future  -     IRON PROFILE; Future    5. Chronic fatigue    Other orders  -     cyanocobalamin 1,000 mcg tablet; Take 1 Tablet by mouth daily.  -     CBC WITH AUTOMATED DIFF  -     METABOLIC PANEL, COMPREHENSIVE  -     LIPID PANEL  -     IRON PROFILE  -     T4, FREE  -     TSH 3RD GENERATION  -     FERRITIN      Blood pressure is not to goal, increase carvedilol. She will follow-up with cardiology. For hair loss check iron levels thyroid levels and CBC. Suspect this is related to telogen effluvium related to recent COVID illness. For chronic fatigue check CBC. There are no preventive care reminders to display for this patient. Follow-up and Dispositions    · Return in about 6 weeks (around 6/23/2022) for bp - 15 okay. Reviewed plan of care. Patient has provided input and agrees with goals. The nurse provided the patient and/or family with advanced directive information if needed and encouraged the patient to provide a copy to the office when available.

## 2022-05-12 NOTE — PROGRESS NOTES
Laura Wallis  Identified pt with two pt identifiers(name and ). Chief Complaint   Patient presents with    Blood Pressure Check     Room 2B //        Reviewed record In preparation for visit and have obtained necessary documentation. 1. Have you been to the ER, urgent care clinic or hospitalized since your last visit? No     2. Have you seen or consulted any other health care providers outside of the [address] [place] [address] since your last visit? Include any pap smears or colon screening. No    Patient does not have an advance directive. Vitals reviewed with provider. Health Maintenance reviewed: There are no preventive care reminders to display for this patient.        Wt Readings from Last 3 Encounters:   22 123 lb 3.2 oz (55.9 kg)   22 123 lb (55.8 kg)   22 100 lb 15.5 oz (45.8 kg)        Temp Readings from Last 3 Encounters:   22 98 °F (36.7 °C) (Oral)   22 97.5 °F (36.4 °C) (Oral)   22 98.2 °F (36.8 °C)        BP Readings from Last 3 Encounters:   22 (!) 200/63   22 (!) 162/54   22 129/66        Pulse Readings from Last 3 Encounters:   22 74   22 75   22 75        Vitals:    22 1412   BP: (!) 200/63   Pulse: 74   Resp: 14   Temp: 98 °F (36.7 °C)   TempSrc: Oral   SpO2: 97%   Weight: 123 lb 3.2 oz (55.9 kg)   Height: 5' 4\" (1.626 m)   PainSc:   0 - No pain          Learning Assessment:   :       Learning Assessment 2014   PRIMARY LEARNER Patient   HIGHEST LEVEL OF EDUCATION - PRIMARY LEARNER  DID NOT GRADUATE HIGH SCHOOL   BARRIERS PRIMARY LEARNER NONE   CO-LEARNER CAREGIVER No   PRIMARY LANGUAGE ENGLISH   LEARNER PREFERENCE PRIMARY DEMONSTRATION   ANSWERED BY patient   RELATIONSHIP SELF        Depression Screening:   :       3 most recent PHQ Screens 2022   Little interest or pleasure in doing things Not at all   Feeling down, depressed, irritable, or hopeless Not at all   Total Score PHQ 2 0 Fall Risk Assessment:   :       Fall Risk Assessment, last 12 mths 5/12/2022   Able to walk? Yes   Fall in past 12 months? 0   Do you feel unsteady? 1   Are you worried about falling 0   Number of falls in past 12 months -   Fall with injury? -        Abuse Screening:   :       Abuse Screening Questionnaire 5/12/2022 1/4/2022 8/28/2020 8/21/2019 8/10/2018 3/6/2017 3/16/2016   Do you ever feel afraid of your partner? N N N N N N N   Are you in a relationship with someone who physically or mentally threatens you? N N N N N N N   Is it safe for you to go home?  Y Y Y Y Y Y Y        ADL Screening:   :       ADL Assessment 5/12/2022   Feeding yourself No Help Needed   Getting from bed to chair No Help Needed   Getting dressed No Help Needed   Bathing or showering No Help Needed   Walk across the room (includes cane/walker) No Help Needed   Using the telphone No Help Needed   Taking your medications No Help Needed   Preparing meals No Help Needed   Managing money (expenses/bills) No Help Needed   Moderately strenuous housework (laundry) No Help Needed   Shopping for personal items (toiletries/medicines) No Help Needed   Shopping for groceries No Help Needed   Driving No Help Needed   Climbing a flight of stairs No Help Needed   Getting to places beyond walking distances No Help Needed

## 2022-05-16 DIAGNOSIS — K21.9 GASTROESOPHAGEAL REFLUX DISEASE WITHOUT ESOPHAGITIS: ICD-10-CM

## 2022-05-16 DIAGNOSIS — E78.2 MIXED HYPERLIPIDEMIA: ICD-10-CM

## 2022-05-16 RX ORDER — FAMOTIDINE 20 MG/1
TABLET, FILM COATED ORAL
Qty: 180 TABLET | Refills: 3 | Status: SHIPPED | OUTPATIENT
Start: 2022-05-16

## 2022-05-16 RX ORDER — ROSUVASTATIN CALCIUM 20 MG/1
TABLET, COATED ORAL
Qty: 90 TABLET | Refills: 3 | Status: SHIPPED | OUTPATIENT
Start: 2022-05-16

## 2022-05-17 LAB
ALBUMIN SERPL-MCNC: 4.4 G/DL (ref 3.6–4.6)
ALBUMIN/GLOB SERPL: 2.1 {RATIO} (ref 1.2–2.2)
ALP SERPL-CCNC: 104 IU/L (ref 44–121)
ALT SERPL-CCNC: 20 IU/L (ref 0–32)
AST SERPL-CCNC: 23 IU/L (ref 0–40)
BASOPHILS # BLD AUTO: 0 X10E3/UL (ref 0–0.2)
BASOPHILS NFR BLD AUTO: 1 %
BILIRUB SERPL-MCNC: 0.5 MG/DL (ref 0–1.2)
BUN SERPL-MCNC: 22 MG/DL (ref 8–27)
BUN/CREAT SERPL: 23 (ref 12–28)
CALCIUM SERPL-MCNC: 9.7 MG/DL (ref 8.7–10.3)
CHLORIDE SERPL-SCNC: 106 MMOL/L (ref 96–106)
CHOLEST SERPL-MCNC: 196 MG/DL (ref 100–199)
CO2 SERPL-SCNC: 20 MMOL/L (ref 20–29)
CREAT SERPL-MCNC: 0.97 MG/DL (ref 0.57–1)
EGFR: 58 ML/MIN/1.73
EOSINOPHIL # BLD AUTO: 0.4 X10E3/UL (ref 0–0.4)
EOSINOPHIL NFR BLD AUTO: 7 %
ERYTHROCYTE [DISTWIDTH] IN BLOOD BY AUTOMATED COUNT: 11.8 % (ref 11.7–15.4)
FERRITIN SERPL-MCNC: 92 NG/ML (ref 15–150)
GLOBULIN SER CALC-MCNC: 2.1 G/DL (ref 1.5–4.5)
GLUCOSE SERPL-MCNC: 127 MG/DL (ref 65–99)
HCT VFR BLD AUTO: 42.5 % (ref 34–46.6)
HDLC SERPL-MCNC: 82 MG/DL
HGB BLD-MCNC: 14 G/DL (ref 11.1–15.9)
IMM GRANULOCYTES # BLD AUTO: 0 X10E3/UL (ref 0–0.1)
IMM GRANULOCYTES NFR BLD AUTO: 0 %
IRON SATN MFR SERPL: 20 % (ref 15–55)
IRON SERPL-MCNC: 68 UG/DL (ref 27–139)
LDLC SERPL CALC-MCNC: 102 MG/DL (ref 0–99)
LYMPHOCYTES # BLD AUTO: 1.3 X10E3/UL (ref 0.7–3.1)
LYMPHOCYTES NFR BLD AUTO: 25 %
MCH RBC QN AUTO: 29.5 PG (ref 26.6–33)
MCHC RBC AUTO-ENTMCNC: 32.9 G/DL (ref 31.5–35.7)
MCV RBC AUTO: 90 FL (ref 79–97)
MONOCYTES # BLD AUTO: 0.6 X10E3/UL (ref 0.1–0.9)
MONOCYTES NFR BLD AUTO: 11 %
NEUTROPHILS # BLD AUTO: 2.9 X10E3/UL (ref 1.4–7)
NEUTROPHILS NFR BLD AUTO: 56 %
PLATELET # BLD AUTO: 234 X10E3/UL (ref 150–450)
POTASSIUM SERPL-SCNC: 4.8 MMOL/L (ref 3.5–5.2)
PROT SERPL-MCNC: 6.5 G/DL (ref 6–8.5)
RBC # BLD AUTO: 4.75 X10E6/UL (ref 3.77–5.28)
SODIUM SERPL-SCNC: 143 MMOL/L (ref 134–144)
T4 FREE SERPL-MCNC: 1.13 NG/DL (ref 0.82–1.77)
TIBC SERPL-MCNC: 334 UG/DL (ref 250–450)
TRIGL SERPL-MCNC: 64 MG/DL (ref 0–149)
TSH SERPL DL<=0.005 MIU/L-ACNC: 2.92 UIU/ML (ref 0.45–4.5)
UIBC SERPL-MCNC: 266 UG/DL (ref 118–369)
VLDLC SERPL CALC-MCNC: 12 MG/DL (ref 5–40)
WBC # BLD AUTO: 5.2 X10E3/UL (ref 3.4–10.8)

## 2022-05-20 ENCOUNTER — DOCUMENTATION ONLY (OUTPATIENT)
Dept: CARDIOLOGY | Age: 82
End: 2022-05-20

## 2022-05-20 NOTE — PROGRESS NOTES
PCP: Dr. Ranjan Arredondo    HPI: Blaise Sosa is a 79 y.o. female who was seen on 6/3/2021. Patient is being seen today for HTN, GERD, XOL. She is slowly feeling better. She has started walking for exercise again, just short distances for now. Her pcp increased her Carvedilol to the max dose of 12.5. She has no shortness of breath or palpitations to note, and no chest pain, or swelling to report. She has some occasional wooziness that she associates with a possible inner ear imbalance. Her toes on her right foot continue to remain numb. Overall all she feels that she is slowly continuing to improve, and is feeling okay at this time. Her balance is off and she is not exercising as much doesnt have the stamina that she used to have etc.     Eyes checked last week and doing ok. Losing her hair. Coreg has been increased 2x since she was here for BP going up, today hr 65 and bp is great, 122/64. Patient visited the hospital 01/03/22 for positive COVID test. Patient went to [facility] for four days. Patient states she is feeling a lot better since getting over [employer]. Limited chest pain. No shortness. Palpitations noted. Dizziness noted while patient also had a very high blood pressure reading. This happened during her being sick with COVID. No nausea or vomiting noted. No edema reported or shown. Patient reports numb toes in her right foot. Didn't notice until after getting sick with COVID. Patient also feels her legs are often weaker than normal and her balance seems off. No edema. I encouraged her to get back to exercising, etc, she is having trouble with motivation right now. Patients  passed away Dec 2021. This was when blood pressure was high and dizziness was happening. Dr. Emanuel Corona increased losartan from once to twice a day. No chest pain.  Dizziness has improved since blood pressure medication was adjusted but still having spikes of blood pressure intermittently. Patient states she has mild shortness of breath on exertion this is not worse or progressive. She reports intermittent palpitations. She reports dizziness on position change. Hgb13.9 crt nl, k 4.7 LFT good,   HDL 72 A1C 6.3         Assessment /Plan         1. HTN -borderline high  2. CAD- stable, s/p TRANG to RCA and mid LAD 3/11, cath 8/12 with proximal RCA stent, no ischemia on stress test in 2018  -lifelong plavix and aspirin (her decision after discussion of risks and benefits)  -continue coreg and statin  3. Palpitations- better on coreg, still had v-bigemeny on last holter  4. Hyperlipidemia- , cont crestor 20mg daily and zetia  -declined PCSK9 inhibition  5. Anxiety- better since she stopped checking her blood pressure at home  6. Reflux- on pepcid   7. Vit D deficiency 2000u/day  8. Impaired glucose tolerance-cont to exercise  9. CKD Stage 3-stable, crt 1-1.1, seen by Dr. Rikki Marino. -hyperkalemia in the past improved with stopping spironolactone    10. Mild to moderate mitral regurgitation   11. PAD - bilateral carotid bruits, mild Bety and moderate LICA  12. Possible SS- was above watch for now and will get loop if sx progress.    13. Edema-go ahead and reduce amlodipine and increase losartan       Echo 8/20 EF 55% mild MR PAP 33    Carotid 2/18 Lica 07-03%, Bety <25%  Stress nuc 4/18 wnl  Echo 11/15 EF 60%, mild TR, PAP 25, mild-mod MR  Carotid US: Mod LICA plaque <74%, no evidence of Subclavian Steal.  Stress nuc: 1/14 normal, EF 69%  Echo 9/12 EF 60, mod MR, mild TR  Cath 2012 RCA stent     Fhx no early cad  Soc no tob, no etoh

## 2022-07-06 ENCOUNTER — OFFICE VISIT (OUTPATIENT)
Dept: INTERNAL MEDICINE CLINIC | Age: 82
End: 2022-07-06
Payer: MEDICARE

## 2022-07-06 VITALS
DIASTOLIC BLOOD PRESSURE: 60 MMHG | WEIGHT: 121.8 LBS | TEMPERATURE: 98.3 F | HEART RATE: 57 BPM | OXYGEN SATURATION: 98 % | BODY MASS INDEX: 20.79 KG/M2 | SYSTOLIC BLOOD PRESSURE: 148 MMHG | RESPIRATION RATE: 16 BRPM | HEIGHT: 64 IN

## 2022-07-06 DIAGNOSIS — L20.84 INTRINSIC ATOPIC DERMATITIS: ICD-10-CM

## 2022-07-06 DIAGNOSIS — I10 PRIMARY HYPERTENSION: Primary | ICD-10-CM

## 2022-07-06 DIAGNOSIS — L65.0 TELOGEN EFFLUVIUM: ICD-10-CM

## 2022-07-06 DIAGNOSIS — R73.01 IFG (IMPAIRED FASTING GLUCOSE): ICD-10-CM

## 2022-07-06 DIAGNOSIS — R53.82 CHRONIC FATIGUE: ICD-10-CM

## 2022-07-06 LAB — HBA1C MFR BLD HPLC: 6.1 %

## 2022-07-06 PROCEDURE — 1123F ACP DISCUSS/DSCN MKR DOCD: CPT | Performed by: INTERNAL MEDICINE

## 2022-07-06 PROCEDURE — 1101F PT FALLS ASSESS-DOCD LE1/YR: CPT | Performed by: INTERNAL MEDICINE

## 2022-07-06 PROCEDURE — 83036 HEMOGLOBIN GLYCOSYLATED A1C: CPT | Performed by: INTERNAL MEDICINE

## 2022-07-06 PROCEDURE — G8420 CALC BMI NORM PARAMETERS: HCPCS | Performed by: INTERNAL MEDICINE

## 2022-07-06 PROCEDURE — G8427 DOCREV CUR MEDS BY ELIG CLIN: HCPCS | Performed by: INTERNAL MEDICINE

## 2022-07-06 PROCEDURE — 99214 OFFICE O/P EST MOD 30 MIN: CPT | Performed by: INTERNAL MEDICINE

## 2022-07-06 PROCEDURE — G8754 DIAS BP LESS 90: HCPCS | Performed by: INTERNAL MEDICINE

## 2022-07-06 PROCEDURE — G8510 SCR DEP NEG, NO PLAN REQD: HCPCS | Performed by: INTERNAL MEDICINE

## 2022-07-06 PROCEDURE — G8536 NO DOC ELDER MAL SCRN: HCPCS | Performed by: INTERNAL MEDICINE

## 2022-07-06 PROCEDURE — 1090F PRES/ABSN URINE INCON ASSESS: CPT | Performed by: INTERNAL MEDICINE

## 2022-07-06 PROCEDURE — G8400 PT W/DXA NO RESULTS DOC: HCPCS | Performed by: INTERNAL MEDICINE

## 2022-07-06 PROCEDURE — G8753 SYS BP > OR = 140: HCPCS | Performed by: INTERNAL MEDICINE

## 2022-07-06 NOTE — PROGRESS NOTES
Beltran Watters  Identified pt with two pt identifiers(name and ). Chief Complaint   Patient presents with    Blood Pressure Check     Room 2B //        Reviewed record In preparation for visit and have obtained necessary documentation. 1. Have you been to the ER, urgent care clinic or hospitalized since your last visit? No     2. Have you seen or consulted any other health care providers outside of the [address] [place] [address] since your last visit? Include any pap smears or colon screening. No    Patient does not have an advance directive. Vitals reviewed with provider. Health Maintenance reviewed: There are no preventive care reminders to display for this patient. Wt Readings from Last 3 Encounters:   22 121 lb 12.8 oz (55.2 kg)   22 123 lb 3.2 oz (55.9 kg)   22 123 lb (55.8 kg)      Temp Readings from Last 3 Encounters:   22 98 °F (36.7 °C) (Oral)   22 97.5 °F (36.4 °C) (Oral)   22 98.2 °F (36.8 °C)      BP Readings from Last 3 Encounters:   22 (!) 158/80   22 (!) 162/54   22 129/66      Pulse Readings from Last 3 Encounters:   22 74   22 75   22 75      Vitals:    22 1050   Resp: 16   Weight: 121 lb 12.8 oz (55.2 kg)   Height: 5' 4\" (1.626 m)   PainSc:   0 - No pain          Learning Assessment:   :     Learning Assessment 2014   PRIMARY LEARNER Patient   HIGHEST LEVEL OF EDUCATION - PRIMARY LEARNER  DID NOT GRADUATE HIGH SCHOOL   BARRIERS PRIMARY LEARNER NONE   CO-LEARNER CAREGIVER No   PRIMARY LANGUAGE ENGLISH   LEARNER PREFERENCE PRIMARY DEMONSTRATION   ANSWERED BY patient   RELATIONSHIP SELF        Depression Screening:   :     3 most recent PHQ Screens 2022   Little interest or pleasure in doing things Not at all   Feeling down, depressed, irritable, or hopeless Not at all   Total Score PHQ 2 0        Fall Risk Assessment:   :     Fall Risk Assessment, last 12 mths 2022   Able to walk?  Yes Fall in past 12 months? 0   Do you feel unsteady? 1   Are you worried about falling 0   Number of falls in past 12 months -   Fall with injury? -        Abuse Screening:   :     Abuse Screening Questionnaire 5/12/2022 1/4/2022 8/28/2020 8/21/2019 8/10/2018 3/6/2017 3/16/2016   Do you ever feel afraid of your partner? N N N N N N N   Are you in a relationship with someone who physically or mentally threatens you? N N N N N N N   Is it safe for you to go home?  Y Y Y Y Y Y Y        ADL Screening:   :     ADL Assessment 5/12/2022   Feeding yourself No Help Needed   Getting from bed to chair No Help Needed   Getting dressed No Help Needed   Bathing or showering No Help Needed   Walk across the room (includes cane/walker) No Help Needed   Using the telphone No Help Needed   Taking your medications No Help Needed   Preparing meals No Help Needed   Managing money (expenses/bills) No Help Needed   Moderately strenuous housework (laundry) No Help Needed   Shopping for personal items (toiletries/medicines) No Help Needed   Shopping for groceries No Help Needed   Driving No Help Needed   Climbing a flight of stairs No Help Needed   Getting to places beyond walking distances No Help Needed

## 2022-07-06 NOTE — PATIENT INSTRUCTIONS
Hydrocortisone 2.5% cream to itchy areas on elbow     Atopic Dermatitis: Care Instructions  Overview  Atopic dermatitis (also called eczema) is a skin problem that causes intense itching and a red, raised rash. In severe cases, the rash develops clear fluid-filled blisters. The rash is not contagious. You can't catch it from others. People with this condition seem to have very sensitive immune systems that are likely to react to things that cause allergies. The immune system is the body's way of fighting infection. There is no cure for atopic dermatitis. But you may be able to control it with care at home. Follow-up care is a key part of your treatment and safety. Be sure to make and go to all appointments, and call your doctor if you are having problems. It's also a good idea to know your test results and keep a list of the medicines you take. How can you care for yourself at home? · Use moisturizer at least twice a day. · If your doctor prescribes a cream, use it as directed. If your doctor prescribes other medicine, take it exactly as directed. · Wash the affected area with warm (not hot) water only. Soap can make dryness and itching worse. Pat dry. · Apply a moisturizer after bathing. Use a cream such as Cetaphil, Lubriderm, or Moisturel that does not irritate the skin or cause a rash. Apply the cream while your skin is still damp after lightly drying with a towel. · Use cold, wet cloths to reduce itching. · Keep cool, and stay out of the sun. · If itching affects your sleep, ask your doctor if you can take an antihistamine that might reduce itching and make you sleepy, such as diphenhydramine (Benadryl). Be safe with medicines. Read and follow all instructions on the label. · Control scratching. Keep your fingernails trimmed and smooth to prevent damage to the skin when you scratch it. Wearing cotton mittens or gloves can help you stop scratching. · Try to avoid things that trigger your rash.  These may include things like allergens, such as pollen or animal dander. Harsh soaps, scratchy clothes, stress, and some foods are other examples. When should you call for help? Call your doctor now or seek immediate medical care if:    · Your rash gets worse and you have a fever.     · You have new blisters or bruises, or the rash spreads and looks like a sunburn.     · You have signs of infection, such as:  ? Increased pain, swelling, warmth, or redness. ? Red streaks leading from the rash. ? Pus draining from the rash. ? A fever.     · You have crusting or oozing sores.     · You have joint aches or body aches along with your rash. Watch closely for changes in your health, and be sure to contact your doctor if:    · Your rash does not clear up after 2 to 3 weeks of home treatment.     · Itching interferes with your sleep or daily activities. Where can you learn more? Go to http://www.gray.com/  Enter I585 in the search box to learn more about \"Atopic Dermatitis: Care Instructions. \"  Current as of: November 15, 2021               Content Version: 13.2  © 7575-8126 [employer]. Care instructions adapted under license by [employer] (which disclaims liability or warranty for this information). If you have questions about a medical condition or this instruction, always ask your healthcare professional. [address] any warranty or liability for your use of this information.

## 2022-07-06 NOTE — PROGRESS NOTES
HPI  Ms. Sergio Cordova is a 79y.o. year old female, she is seen today for follow up HTN. Plan about 6 weeks ago:      Blood pressure is not to goal, increase carvedilol. She will follow-up with cardiology. For hair loss check iron levels thyroid levels and CBC. Suspect this is related to telogen effluvium related to recent COVID illness. For chronic fatigue check CBC. All labs acceptable. BP at home has been 116-140/65-79 HR 59-74    Still feeling fatigue but able to walk now 6 days per week, gets up early to walk. Energy is overall improved, no chest pain or sob. No dizziness. Feels better since she started walking. Occasional \"woozy\" spells when feels a bit unbalanced - chronic for years. Will wake with hand feeling numb, gets better as day goes on. Itchy spot left elbow for a few weeks, sometimes burning. No rash. Hair loss has resolved. Chief Complaint   Patient presents with    Blood Pressure Check     Room 2B //         Prior to Admission medications    Medication Sig Start Date End Date Taking? Authorizing Provider   rosuvastatin (CRESTOR) 20 mg tablet TAKE 1 TABLET EVERY NIGHT 5/16/22  Yes Flakita Morocho MD   famotidine (PEPCID) 20 mg tablet TAKE 1 TABLET TWICE DAILY 5/16/22  Yes Flakita Morocho MD   carvediloL (COREG) 12.5 mg tablet Take 1 Tablet by mouth two (2) times daily (with meals). 5/12/22  Yes Flakita Morocho MD   losartan (COZAAR) 50 mg tablet Take 1 Tablet by mouth two (2) times a day. 3/14/22  Yes Flakita Morocho MD   ezetimibe (ZETIA) 10 mg tablet TAKE 1 TABLET EVERY DAY 8/9/21  Yes Julia Johnson MD   clopidogreL (PLAVIX) 75 mg tab TAKE 1 TABLET EVERY DAY 6/4/21  Yes Julia Johnson MD   amLODIPine (NORVASC) 5 mg tablet Take 1 Tablet by mouth daily. 6/3/21  Yes Julia Johnson MD   nicotinic acid (NIACIN) 500 mg tablet Take 500 mg by mouth Daily (before breakfast).    Yes Provider, Historical   cholecalciferol, vitamin D3, (VITAMIN D3) 2,000 unit tab Take 1 Tab by mouth daily. Yes Provider, Historical   aspirin 81 mg tablet Take 81 mg by mouth daily. Yes Provider, Historical   cyanocobalamin 1,000 mcg tablet Take 1 Tablet by mouth daily. Patient not taking: Reported on 7/6/2022 5/12/22   Mario Dlegado MD   ascorbic acid, vitamin C, (VITAMIN C) 500 mg tablet Take 1 Tablet by mouth two (2) times a day. Patient not taking: Reported on 5/12/2022 1/7/22   Emma Rose MD         Allergies   Allergen Reactions    Lisinopril Other (comments)     High K         REVIEW OF SYSTEMS:  Per HPI    PHYSICAL EXAM:  Visit Vitals  BP (!) 148/60   Pulse (!) 57   Temp 98.3 °F (36.8 °C) (Oral)   Resp 16   Ht 5' 4\" (1.626 m)   Wt 121 lb 12.8 oz (55.2 kg)   SpO2 98%   BMI 20.91 kg/m²     Constitutional: Appears well-developed and well-nourished. No distress. HENT:   Head: Normocephalic and atraumatic. Eyes: No scleral icterus. Neck: no lad, no tm, supple   Cardiovascular: Normal S1/S2, regular rhythm. No murmurs, rubs, or gallops. Pulmonary/Chest: Effort normal and breath sounds normal. No respiratory distress. No wheezes, rhonchi, or rales. Skin: 2 dry spots left elbow   Ext: No edema. Neurological: Alert. Psychiatric: Normal mood and affect. Behavior is normal.     Lab Results   Component Value Date/Time    Sodium 143 05/16/2022 08:09 AM    Potassium 4.8 05/16/2022 08:09 AM    Chloride 106 05/16/2022 08:09 AM    CO2 20 05/16/2022 08:09 AM    Anion gap 6 01/06/2022 05:20 AM    Glucose 127 (H) 05/16/2022 08:09 AM    BUN 22 05/16/2022 08:09 AM    Creatinine 0.97 05/16/2022 08:09 AM    BUN/Creatinine ratio 23 05/16/2022 08:09 AM    GFR est AA >60 01/06/2022 05:20 AM    GFR est non-AA >60 01/06/2022 05:20 AM    Calcium 9.7 05/16/2022 08:09 AM    Bilirubin, total 0.5 05/16/2022 08:09 AM    Alk.  phosphatase 104 05/16/2022 08:09 AM    Protein, total 6.5 05/16/2022 08:09 AM    Albumin 4.4 05/16/2022 08:09 AM    Globulin 3.6 01/06/2022 05:20 AM    A-G Ratio 2.1 05/16/2022 08:09 AM    ALT (SGPT) 20 05/16/2022 08:09 AM     Lab Results   Component Value Date/Time    Hemoglobin A1c 6.3 (H) 10/11/2021 08:22 AM    Hemoglobin A1c 5.9 (H) 07/31/2020 09:37 AM    Hemoglobin A1c 5.9 (H) 08/21/2019 10:28 AM      Lab Results   Component Value Date/Time    Cholesterol, total 196 05/16/2022 08:09 AM    HDL Cholesterol 82 05/16/2022 08:09 AM    LDL, calculated 102 (H) 05/16/2022 08:09 AM    LDL, calculated 91 07/31/2020 09:37 AM    VLDL, calculated 12 05/16/2022 08:09 AM    VLDL, calculated 13 07/31/2020 09:37 AM    Triglyceride 64 05/16/2022 08:09 AM    CHOL/HDL Ratio 3.1 09/28/2011 05:00 AM          ASSESSMENT/PLAN  Diagnoses and all orders for this visit:    1. Primary hypertension    2. Chronic fatigue    3. IFG (impaired fasting glucose)  -     AMB POC HEMOGLOBIN A1C    4. Intrinsic atopic dermatitis    5. Telogen effluvium    Blood pressure is significantly improved, continue current medications and continue to monitor at home. Glucose was high, will get A1c today. Chronic fatigue is improving with exercise and likely related to COVID infection over the winter. For #4 we will use hydrocortisone and a moisturizer such as Cetaphil or Eucerin. #5 has resolved. Lipids acceptable on current medications. A1C improved at 6.1    There are no preventive care reminders to display for this patient. Follow-up and Dispositions    · Return in about 6 months (around 1/6/2023), or if symptoms worsen or fail to improve, for bp, gerd. Reviewed plan of care. Patient has provided input and agrees with goals. The nurse provided the patient and/or family with advanced directive information if needed and encouraged the patient to provide a copy to the office when available.

## 2022-10-14 ENCOUNTER — DOCUMENTATION ONLY (OUTPATIENT)
Dept: CARDIOLOGY | Age: 82
End: 2022-10-14

## 2022-10-14 NOTE — PROGRESS NOTES
PCP: Dr. Julio Vázquez    HPI: Francisco Garcia is a 79 y.o. female seen today  for HTN, GERD, XOL CAD. She feels tired most of the time and does not have much stamina. She is still having some wooziness/dizziness at times. No chest pain, sob, or palpitations. No edema that she notices. She has been feeling tired ever since she had COVID. And she got it a second time. So she is just not recovering all the way. She is just not feeling well. She is just not walking or exercising, Previously walking at 6:30 am daily and that was going well. She wonders if she is just getting arthritis and she wants to start back to walking but hasn't done it. Her balance is off and she is not exercising as much doesn't have the stamina that she used to have etc.     Eyes checked last week and doing ok. Losing her hair. Coreg has been increased 2x since she was here for BP going up,    BP is better but I do wonder if the coreg is increasing her fatigue.     -today will cut coreg to 6.25mg daily and stop losartan, replace with valsartanHCT        She has been having a hard time since her   as well. I encouraged her to get back to exercising, etc, she is having trouble with motivation right now. Patients  passed away Dec 2021. This was when blood pressure was high and dizziness was happening. Dr. Harley Guthrie increased losartan from once to twice a day. No chest pain. Dizziness has improved since blood pressure medication was adjusted but still having spikes of blood pressure intermittently. Patient states she has mild shortness of breath on exertion this is not worse or progressive. She reports intermittent palpitations. She reports dizziness on position change. Hgb13.9 crt nl, k 4.7 LFT good,   HDL 72 A1C 6.3         Assessment /Plan         1. HTN -borderline high  2.  CAD- stable, s/p TRANG to RCA and mid LAD 3/11, cath  with proximal RCA stent, no ischemia on stress test in 2018  -lifelong plavix and aspirin (her decision after discussion of risks and benefits)  -continue coreg and statin  3. Palpitations- better on coreg, still had v-bigemeny on last holter  4. Hyperlipidemia- , cont crestor 20mg daily and zetia  -declined PCSK9 inhibition  5. Anxiety- better since she stopped checking her blood pressure at home  6. Reflux- on pepcid   7. Vit D deficiency 2000u/day  8. Impaired glucose tolerance-cont to exercise  9. CKD Stage 3-stable, crt 1-1.1, seen by Dr. Sera Lu. -hyperkalemia in the past improved with stopping spironolactone    10. Mild to moderate mitral regurgitation   11. PAD - bilateral carotid bruits, mild Bety and moderate LICA  12. Possible SS- was above watch for now and will get loop if sx progress. 13. Edema-better on the lower dose of amlodipine       Echo 8/20 EF 55% mild MR PAP 33    Carotid 6/22 Lica 50-08%, Bety <39%  Stress nuc 4/18 wnl  Echo 11/15 EF 60%, mild TR, PAP 25, mild-mod MR  Carotid US: Mod LICA plaque <31%, no evidence of Subclavian Steal.  Stress nuc: 1/14 normal, EF 69%  Echo 9/12 EF 60, mod MR, mild TR  Cath 2012 RCA stent     Fhx no early cad  Soc no tob, no etoh                    Objective  Objective note  HEENT - normal  Neck - supple, normal JVP  Lungs - clear lungs. No rales, rhonchi or wheezing  Heart - regular rhythm, normal S1 S2, no murmurs, rubs or gallops. PMI nondisplaced  Abd - soft, nontender, no HSM, no abd bruits  Extremities - no clubbing or cyanosis.  no lower extremity edema  Vascular - no carotid bruits, normal upstroke  radial pulses nl, L=R  pedal pulses nl, L=R  Neuro - alert, oriented, speech normal. nonfocal.

## 2022-11-17 ENCOUNTER — TELEPHONE (OUTPATIENT)
Dept: INTERNAL MEDICINE CLINIC | Age: 82
End: 2022-11-17

## 2022-11-17 NOTE — TELEPHONE ENCOUNTER
CLARIFICATION NEEDED      Rx losartan 50mg tab. Pt has retail hx of valsartan 320 mg-hctz 12.5 mg tab filled 10/14/2022. Clarify current therapy, and include new rx if current therapy is valsartan 320 mg-hctz 12.5 mg tab for 90DS.  (DUR)

## 2022-12-06 ENCOUNTER — TELEPHONE (OUTPATIENT)
Dept: INTERNAL MEDICINE CLINIC | Age: 82
End: 2022-12-06

## 2022-12-06 NOTE — TELEPHONE ENCOUNTER
Pt came in today and she's getting over some sort of sickness but she is still having some cough and congestion issues. She was hoping she could get an over the counter recommendation.

## 2022-12-06 NOTE — TELEPHONE ENCOUNTER
Last Tuesday pt started with cough, hoarse and runny nose. Took coricidin HBP for 4 days, no last couple days. She will restart coricidin, try loratadine, allegra or zyrtec and increase water intake. If not improving will call back for a virtual appointment.

## 2022-12-14 DIAGNOSIS — I10 ESSENTIAL HYPERTENSION: ICD-10-CM

## 2022-12-14 RX ORDER — CARVEDILOL 12.5 MG/1
TABLET ORAL
Qty: 180 TABLET | Refills: 2 | Status: SHIPPED | OUTPATIENT
Start: 2022-12-14

## 2023-04-17 ENCOUNTER — OFFICE VISIT (OUTPATIENT)
Dept: INTERNAL MEDICINE CLINIC | Age: 83
End: 2023-04-17
Payer: MEDICARE

## 2023-04-17 VITALS
TEMPERATURE: 97.6 F | RESPIRATION RATE: 12 BRPM | OXYGEN SATURATION: 97 % | DIASTOLIC BLOOD PRESSURE: 60 MMHG | WEIGHT: 122 LBS | BODY MASS INDEX: 20.83 KG/M2 | SYSTOLIC BLOOD PRESSURE: 132 MMHG | HEIGHT: 64 IN | HEART RATE: 75 BPM

## 2023-04-17 DIAGNOSIS — H61.23 BILATERAL IMPACTED CERUMEN: ICD-10-CM

## 2023-04-17 DIAGNOSIS — Z00.00 MEDICARE ANNUAL WELLNESS VISIT, SUBSEQUENT: Primary | ICD-10-CM

## 2023-04-17 DIAGNOSIS — E78.2 MIXED HYPERLIPIDEMIA: ICD-10-CM

## 2023-04-17 DIAGNOSIS — I25.10 CORONARY ARTERY DISEASE INVOLVING NATIVE CORONARY ARTERY OF NATIVE HEART WITHOUT ANGINA PECTORIS: ICD-10-CM

## 2023-04-17 DIAGNOSIS — I10 PRIMARY HYPERTENSION: ICD-10-CM

## 2023-04-17 DIAGNOSIS — K21.9 GASTROESOPHAGEAL REFLUX DISEASE, UNSPECIFIED WHETHER ESOPHAGITIS PRESENT: ICD-10-CM

## 2023-04-17 DIAGNOSIS — R73.01 IFG (IMPAIRED FASTING GLUCOSE): ICD-10-CM

## 2023-04-17 PROBLEM — J12.82 PNEUMONIA DUE TO COVID-19 VIRUS: Status: RESOLVED | Noted: 2022-01-04 | Resolved: 2023-04-17

## 2023-04-17 PROBLEM — U07.1 PNEUMONIA DUE TO COVID-19 VIRUS: Status: RESOLVED | Noted: 2022-01-04 | Resolved: 2023-04-17

## 2023-04-17 PROCEDURE — 3075F SYST BP GE 130 - 139MM HG: CPT | Performed by: INTERNAL MEDICINE

## 2023-04-17 PROCEDURE — 1123F ACP DISCUSS/DSCN MKR DOCD: CPT | Performed by: INTERNAL MEDICINE

## 2023-04-17 PROCEDURE — 1090F PRES/ABSN URINE INCON ASSESS: CPT | Performed by: INTERNAL MEDICINE

## 2023-04-17 PROCEDURE — G0439 PPPS, SUBSEQ VISIT: HCPCS | Performed by: INTERNAL MEDICINE

## 2023-04-17 PROCEDURE — G8400 PT W/DXA NO RESULTS DOC: HCPCS | Performed by: INTERNAL MEDICINE

## 2023-04-17 PROCEDURE — G8510 SCR DEP NEG, NO PLAN REQD: HCPCS | Performed by: INTERNAL MEDICINE

## 2023-04-17 PROCEDURE — G8420 CALC BMI NORM PARAMETERS: HCPCS | Performed by: INTERNAL MEDICINE

## 2023-04-17 PROCEDURE — G8427 DOCREV CUR MEDS BY ELIG CLIN: HCPCS | Performed by: INTERNAL MEDICINE

## 2023-04-17 PROCEDURE — 69209 REMOVE IMPACTED EAR WAX UNI: CPT | Performed by: INTERNAL MEDICINE

## 2023-04-17 PROCEDURE — 3078F DIAST BP <80 MM HG: CPT | Performed by: INTERNAL MEDICINE

## 2023-04-17 PROCEDURE — 99214 OFFICE O/P EST MOD 30 MIN: CPT | Performed by: INTERNAL MEDICINE

## 2023-04-17 PROCEDURE — G8536 NO DOC ELDER MAL SCRN: HCPCS | Performed by: INTERNAL MEDICINE

## 2023-04-17 PROCEDURE — 1101F PT FALLS ASSESS-DOCD LE1/YR: CPT | Performed by: INTERNAL MEDICINE

## 2023-04-17 RX ORDER — ASCORBIC ACID 500 MG
500 TABLET ORAL DAILY
COMMUNITY

## 2023-04-17 RX ORDER — VALSARTAN AND HYDROCHLOROTHIAZIDE 320; 12.5 MG/1; MG/1
TABLET, FILM COATED ORAL
COMMUNITY
Start: 2023-03-08

## 2023-04-17 NOTE — PATIENT INSTRUCTIONS
Medicare Wellness Visit, Female     The best way to live healthy is to have a lifestyle where you eat a well-balanced diet, exercise regularly, limit alcohol use, and quit all forms of tobacco/nicotine, if applicable. Regular preventive services are another way to keep healthy. Preventive services (vaccines, screening tests, monitoring & exams) can help personalize your care plan, which helps you manage your own care. Screening tests can find health problems at the earliest stages, when they are easiest to treat. [facility] follows the current, evidence-based guidelines published by the [facility] Dial[facility] ([facility]) when recommending preventive services for our patients. Because we follow these guidelines, sometimes recommendations change over time as research supports it. (For example, mammograms used to be recommended annually. Even though Medicare will still pay for an annual mammogram, the newer guidelines recommend a mammogram every two years for women of average risk). Of course, you and your doctor may decide to screen more often for some diseases, based on your risk and your co-morbidities (chronic disease you are already diagnosed with). Preventive services for you include:  - Medicare offers their members a free annual wellness visit, which is time for you and your primary care provider to discuss and plan for your preventive service needs.  Take advantage of this benefit every year!    -Over the age of 72 should receive the recommended pneumonia vaccines.    -All adults should have a flu vaccine yearly.  -All adults should have a tetanus vaccine every 10 years.   -Over the age 48 should receive the shingles vaccines.        -All adults should be screened once for Hepatitis C.  -All adults age 38-68 who are overweight should have a diabetes screening test once every three years.   -Other screening tests and preventive services for persons with diabetes include: an eye exam to screen for diabetic retinopathy, a kidney function test, a foot exam, and stricter control over your cholesterol.   -Cardiovascular screening for adults with routine risk involves an electrocardiogram (ECG) at intervals determined by your doctor.     -Colorectal cancer screenings should be done for adults age 39-70 with no increased risk factors for colorectal cancer. There are a number of acceptable methods of screening for this type of cancer. Each test has its own benefits and drawbacks. Discuss with your doctor what is most appropriate for you during your annual wellness visit. The different tests include: colonoscopy (considered the best screening method), a fecal occult blood test, a fecal DNA test, and sigmoidoscopy.    -Lung cancer screening is recommended annually with a low dose CT scan for adults between age 54 and 68, who have smoked at least 30 pack years (equivalent of 1 pack per day for 30 days), and who is a current smoker or quit less than 15 years ago.    -A bone mass density test is recommended when a woman turns 65 to screen for osteoporosis. This test is only recommended one time, as a screening. Some providers will use this same test as a disease monitoring tool if you already have osteoporosis. -Breast cancer screenings are recommended every other year for women of normal risk, age 54-69.    -Cervical cancer screenings for women over age 72 are only recommended with certain risk factors.      Here is a list of your current Health Maintenance items (your personalized list of preventive services) with a due date:  Health Maintenance Due   Topic Date Due    COVID-19 Vaccine (1) Never done    Shingles Vaccine (1 of 2) Never done    Pneumococcal Vaccine (1 - PCV) Never done

## 2023-04-17 NOTE — PROGRESS NOTES
Divine [facility]  Identified pt with two pt identifiers(name and ). Chief Complaint   Patient presents with    Annual Wellness Visit    Hypertension    GERD       Reviewed record In preparation for visit and have obtained necessary documentation. 1. Have you been to the ER, urgent care clinic or hospitalized since your last visit? No     2. Have you seen or consulted any other health care providers outside of the [address] [place] [address] since your last visit? Include any pap smears or colon screening. No    Vitals reviewed with provider. Health Maintenance reviewed: There are no preventive care reminders to display for this patient.          Wt Readings from Last 3 Encounters:   23 122 lb (55.3 kg)   22 121 lb 12.8 oz (55.2 kg)   22 123 lb 3.2 oz (55.9 kg)        Temp Readings from Last 3 Encounters:   23 97.6 °F (36.4 °C) (Oral)   22 98.3 °F (36.8 °C) (Oral)   22 98 °F (36.7 °C) (Oral)        BP Readings from Last 3 Encounters:   23 (!) 165/72   22 (!) 148/60   22 (!) 158/80        Pulse Readings from Last 3 Encounters:   23 75   22 (!) 57   22 74        Vitals:    23 1508   BP: (!) 165/72   Pulse: 75   Resp: 12   Temp: 97.6 °F (36.4 °C)   TempSrc: Oral   SpO2: 97%   Weight: 122 lb (55.3 kg)   Height: 5' 4\" (1.626 m)   PainSc:   0 - No pain          Learning Assessment:   :       Learning Assessment 2014   PRIMARY LEARNER Patient   HIGHEST LEVEL OF EDUCATION - PRIMARY LEARNER  DID NOT GRADUATE HIGH SCHOOL   BARRIERS PRIMARY LEARNER NONE   CO-LEARNER CAREGIVER No   PRIMARY LANGUAGE ENGLISH   LEARNER PREFERENCE PRIMARY DEMONSTRATION   ANSWERED BY patient   RELATIONSHIP SELF        Depression Screening:   :       3 most recent PHQ Screens 2023   Little interest or pleasure in doing things Not at all   Feeling down, depressed, irritable, or hopeless Not at all   Total Score PHQ 2 0        Fall Risk Assessment:   : Fall Risk Assessment, last 12 mths 4/17/2023   Able to walk? Yes   Fall in past 12 months? 0   Do you feel unsteady? 1   Are you worried about falling 1   Number of falls in past 12 months -   Fall with injury? -        Abuse Screening:   :       Abuse Screening Questionnaire 4/17/2023 5/12/2022 1/4/2022 8/28/2020 8/21/2019 8/10/2018 3/6/2017   Do you ever feel afraid of your partner? N N N N N N N   Are you in a relationship with someone who physically or mentally threatens you? N N N N N N N   Is it safe for you to go home?  Y Y Y Y Y Y Y        ADL Screening:   :       ADL Assessment 4/17/2023   Feeding yourself No Help Needed   Getting from bed to chair No Help Needed   Getting dressed No Help Needed   Bathing or showering No Help Needed   Walk across the room (includes cane/walker) No Help Needed   Using the telphone No Help Needed   Taking your medications No Help Needed   Preparing meals No Help Needed   Managing money (expenses/bills) No Help Needed   Moderately strenuous housework (laundry) No Help Needed   Shopping for personal items (toiletries/medicines) No Help Needed   Shopping for groceries No Help Needed   Driving No Help Needed   Climbing a flight of stairs No Help Needed   Getting to places beyond walking distances No Help Needed

## 2023-04-17 NOTE — PROGRESS NOTES
HPI  Ms. Ladi Crawley is a 79y.o. year old female, she is seen today for follow-up hypertension, GERD, annual this visit. She also sees cardiology. Had a cold with chest congestion, cough last month. Never tested for covid but thinks she might have had it. Feeling much better than a month ago. Still feeling tired but not as tired as she was initially. Biggest complaint is fatigue. But this has been a chronic complaint. Today is a pretty good day. No chest pain or sob. Cough lasted about a month. Not walking for exercise anymore - not since covid infection over a year ago. Feeling not as strong as she did prior - declines PT.     BP  121-141/60-69 with HR 62-72    Gerd overall controlled with famotidine and avoiding foods which flare - ie chocolate, tomatoes. Chief Complaint   Patient presents with    Annual Wellness Visit    Hypertension    GERD        Prior to Admission medications    Medication Sig Start Date End Date Taking? Authorizing Provider   ascorbic acid, vitamin C, (Vitamin C) 500 mg tablet Take 1 Tablet by mouth daily. Yes Provider, Historical   valsartan-hydroCHLOROthiazide (DIOVAN-HCT) 320-12.5 mg per tablet  3/8/23  Yes Provider, Historical   carvediloL (COREG) 12.5 mg tablet TAKE 1 TABLET TWICE DAILY WITH MEALS  Patient taking differently: Take 0.5 Tablets by mouth two (2) times a day. 12/14/22  Yes Chad Mercado MD   rosuvastatin (CRESTOR) 20 mg tablet TAKE 1 TABLET EVERY NIGHT 5/16/22  Yes Chad Mercado MD   famotidine (PEPCID) 20 mg tablet TAKE 1 TABLET TWICE DAILY 5/16/22  Yes Chad Mercado MD   ezetimibe (ZETIA) 10 mg tablet TAKE 1 TABLET EVERY DAY 8/9/21  Yes Prashanth Vu MD   clopidogreL (PLAVIX) 75 mg tab TAKE 1 TABLET EVERY DAY 6/4/21  Yes Prashanth Vu MD   amLODIPine (NORVASC) 5 mg tablet Take 1 Tablet by mouth daily.  6/3/21  Yes Prashanth Vu MD   nicotinic acid (NIACIN) 500 mg tablet Take 1 Tablet by mouth Daily (before breakfast). Yes Provider, Historical   cholecalciferol, vitamin D3, 50 mcg (2,000 unit) tab Take 1 Tablet by mouth daily. Yes Provider, Historical   aspirin 81 mg tablet Take 1 Tablet by mouth daily. Yes Provider, Historical   losartan (COZAAR) 50 mg tablet Take 1 Tablet by mouth two (2) times a day. 3/14/22 4/17/23  Marifer Hurst MD         Allergies   Allergen Reactions    Lisinopril Other (comments)     High K         REVIEW OF SYSTEMS:  Per HPI     PHYSICAL EXAM:  Visit Vitals  /60   Pulse 75   Temp 97.6 °F (36.4 °C) (Oral)   Resp 12   Ht 5' 4\" (1.626 m)   Wt 122 lb (55.3 kg)   SpO2 97%   BMI 20.94 kg/m²     Constitutional: Appears well-developed and well-nourished. No distress. HENT:   Head: Normocephalic and atraumatic. Eyes: No scleral icterus. Ears: tm's not fully visualized due to cerumen  Neck: no lad, no tm, supple   Cardiovascular: Normal S1/S2, regular rhythm. No murmurs, rubs, or gallops. Pulmonary/Chest: Effort normal and breath sounds normal. No respiratory distress. No wheezes, rhonchi, or rales. Ext: No edema. Neurological: Alert. Psychiatric: Normal mood and affect. Behavior is normal.     Lab Results   Component Value Date/Time    Sodium 143 05/16/2022 08:09 AM    Potassium 4.8 05/16/2022 08:09 AM    Chloride 106 05/16/2022 08:09 AM    CO2 20 05/16/2022 08:09 AM    Anion gap 6 01/06/2022 05:20 AM    Glucose 127 (H) 05/16/2022 08:09 AM    BUN 22 05/16/2022 08:09 AM    Creatinine 0.97 05/16/2022 08:09 AM    BUN/Creatinine ratio 23 05/16/2022 08:09 AM    GFR est AA >60 01/06/2022 05:20 AM    GFR est non-AA >60 01/06/2022 05:20 AM    Calcium 9.7 05/16/2022 08:09 AM    Bilirubin, total 0.5 05/16/2022 08:09 AM    Alk.  phosphatase 104 05/16/2022 08:09 AM    Protein, total 6.5 05/16/2022 08:09 AM    Albumin 4.4 05/16/2022 08:09 AM    Globulin 3.6 01/06/2022 05:20 AM    A-G Ratio 2.1 05/16/2022 08:09 AM    ALT (SGPT) 20 05/16/2022 08:09 AM     Lab Results   Component Value Date/Time    Hemoglobin A1c 6.3 (H) 10/11/2021 08:22 AM    Hemoglobin A1c 5.9 (H) 07/31/2020 09:37 AM    Hemoglobin A1c 5.9 (H) 08/21/2019 10:28 AM      Lab Results   Component Value Date/Time    Cholesterol, total 196 05/16/2022 08:09 AM    HDL Cholesterol 82 05/16/2022 08:09 AM    LDL, calculated 102 (H) 05/16/2022 08:09 AM    LDL, calculated 91 07/31/2020 09:37 AM    VLDL, calculated 12 05/16/2022 08:09 AM    VLDL, calculated 13 07/31/2020 09:37 AM    Triglyceride 64 05/16/2022 08:09 AM    CHOL/HDL Ratio 3.1 09/28/2011 05:00 AM          ASSESSMENT/PLAN  Diagnoses and all orders for this visit:    1. Medicare annual wellness visit, subsequent    2. Mixed hyperlipidemia  -     METABOLIC PANEL, COMPREHENSIVE; Future  -     LIPID PANEL; Future    3. Primary hypertension  -     CBC WITH AUTOMATED DIFF; Future    4. IFG (impaired fasting glucose)  -     HEMOGLOBIN A1C WITH EAG; Future    5. Bilateral impacted cerumen    6. Gastroesophageal reflux disease, unspecified whether esophagitis present    7. Coronary artery disease involving native coronary artery of native heart without angina pectoris  Comments:  s/p TRANG to RCA and mid LAD 3/11, cath 8/12 with proximal RCA stent, no ischemia on stress test in 2018  -lifelong plavix and aspirin         Cardiology manages lipids and CAD and blood pressure. Her blood pressure is at goal, she has no symptoms of ischemia. She tolerated irrigation for removal of impacted cerumen. GERD controlled with famotidine and food avoidance, continue famotidine. Offered referral to physical therapy to improve strength and balance but patient declines. She will continue to work on exercising at home including walking up and down the steps several times per day. There are no preventive care reminders to display for this patient. Follow-up and Dispositions    Return in about 6 months (around 10/17/2023) for bp, gerd. Reviewed plan of care.  Patient has provided input and agrees with goals. The nurse provided the patient and/or family with advanced directive information if needed and encouraged the patient to provide a copy to the office when available. This is the Subsequent Medicare Annual Wellness Exam, performed 12 months or more after the Initial AWV or the last Subsequent AWV    I have reviewed the patient's medical history in detail and updated the computerized patient record. Assessment/Plan   Education and counseling provided:  Are appropriate based on today's review and evaluation    1. Medicare annual wellness visit, subsequent  2. Mixed hyperlipidemia  -     METABOLIC PANEL, COMPREHENSIVE; Future  -     LIPID PANEL; Future  3. Primary hypertension  -     CBC WITH AUTOMATED DIFF; Future  4. IFG (impaired fasting glucose)  -     HEMOGLOBIN A1C WITH EAG; Future  5. Bilateral impacted cerumen  6. Gastroesophageal reflux disease, unspecified whether esophagitis present  7. Coronary artery disease involving native coronary artery of native heart without angina pectoris  Comments:  s/p TRANG to RCA and mid LAD 3/11, cath 8/12 with proximal RCA stent, no ischemia on stress test in 2018  -lifelong plavix and aspirin          Depression Risk Factor Screening     3 most recent PHQ Screens 4/17/2023   Little interest or pleasure in doing things Not at all   Feeling down, depressed, irritable, or hopeless Not at all   Total Score PHQ 2 0       Alcohol & Drug Abuse Risk Screen    Do you average more than 1 drink per night or more than 7 drinks a week:  No    On any one occasion in the past three months have you have had more than 3 drinks containing alcohol:  No          Functional Ability and Level of Safety    Hearing: Hearing is good. with hearing aids      Activities of Daily Living: The home contains: no safety equipment.   Patient does total self care      Ambulation: with mild difficulty     Fall Risk:  Fall Risk Assessment, last 12 mths 4/17/2023   Able to walk? Yes   Fall in past 12 months? 0   Do you feel unsteady? 1   Are you worried about falling 1   Number of falls in past 12 months -   Fall with injury? -      Abuse Screen:  Patient is not abused       Cognitive Screening    Has your family/caregiver stated any concerns about your memory: no     Cognitive Screening: n/a    Health Maintenance Due   There are no preventive care reminders to display for this patient.       Patient Care Team   Patient Care Team:  Preeti Hines MD as PCP - Norris Dao MD as PCP - [address] [place] [address]  [facility] Provider  Judd Silva MD (Cardiovascular Disease Physician)  Jo Ann Card OD (Optometry)    History     Patient Active Problem List   Diagnosis Code    HTN (hypertension) I10    Allergic rhinitis J30.9    CAD (coronary artery disease) I25.10    PAC (premature atrial contraction) I49.1    GERD (gastroesophageal reflux disease) K21.9    PVC (premature ventricular contraction) I49.3    Carotid artery stenosis I65.29    IFG (impaired fasting glucose) R73.01    Heart palpitations R00.2    Hyperlipidemia E78.5    Coronary artery disease involving native coronary artery of native heart without angina pectoris I25.10     Past Medical History:   Diagnosis Date    Acute respiratory failure with hypoxia ([facility] [address] 1/4/2022    CAD (coronary artery disease)     CKD (chronic kidney disease), stage III ([address] 9/26/2014    Followed by Dr. 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 Flor July      Cardiac Cath March 2011     Current Outpatient Medications   Medication Sig Dispense Refill ascorbic acid, vitamin C, (Vitamin C) 500 mg tablet Take 1 Tablet by mouth daily. valsartan-hydroCHLOROthiazide (DIOVAN-HCT) 320-12.5 mg per tablet       carvediloL (COREG) 12.5 mg tablet TAKE 1 TABLET TWICE DAILY WITH MEALS (Patient taking differently: Take 0.5 Tablets by mouth two (2) times a day.) 180 Tablet 2    rosuvastatin (CRESTOR) 20 mg tablet TAKE 1 TABLET EVERY NIGHT 90 Tablet 3    famotidine (PEPCID) 20 mg tablet TAKE 1 TABLET TWICE DAILY 180 Tablet 3    ezetimibe (ZETIA) 10 mg tablet TAKE 1 TABLET EVERY DAY 90 Tablet 1    clopidogreL (PLAVIX) 75 mg tab TAKE 1 TABLET EVERY DAY 90 Tablet 3    amLODIPine (NORVASC) 5 mg tablet Take 1 Tablet by mouth daily. 180 Tablet 3    nicotinic acid (NIACIN) 500 mg tablet Take 1 Tablet by mouth Daily (before breakfast). cholecalciferol, vitamin D3, 50 mcg (2,000 unit) tab Take 1 Tablet by mouth daily. aspirin 81 mg tablet Take 1 Tablet by mouth daily.        Allergies   Allergen Reactions    Lisinopril Other (comments)     High K       Family History   Problem Relation Age of Onset    Hypertension Mother     Diabetes Maternal Grandmother     Diabetes Paternal Grandfather      Social History     Tobacco Use    Smoking status: Never    Smokeless tobacco: Never   Substance Use Topics    Alcohol use: No         Arlette Silva MD

## 2023-04-20 LAB
ALBUMIN SERPL-MCNC: 4.8 G/DL (ref 3.6–4.6)
ALBUMIN/GLOB SERPL: 1.9 {RATIO} (ref 1.2–2.2)
ALP SERPL-CCNC: 115 IU/L (ref 44–121)
ALT SERPL-CCNC: 18 IU/L (ref 0–32)
AST SERPL-CCNC: 25 IU/L (ref 0–40)
BASOPHILS # BLD AUTO: 0 X10E3/UL (ref 0–0.2)
BASOPHILS NFR BLD AUTO: 1 %
BILIRUB SERPL-MCNC: 0.6 MG/DL (ref 0–1.2)
BUN SERPL-MCNC: 22 MG/DL (ref 8–27)
BUN/CREAT SERPL: 19 (ref 12–28)
CALCIUM SERPL-MCNC: 10.2 MG/DL (ref 8.7–10.3)
CHLORIDE SERPL-SCNC: 106 MMOL/L (ref 96–106)
CHOLEST SERPL-MCNC: 195 MG/DL (ref 100–199)
CO2 SERPL-SCNC: 21 MMOL/L (ref 20–29)
CREAT SERPL-MCNC: 1.13 MG/DL (ref 0.57–1)
EGFRCR SERPLBLD CKD-EPI 2021: 48 ML/MIN/1.73
EOSINOPHIL # BLD AUTO: 0.3 X10E3/UL (ref 0–0.4)
EOSINOPHIL NFR BLD AUTO: 7 %
ERYTHROCYTE [DISTWIDTH] IN BLOOD BY AUTOMATED COUNT: 12.4 % (ref 11.7–15.4)
EST. AVERAGE GLUCOSE BLD GHB EST-MCNC: 126 MG/DL
GLOBULIN SER CALC-MCNC: 2.5 G/DL (ref 1.5–4.5)
GLUCOSE SERPL-MCNC: 110 MG/DL (ref 70–99)
HBA1C MFR BLD: 6 % (ref 4.8–5.6)
HCT VFR BLD AUTO: 43.7 % (ref 34–46.6)
HDLC SERPL-MCNC: 76 MG/DL
HGB BLD-MCNC: 14.4 G/DL (ref 11.1–15.9)
IMM GRANULOCYTES # BLD AUTO: 0 X10E3/UL (ref 0–0.1)
IMM GRANULOCYTES NFR BLD AUTO: 0 %
LDLC SERPL CALC-MCNC: 103 MG/DL (ref 0–99)
LYMPHOCYTES # BLD AUTO: 1.1 X10E3/UL (ref 0.7–3.1)
LYMPHOCYTES NFR BLD AUTO: 22 %
MCH RBC QN AUTO: 29.3 PG (ref 26.6–33)
MCHC RBC AUTO-ENTMCNC: 33 G/DL (ref 31.5–35.7)
MCV RBC AUTO: 89 FL (ref 79–97)
MONOCYTES # BLD AUTO: 0.5 X10E3/UL (ref 0.1–0.9)
MONOCYTES NFR BLD AUTO: 9 %
NEUTROPHILS # BLD AUTO: 3.3 X10E3/UL (ref 1.4–7)
NEUTROPHILS NFR BLD AUTO: 61 %
PLATELET # BLD AUTO: 240 X10E3/UL (ref 150–450)
POTASSIUM SERPL-SCNC: 4.6 MMOL/L (ref 3.5–5.2)
PROT SERPL-MCNC: 7.3 G/DL (ref 6–8.5)
RBC # BLD AUTO: 4.91 X10E6/UL (ref 3.77–5.28)
SODIUM SERPL-SCNC: 145 MMOL/L (ref 134–144)
TRIGL SERPL-MCNC: 90 MG/DL (ref 0–149)
VLDLC SERPL CALC-MCNC: 16 MG/DL (ref 5–40)
WBC # BLD AUTO: 5.2 X10E3/UL (ref 3.4–10.8)

## 2023-04-21 DIAGNOSIS — R79.89 ELEVATED SERUM CREATININE: Primary | ICD-10-CM

## 2023-04-21 NOTE — PROGRESS NOTES
Tell her blood counts normal. A1C in prediabetes range, better than in the past. Liver labs normal, kidney labs slightly worse. Avoid nsaids such as ibuprofen, aleve. Get repeat BMP in approximately 1-2 mos. Cholesterol stable.

## 2023-05-08 DIAGNOSIS — R79.89 ELEVATED SERUM CREATININE: Primary | ICD-10-CM

## 2023-05-23 LAB
BUN SERPL-MCNC: 32 MG/DL (ref 8–27)
BUN/CREAT SERPL: 25 (ref 12–28)
CALCIUM SERPL-MCNC: 10.1 MG/DL (ref 8.7–10.3)
CHLORIDE SERPL-SCNC: 107 MMOL/L (ref 96–106)
CO2 SERPL-SCNC: 20 MMOL/L (ref 20–29)
CREAT SERPL-MCNC: 1.29 MG/DL (ref 0.57–1)
EGFRCR SERPLBLD CKD-EPI 2021: 41 ML/MIN/1.73
GLUCOSE SERPL-MCNC: 97 MG/DL (ref 70–99)
POTASSIUM SERPL-SCNC: 5.3 MMOL/L (ref 3.5–5.2)
SODIUM SERPL-SCNC: 143 MMOL/L (ref 134–144)

## 2023-05-24 RX ORDER — FAMOTIDINE 20 MG/1
TABLET, FILM COATED ORAL
Qty: 180 TABLET | Refills: 2 | Status: SHIPPED | OUTPATIENT
Start: 2023-05-24

## 2023-05-24 NOTE — TELEPHONE ENCOUNTER
PCP: Violeta Hameed MD     Last appt: 4/17/2023      Future Appointments   Date Time Provider Zulema Wise   10/26/2023  9:00 AM Nati Oneill MD [facility] BS AMB          Requested Prescriptions     Pending Prescriptions Disp Refills    famotidine (PEPCID) 20 MG tablet [Pharmacy Med Name: FAMOTIDINE 20 MG Tablet] 180 tablet      Sig: TAKE 1 TABLET TWICE DAILY

## 2023-06-04 DIAGNOSIS — N18.32 STAGE 3B CHRONIC KIDNEY DISEASE (HCC): ICD-10-CM

## 2023-06-04 DIAGNOSIS — N18.32 STAGE 3B CHRONIC KIDNEY DISEASE (HCC): Primary | ICD-10-CM

## 2023-06-06 ENCOUNTER — TELEPHONE (OUTPATIENT)
Facility: CLINIC | Age: 83
End: 2023-06-06

## 2023-06-06 RX ORDER — ASPIRIN 81 MG/1
81 TABLET ORAL DAILY
COMMUNITY

## 2023-06-06 RX ORDER — VALSARTAN AND HYDROCHLOROTHIAZIDE 320; 12.5 MG/1; MG/1
1 TABLET, FILM COATED ORAL DAILY
COMMUNITY
Start: 2023-05-23

## 2023-06-06 NOTE — TELEPHONE ENCOUNTER
Pt stated she is not taking the losartan. Per Dr Anita Noble note 10/14 states \"today will cut coreg to 6.25mg daily and stop losartan, replace with valsartanHCT\". Medication list updated.

## 2023-06-06 NOTE — TELEPHONE ENCOUNTER
----- Message from Campos Hernandez MD sent at 6/4/2023  5:03 PM EDT -----  Tell her creatinine is higher - decrease losartan to 25mg bid and recheck bmp in about 2 weeks - call if BP higher

## 2023-06-06 NOTE — TELEPHONE ENCOUNTER
For now break valsartan/hct in 1/2 instead of stopping losartan which she already had stopped. Follow up with Dr. Ross Velásquez for BP.

## 2023-06-08 NOTE — TELEPHONE ENCOUNTER
Pt will cut the valsartan/hct in half, get lab work in 2 weeks and schedule an appointment with Dr Courtney Allen in 3 weeks.

## 2023-07-03 ENCOUNTER — OFFICE VISIT (OUTPATIENT)
Age: 83
End: 2023-07-03

## 2023-07-03 VITALS
SYSTOLIC BLOOD PRESSURE: 160 MMHG | DIASTOLIC BLOOD PRESSURE: 80 MMHG | OXYGEN SATURATION: 100 % | TEMPERATURE: 98.9 F | WEIGHT: 121 LBS | RESPIRATION RATE: 18 BRPM | HEART RATE: 63 BPM | BODY MASS INDEX: 20.77 KG/M2

## 2023-07-03 DIAGNOSIS — B02.9 HERPES ZOSTER WITHOUT COMPLICATION: Primary | ICD-10-CM

## 2023-07-03 RX ORDER — VALACYCLOVIR HYDROCHLORIDE 1 G/1
1 TABLET, FILM COATED ORAL DAILY
Qty: 7 TABLET | Refills: 0 | Status: SHIPPED | OUTPATIENT
Start: 2023-07-03 | End: 2023-07-10

## 2023-07-03 NOTE — PROGRESS NOTES
Subjective: (As above and below)     The patient/guardian gave verbal consent to treat. Chief Complaint   Patient presents with    Insect Bite       Laura Isabel is a 79 y.o. female who presents for evaluation of :   Rash on right side  Possible bug bites  Itchy irritated onset a few days ago  No fever or chills  No known exposures feels well otherwise. Estimated Creatinine Clearance: 29 mL/min (A) (based on SCr of 1.29 mg/dL (H)). Review of Systems    Review of Systems - negative except as listed above    Reviewed PmHx, RxHx, FmHx, SocHx, AllgHx and updated in chart. Family History   Problem Relation Age of Onset    Diabetes Paternal Grandfather     Diabetes Maternal Grandmother     Hypertension Mother        Past Medical History:   Diagnosis Date    Acute respiratory failure with hypoxia ([address]) 1/4/2022    CAD (coronary artery disease)     CKD (chronic kidney disease), stage III ([address]) 9/26/2014    Followed by Dr. 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    HTN (hypertension) 10/1/2010    Hyperlipidemia     Hypertension     Pneumonia due to COVID-19 virus 1/4/2022      Social History     Socioeconomic History    Marital status:       Spouse name: None    Number of children: None    Years of education: None    Highest education level: None   Tobacco Use    Smoking status: Never    Smokeless tobacco: Never   Substance and Sexual Activity    Alcohol use: No    Drug use: No     Social Determinants of Health     Financial Resource Strain: Low Risk     Difficulty of Paying Living Expenses: Not hard at all   Food Insecurity: No Food Insecurity    Worried About Running Out of Food in the Last Year: Never true    Ran Out of Food in the Last Year: Never true          Current Outpatient Medications   Medication Sig    valACYclovir (VALTREX) 1 g tablet Take 1 tablet by mouth daily for 7 days    valsartan-hydroCHLOROthiazide (DIOVAN-HCT) 320-12.5 MG per tablet 1 tablet daily    aspirin

## 2023-07-04 LAB
BUN SERPL-MCNC: 25 MG/DL (ref 8–27)
BUN/CREAT SERPL: 21 (ref 12–28)
CALCIUM SERPL-MCNC: 10 MG/DL (ref 8.7–10.3)
CHLORIDE SERPL-SCNC: 104 MMOL/L (ref 96–106)
CO2 SERPL-SCNC: 22 MMOL/L (ref 20–29)
CREAT SERPL-MCNC: 1.18 MG/DL (ref 0.57–1)
EGFRCR SERPLBLD CKD-EPI 2021: 46 ML/MIN/1.73
GLUCOSE SERPL-MCNC: 116 MG/DL (ref 70–99)
POTASSIUM SERPL-SCNC: 4.9 MMOL/L (ref 3.5–5.2)
SODIUM SERPL-SCNC: 139 MMOL/L (ref 134–144)

## 2023-07-17 ENCOUNTER — TELEPHONE (OUTPATIENT)
Facility: CLINIC | Age: 83
End: 2023-07-17

## 2023-07-17 NOTE — TELEPHONE ENCOUNTER
Pain right side and goes around to her back. Patient does not have appendix. Pain has been growing for 1 week now.  Eulalia Kim has  no appointments, please triage

## 2023-07-17 NOTE — TELEPHONE ENCOUNTER
Pt was seen in UC on 7/3, diagnosed with shingles. Since having shingles she has had pain on her right side. Pain is 7/10 general, constant soreness with a burning sensation.

## 2023-07-18 ENCOUNTER — OFFICE VISIT (OUTPATIENT)
Facility: CLINIC | Age: 83
End: 2023-07-18
Payer: MEDICARE

## 2023-07-18 VITALS
HEIGHT: 64 IN | RESPIRATION RATE: 12 BRPM | SYSTOLIC BLOOD PRESSURE: 136 MMHG | WEIGHT: 118 LBS | DIASTOLIC BLOOD PRESSURE: 70 MMHG | OXYGEN SATURATION: 98 % | BODY MASS INDEX: 20.14 KG/M2 | HEART RATE: 74 BPM | TEMPERATURE: 98.3 F

## 2023-07-18 DIAGNOSIS — B02.29 POSTHERPETIC NEURALGIA: Primary | ICD-10-CM

## 2023-07-18 DIAGNOSIS — N18.31 STAGE 3A CHRONIC KIDNEY DISEASE (HCC): ICD-10-CM

## 2023-07-18 PROCEDURE — 3078F DIAST BP <80 MM HG: CPT | Performed by: INTERNAL MEDICINE

## 2023-07-18 PROCEDURE — 1123F ACP DISCUSS/DSCN MKR DOCD: CPT | Performed by: INTERNAL MEDICINE

## 2023-07-18 PROCEDURE — 99214 OFFICE O/P EST MOD 30 MIN: CPT | Performed by: INTERNAL MEDICINE

## 2023-07-18 PROCEDURE — 3075F SYST BP GE 130 - 139MM HG: CPT | Performed by: INTERNAL MEDICINE

## 2023-07-18 RX ORDER — GABAPENTIN 100 MG/1
100-300 CAPSULE ORAL NIGHTLY
Qty: 90 CAPSULE | Refills: 1 | Status: SHIPPED | OUTPATIENT
Start: 2023-07-18 | End: 2023-10-16

## 2023-07-18 RX ORDER — ASCORBIC ACID 500 MG
500 TABLET ORAL DAILY
COMMUNITY

## 2023-07-18 SDOH — ECONOMIC STABILITY: HOUSING INSECURITY
IN THE LAST 12 MONTHS, WAS THERE A TIME WHEN YOU DID NOT HAVE A STEADY PLACE TO SLEEP OR SLEPT IN A SHELTER (INCLUDING NOW)?: NO

## 2023-07-18 SDOH — ECONOMIC STABILITY: INCOME INSECURITY: HOW HARD IS IT FOR YOU TO PAY FOR THE VERY BASICS LIKE FOOD, HOUSING, MEDICAL CARE, AND HEATING?: NOT HARD AT ALL

## 2023-07-18 SDOH — ECONOMIC STABILITY: FOOD INSECURITY: WITHIN THE PAST 12 MONTHS, THE FOOD YOU BOUGHT JUST DIDN'T LAST AND YOU DIDN'T HAVE MONEY TO GET MORE.: NEVER TRUE

## 2023-07-18 SDOH — ECONOMIC STABILITY: FOOD INSECURITY: WITHIN THE PAST 12 MONTHS, YOU WORRIED THAT YOUR FOOD WOULD RUN OUT BEFORE YOU GOT MONEY TO BUY MORE.: NEVER TRUE

## 2023-07-18 NOTE — PROGRESS NOTES
HPI  Ms. Pranay Starkey is a 79y.o. year old female, she is seen today for RLQ and right flank pain in area of shingles. Has had pain in area for about 3 weeks, diagnosed with shingles on 7/3. No n/v, no f/c. Pain is burning, aching. No blood in urine, no frequency. No constipation or diarrhea. Not taking anything for pain. Pain is better with sitting, worse with movement. Also stressed. Her daughter with early dementia and in her 62s has moved in with her. Chief Complaint   Patient presents with    Side Pain        Prior to Admission medications    Medication Sig Start Date End Date Taking? Authorizing Provider   vitamin C (ASCORBIC ACID) 500 MG tablet Take 1 tablet by mouth daily   Yes Historical Provider, MD   gabapentin (NEURONTIN) 100 MG capsule Take 1-3 capsules by mouth nightly for 90 days.  Max Daily Amount: 300 mg 7/18/23 10/16/23 Yes Yair Logan MD   valsartan-hydroCHLOROthiazide (DIOVAN-HCT) 320-12.5 MG per tablet 0.5 tablets daily 5/23/23  Yes Historical Provider, MD   aspirin 81 MG EC tablet Take 1 tablet by mouth daily   Yes Historical Provider, MD   famotidine (PEPCID) 20 MG tablet TAKE 1 TABLET TWICE DAILY 5/24/23  Yes Yair Logan MD   amLODIPine (NORVASC) 5 MG tablet Take 1 tablet by mouth daily 6/3/21  Yes Ar Automatic Reconciliation   carvedilol (COREG) 12.5 MG tablet Take 0.5 tablets by mouth 2 times daily (with meals) 12/14/22  Yes Ar Automatic Reconciliation   Cholecalciferol 50 MCG (2000 UT) TABS Take 1 tablet by mouth daily   Yes Ar Automatic Reconciliation   clopidogrel (PLAVIX) 75 MG tablet TAKE 1 TABLET EVERY DAY 6/4/21  Yes Ar Automatic Reconciliation   ezetimibe (ZETIA) 10 MG tablet TAKE 1 TABLET EVERY DAY 8/9/21  Yes Ar Automatic Reconciliation   niacin 500 MG tablet Take 1 tablet by mouth every morning (before breakfast)   Yes Ar Automatic Reconciliation   rosuvastatin (CRESTOR) 20 MG tablet TAKE 1 TABLET EVERY NIGHT 5/16/22  Yes Ar Automatic Reconciliation

## 2023-08-29 ENCOUNTER — OFFICE VISIT (OUTPATIENT)
Facility: CLINIC | Age: 83
End: 2023-08-29
Payer: MEDICARE

## 2023-08-29 VITALS
HEIGHT: 64 IN | SYSTOLIC BLOOD PRESSURE: 132 MMHG | HEART RATE: 64 BPM | WEIGHT: 119.5 LBS | BODY MASS INDEX: 20.4 KG/M2 | OXYGEN SATURATION: 98 % | TEMPERATURE: 98.6 F | DIASTOLIC BLOOD PRESSURE: 65 MMHG | RESPIRATION RATE: 12 BRPM

## 2023-08-29 DIAGNOSIS — B02.29 POSTHERPETIC NEURALGIA: ICD-10-CM

## 2023-08-29 DIAGNOSIS — N18.31 STAGE 3A CHRONIC KIDNEY DISEASE (HCC): ICD-10-CM

## 2023-08-29 DIAGNOSIS — R53.82 CHRONIC FATIGUE: ICD-10-CM

## 2023-08-29 DIAGNOSIS — I10 PRIMARY HYPERTENSION: Primary | ICD-10-CM

## 2023-08-29 DIAGNOSIS — K21.9 GASTROESOPHAGEAL REFLUX DISEASE, UNSPECIFIED WHETHER ESOPHAGITIS PRESENT: ICD-10-CM

## 2023-08-29 PROCEDURE — G8427 DOCREV CUR MEDS BY ELIG CLIN: HCPCS | Performed by: INTERNAL MEDICINE

## 2023-08-29 PROCEDURE — G8420 CALC BMI NORM PARAMETERS: HCPCS | Performed by: INTERNAL MEDICINE

## 2023-08-29 PROCEDURE — 99214 OFFICE O/P EST MOD 30 MIN: CPT | Performed by: INTERNAL MEDICINE

## 2023-08-29 PROCEDURE — G8400 PT W/DXA NO RESULTS DOC: HCPCS | Performed by: INTERNAL MEDICINE

## 2023-08-29 PROCEDURE — 3075F SYST BP GE 130 - 139MM HG: CPT | Performed by: INTERNAL MEDICINE

## 2023-08-29 PROCEDURE — 1123F ACP DISCUSS/DSCN MKR DOCD: CPT | Performed by: INTERNAL MEDICINE

## 2023-08-29 PROCEDURE — 3078F DIAST BP <80 MM HG: CPT | Performed by: INTERNAL MEDICINE

## 2023-08-29 PROCEDURE — 1090F PRES/ABSN URINE INCON ASSESS: CPT | Performed by: INTERNAL MEDICINE

## 2023-08-29 PROCEDURE — 1036F TOBACCO NON-USER: CPT | Performed by: INTERNAL MEDICINE

## 2023-08-29 NOTE — PROGRESS NOTES
HPI  Ms. Krysta Montoya is a 79y.o. year old female, she is seen today for follow up postherpetic neuralgia. Plan last visit:  Pain from postherpetic neuralgia, start gabapentin as above. Caution sedation. If pain is not controlled she will let me know. She will titrate up slowly. Only took gabapentin 100mg hs for about 3 weeks but pain resolved and no longer needs it. BP has been well controlled - recent stress test but she doesn't have results yet - also wore heart monitor x 3 days and waiting to hear results. Has appointment scheduled in the next few weeks. Will also get \"weak spells\" intermittently for years. No chest pain or sob. Still getting used to daughter with dementia and her dog who moved in with her. Son also lives with her. Since having covid hasn't as been as strong. Used to walk daily, now harder since covid. Chief Complaint   Patient presents with    Neurologic Problem        Prior to Admission medications    Medication Sig Start Date End Date Taking?  Authorizing Provider   vitamin C (ASCORBIC ACID) 500 MG tablet Take 1 tablet by mouth daily   Yes Historical Provider, MD   valsartan-hydroCHLOROthiazide (DIOVAN-HCT) 320-12.5 MG per tablet 0.5 tablets daily 5/23/23  Yes Historical Provider, MD   aspirin 81 MG EC tablet Take 1 tablet by mouth daily   Yes Historical Provider, MD   famotidine (PEPCID) 20 MG tablet TAKE 1 TABLET TWICE DAILY 5/24/23  Yes Jose Segundo MD   amLODIPine (NORVASC) 5 MG tablet Take 1 tablet by mouth daily 6/3/21  Yes Ar Automatic Reconciliation   carvedilol (COREG) 12.5 MG tablet Take 0.5 tablets by mouth 2 times daily (with meals) 12/14/22  Yes Ar Automatic Reconciliation   Cholecalciferol 50 MCG (2000 UT) TABS Take 1 tablet by mouth daily   Yes Ar Automatic Reconciliation   clopidogrel (PLAVIX) 75 MG tablet TAKE 1 TABLET EVERY DAY 6/4/21  Yes Ar Automatic Reconciliation   ezetimibe (ZETIA) 10 MG tablet TAKE 1 TABLET EVERY DAY 8/9/21  Yes Ar

## 2023-09-14 RX ORDER — ROSUVASTATIN CALCIUM 20 MG/1
20 TABLET, COATED ORAL NIGHTLY
Qty: 90 TABLET | Refills: 3 | Status: SHIPPED | OUTPATIENT
Start: 2023-09-14

## 2023-09-14 NOTE — TELEPHONE ENCOUNTER
PCP: Jeffrey Brand MD     Last appt:  8/29/2023      Future Appointments   Date Time Provider [address]   10/26/2023  9:00 AM Jeffrey Brand MD [facility] AMB          Requested Prescriptions     Pending Prescriptions Disp Refills    rosuvastatin (CRESTOR) 20 MG tablet 90 tablet 3     Sig: Take 1 tablet by mouth nightly

## 2023-10-26 ENCOUNTER — OFFICE VISIT (OUTPATIENT)
Facility: CLINIC | Age: 83
End: 2023-10-26
Payer: MEDICARE

## 2023-10-26 VITALS
HEIGHT: 64 IN | TEMPERATURE: 98.3 F | RESPIRATION RATE: 12 BRPM | DIASTOLIC BLOOD PRESSURE: 67 MMHG | OXYGEN SATURATION: 98 % | HEART RATE: 61 BPM | BODY MASS INDEX: 20.4 KG/M2 | SYSTOLIC BLOOD PRESSURE: 125 MMHG | WEIGHT: 119.5 LBS

## 2023-10-26 DIAGNOSIS — K21.9 GASTROESOPHAGEAL REFLUX DISEASE, UNSPECIFIED WHETHER ESOPHAGITIS PRESENT: ICD-10-CM

## 2023-10-26 DIAGNOSIS — N18.31 STAGE 3A CHRONIC KIDNEY DISEASE (HCC): ICD-10-CM

## 2023-10-26 DIAGNOSIS — E78.2 MIXED HYPERLIPIDEMIA: ICD-10-CM

## 2023-10-26 DIAGNOSIS — R20.0 NUMBNESS AND TINGLING IN RIGHT HAND: ICD-10-CM

## 2023-10-26 DIAGNOSIS — R73.01 IFG (IMPAIRED FASTING GLUCOSE): ICD-10-CM

## 2023-10-26 DIAGNOSIS — I10 PRIMARY HYPERTENSION: Primary | ICD-10-CM

## 2023-10-26 DIAGNOSIS — R20.2 NUMBNESS AND TINGLING IN RIGHT HAND: ICD-10-CM

## 2023-10-26 PROCEDURE — 3078F DIAST BP <80 MM HG: CPT | Performed by: INTERNAL MEDICINE

## 2023-10-26 PROCEDURE — G8484 FLU IMMUNIZE NO ADMIN: HCPCS | Performed by: INTERNAL MEDICINE

## 2023-10-26 PROCEDURE — 99214 OFFICE O/P EST MOD 30 MIN: CPT | Performed by: INTERNAL MEDICINE

## 2023-10-26 PROCEDURE — 1090F PRES/ABSN URINE INCON ASSESS: CPT | Performed by: INTERNAL MEDICINE

## 2023-10-26 PROCEDURE — 1123F ACP DISCUSS/DSCN MKR DOCD: CPT | Performed by: INTERNAL MEDICINE

## 2023-10-26 PROCEDURE — G8400 PT W/DXA NO RESULTS DOC: HCPCS | Performed by: INTERNAL MEDICINE

## 2023-10-26 PROCEDURE — 1036F TOBACCO NON-USER: CPT | Performed by: INTERNAL MEDICINE

## 2023-10-26 PROCEDURE — G8427 DOCREV CUR MEDS BY ELIG CLIN: HCPCS | Performed by: INTERNAL MEDICINE

## 2023-10-26 PROCEDURE — G8420 CALC BMI NORM PARAMETERS: HCPCS | Performed by: INTERNAL MEDICINE

## 2023-10-26 PROCEDURE — 3074F SYST BP LT 130 MM HG: CPT | Performed by: INTERNAL MEDICINE

## 2023-10-26 RX ORDER — LANOLIN ALCOHOL/MO/W.PET/CERES
1000 CREAM (GRAM) TOPICAL DAILY
COMMUNITY

## 2023-10-26 RX ORDER — MULTIVITAMIN WITH IRON
250 TABLET ORAL DAILY
COMMUNITY

## 2023-11-09 RX ORDER — CARVEDILOL 12.5 MG/1
12.5 TABLET ORAL 2 TIMES DAILY WITH MEALS
Qty: 180 TABLET | Refills: 10 | Status: SHIPPED | OUTPATIENT
Start: 2023-11-09

## 2023-11-09 NOTE — TELEPHONE ENCOUNTER
PCP: Rosalio Galeazzi, MD     Last appt:  10/26/2023      Future Appointments   Date Time Provider [facility] [address]   4/26/2024  9:00 AM Rosalio Galeazzi, MD [facility] BS AMB          Requested Prescriptions     Pending Prescriptions Disp Refills    carvedilol (COREG) 12.5 MG tablet [Pharmacy Med Name: CARVEDILOL 12.5 MG Tablet] 180 tablet 10     Sig: TAKE 1 TABLET TWICE DAILY WITH MEALS

## 2024-02-06 NOTE — PROGRESS NOTES
Christiano Bajwa VIRTUAL VISIT DOCUMENTATION     Pursuant to the emergency declaration under the [facility], 1135 waiver authority and the [employer] Act, this Virtual  Visit was conducted, with patient's consent, to reduce the patient's risk of exposure to COVID-19 and provide continuity of care for an established patient. Services were provided through a synchronous discussion virtually to substitute for in-person clinic visit. CHIEF COMPLAINT      Hayley Key is a [de-identified] y.o. female who was seen by synchronous (real-time) audio-video technology on 6/2/2020. Patient is being seen today for HTN, GERD, XOL. She is doing ok, has some stressful days and sometimes has weak spells. Like all of a sudden the legs get weak and she has to sit down. Lasts a few minutes. She has not checked her bp or her pulse with that. I have asked her to check it   Also has some swelling in the feet, toes get numb at times. Suspect it is related. Could be early SS- will reduce coreg and see if she feels better. Followup in one month to see how her bp and pulse numbers are doing. Having some palpitations in the middle of the night. Lots of caregiver strain with her . HR racing at night and skipping when she gets back to bed. Reviewed her echo and carotid artery studies. She wonders about labs. ASSESSMENT      1. HTN - great today will reduce coreg as above. .   2. CAD- stable, s/p TRANG to RCA and mid LAD 3/11, cath 8/12 with proximal RCA stent, no ischemia on stress test in 2018  -lifelong plavix and aspirin (her decision after discussion of risks and benefits)  -continue coreg and statin  3. Palpitations- better on coreg, still had v-bigemeny on last holter  4. Hyperlipidemia- LDL 86 in 8/19 on crestor 20mg daily and zetia  -declined PCSK9 inhibition in the past   5.  Anxiety- better since she stopped checking her blood Education provided the Patient on the following:    - Nothing to Eat or Drink after MN the night before the procedure  -You will need to have someone drive you home after your EGD and remain with you for 24 hours after the EGD  - The date of your EGD, your are welcome to have one visitor at bedside or remain within 10-15 minutes of [employer] [place]  -Please wear warm socks when you arrive for your EGD  -Remove all jewelry and leave any valuables before arriving on the date of your procedure (all will have to be removed before leaving preop)  -You will need to arrive at 1030 on 2/7 EGD  -Feel free to contact us at: [phone] with any additional questions/concerns       pressure at home  6. Reflux- on pepcid   7. Vit D deficiency 2000u/day  8. Impaired glucose tolerance- 5.9%, exercising regularly   9. CKD Stage 3-stable, last creatinine 1.0, seen by Dr. Brian Mohan. -hyperkalemia in the past improved with stopping spironolactone  -last K 5.2 in 8/19  10. Mild to moderate mitral regurgitation- by TTE 11/15, will plan to repeat TTE at her next visit  11. PAD - bilateral carotid bruits, mild Bety dn moderate LICA  12. Possible SS- was above watch for now and will get loop if sx progress. 13. Edema- diuretic versus reducing amlodipine- fu one month     Stress nuc 4/18 wnl  Echo 11/15 EF 60%, mild TR, PAP 25, mild-mod MR  Carotid US: Mod LICA plaque <39%, no evidence of Subclavian Steal.  Stress nuc: 1/14 normal, EF 69%  Echo 9/12 EF 60, mod MR, mild TR  Cath 2012 RCA stent     Fhx no early cad  Soc no tob, no etoh  We discussed the expected course, resolution and complications of the diagnosis(es) in detail. Medication risks, benefits, costs, interactions, and alternatives were discussed as indicated. I advised her to contact the office if her condition worsens, changes or fails to improve as anticipated.  She expressed understanding with the diagnosis(es) and plan    HISTORY OF PRESENTING ILLNESS      Libby Guevara is a [de-identified] y.o. female        ACTIVE PROBLEM LIST     Patient Active Problem List    Diagnosis Date Noted    Coronary artery disease involving native coronary artery of native heart without angina pectoris 04/18/2018    Heart palpitations 10/18/2017    Hyperlipidemia 10/18/2017    IFG (impaired fasting glucose) 06/13/2014    Carotid artery stenosis 03/13/2014    PVC (premature ventricular contraction) 11/07/2013    GERD (gastroesophageal reflux disease) 02/13/2013    Esophageal reflux 01/23/2012    Other and unspecified hyperlipidemia 01/23/2012    PAC (premature atrial contraction) 09/27/2011    CAD (coronary artery disease) 04/04/2011    Allergic rhinitis 11/03/2010    HTN (hypertension) 10/01/2010           PAST MEDICAL HISTORY     Past Medical History:   Diagnosis Date    CAD (coronary artery disease)     CKD (chronic kidney disease), stage III ([address] 9/26/2014    Followed by Dr. 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 Francesca Rubio Lisinopril Other (comments)     High K          FAMILY HISTORY     Family History   Problem Relation Age of Onset    Hypertension Mother     Diabetes Maternal Grandmother     Diabetes Paternal Grandfather     negative for cardiac disease       SOCIAL HISTORY     Social History     Socioeconomic History    Marital status:      Spouse name: Not on file    Number of children: Not on file    Years of education: Not on file    Highest education level: Not on file   Tobacco Use    Smoking status: Never Smoker    Smokeless tobacco: Never Used   Substance and Sexual Activity    Alcohol use: No    Drug use: No    Sexual activity: Never         MEDICATIONS     Current Outpatient Medications   Medication Sig    famotidine (PEPCID) 20 mg tablet TAKE 1 TABLET TWICE DAILY    cyanocobalamin 1,000 mcg tablet Take 1,000 mcg by mouth daily.  amLODIPine (NORVASC) 5 mg tablet Take 2 Tabs by mouth daily.  losartan (COZAAR) 25 mg tablet Take 1 Tab by mouth daily.  rosuvastatin (CRESTOR) 20 mg tablet Take 1 Tab by mouth nightly.  clopidogrel (PLAVIX) 75 mg tab Take 1 Tab by mouth daily.  ezetimibe (ZETIA) 10 mg tablet Take 1 Tab by mouth daily.     carvedilol (COREG) 6.25 mg tablet Take 1 Tab by mouth two (2) times a day.  nicotinic acid (NIACIN) 500 mg tablet Take 500 mg by mouth Daily (before breakfast).  cholecalciferol, vitamin D3, (VITAMIN D3) 2,000 unit tab Take 1 Tab by mouth daily.  aspirin 81 mg tablet Take 81 mg by mouth daily. No current facility-administered medications for this visit. I have reviewed the nurses notes, vitals, problem list, allergy list, medical history, family, social history and medications. REVIEW OF SYMPTOMS     Constitutional: Negative for fever, chills, malaise/fatigue and diaphoresis. Respiratory: Negative for cough, hemoptysis, sputum production, shortness of breath and wheezing. Cardiovascular: Negative for chest pain, palpitations, orthopnea, claudication, leg swelling and PND. Gastrointestinal: Negative for heartburn, nausea, vomiting, blood in stool and melena. Genitourinary: Negative for dysuria and flank pain. Musculoskeletal: Negative for joint pain and back pain. Skin: Negative for rash. Neurological: Negative for focal weakness, seizures, loss of consciousness, weakness and headaches. Endo/Heme/Allergies: Negative for abnormal bleeding. Psychiatric/Behavioral: Negative for memory loss. PHYSICAL EXAMINATION      Due to this being a TeleHealth evaluation, many elements of the physical examination are unable to be assessed. General:  in no acute distress, cooperative and alert  Respiratory: No audible wheezing, no signs of respiratory distress  Neuro: A&Ox3, speech clear,  answering questions appropriately  Breathing unlabored  Mentation logical, thoughts ordered       DIAGNOSTIC DATA      No specialty comments available.        LABORATORY DATA      Lab Results   Component Value Date/Time    WBC 7.0 08/17/2019 09:22 PM    HGB 14.0 08/17/2019 09:22 PM    HCT 43.0 08/17/2019 09:22 PM    PLATELET 861 29/61/1384 09:22 PM    MCV 90.0 08/17/2019 09:22 PM      Lab Results Component Value Date/Time    Sodium 145 (H) 08/21/2019 10:28 AM    Potassium 5.2 08/21/2019 10:28 AM    Chloride 106 08/21/2019 10:28 AM    CO2 24 08/21/2019 10:28 AM    Anion gap 10 08/17/2019 09:22 PM    Glucose 116 (H) 08/21/2019 10:28 AM    BUN 22 08/21/2019 10:28 AM    Creatinine 1.01 (H) 08/21/2019 10:28 AM    BUN/Creatinine ratio 22 08/21/2019 10:28 AM    GFR est AA 61 08/21/2019 10:28 AM    GFR est non-AA 53 (L) 08/21/2019 10:28 AM    Calcium 10.0 08/21/2019 10:28 AM    Bilirubin, total 0.8 08/21/2019 10:28 AM    Alk. phosphatase 99 08/21/2019 10:28 AM    Protein, total 7.0 08/21/2019 10:28 AM    Albumin 4.6 08/21/2019 10:28 AM    Globulin 3.4 08/17/2019 09:22 PM    A-G Ratio 1.9 08/21/2019 10:28 AM    ALT (SGPT) 27 08/21/2019 10:28 AM             FOLLOW-UP            Patient was made aware and verbalized understanding that an appointment will be scheduled for them for a virtual visit and/or office visit within the above time frame. Patient understanding his/her responsibility to call and change time/date if he/she so chooses. Thank you, Edilma Diallo MD for allowing me to participate in the care of aYhaira Palma. Please do not hesitate to contact me for further questions/concerns. Greater than 20 minutes was spent in direct video patient care, planning and chart review. This visit was conducted using [employer] telemedicine services.        Lizet Plata MD    [address]        [phone] / [phone] Fax       [facility], [address] [address]  [place]  [phone] / [phone] Fax

## 2024-02-29 NOTE — PROGRESS NOTES
HPI  Ms. Gt Ortiz is a 77y.o. year old female, she is seen today for follow up HTN, high cholesterol, IFG, AWV. Seen in ED for high bp, dizziness on 8/17/19 - head CT neg, trop neg, cxr neg, lytes okay, cbc wnl, ekg nsr with nonspecific st and t wave abnormality. Has had dizziness x years - but would only last about a day but now lasting almost a week since ED visit. Had chest pressure and in neck at time of high BP - better when bp lower. Was as high as 305 systolic at home. Sometimes feels off balance at home. Notes dizziness when moving. Also hearing aids x years and now will have roaring in ears at times. During spells of dizziness felt like eyes rolling in head and ears plugged. Has eye exam every 4 mos. No focal weakness during spells. Ankles are swollen some since being on higher dose of amlodipine. BP at home 118-148/57-89. No chest pain or sob. Still walking regularly most days unless too hot.      gerd stable on current regimen - not worse. Chief Complaint   Patient presents with    Blood Pressure Check     Room 2B// fasting     Cholesterol Problem    Blood sugar problem    Dizziness     [facility] ER on 8/17        Prior to Admission medications    Medication Sig Start Date End Date Taking? Authorizing Provider   amLODIPine (NORVASC) 5 mg tablet Take 2 Tabs by mouth daily. 8/9/19  Yes Sterling Wilcox MD   famotidine (PEPCID) 20 mg tablet Take 1 Tab by mouth two (2) times a day. 8/9/19  Yes Sterling Wilcox MD   losartan (COZAAR) 25 mg tablet Take 1 Tab by mouth daily. 8/9/19  Yes Sterling Wilcox MD   rosuvastatin (CRESTOR) 20 mg tablet Take 1 Tab by mouth nightly. 8/9/19  Yes Sterling Wilcox MD   clopidogrel (PLAVIX) 75 mg tab Take 1 Tab by mouth daily. 8/9/19  Yes Dhiraj Vásquez MD   ezetimibe (ZETIA) 10 mg tablet Take 1 Tab by mouth daily. 8/9/19  Yes Dhiraj Vásquez MD   carvedilol (COREG) 6.25 mg tablet Take 1 Tab by mouth two (2) times a day.  8/9/19 Yes Pooja Garcias MD   nicotinic acid (NIACIN) 500 mg tablet Take 500 mg by mouth Daily (before breakfast). Yes Provider, Historical   cholecalciferol, vitamin D3, (VITAMIN D3) 2,000 unit tab Take 1 Tab by mouth daily. Yes Provider, Historical   aspirin 81 mg tablet Take 81 mg by mouth daily. Yes Provider, Historical         Allergies   Allergen Reactions    Lisinopril Other (comments)     High K         REVIEW OF SYSTEMS:  Per HPI    PHYSICAL EXAM:  Visit Vitals  /60   Pulse 61   Temp 98.1 °F (36.7 °C) (Oral)   Resp 14   Ht 5' 4\" (1.626 m)   Wt 121 lb 3.2 oz (55 kg)   SpO2 98%   BMI 20.80 kg/m²     Constitutional: Appears well-developed and well-nourished. No distress. HENT:   Head: Normocephalic and atraumatic. Eyes: No scleral icterus. PERRL, EOMI  Ears: small amount cerumen b/l tm's  Mouth: OP clear without lesions, no pharyngeal exudate  Neck: no lad, no tm, supple, no bruits  Cardiovascular: Normal S1/S2, regular rhythm. No murmurs, rubs, or gallops. Pulmonary/Chest: Effort normal and breath sounds normal. No respiratory distress. No wheezes, rhonchi, or rales. Ext: trace b/l pedal edema. Neurological: Alert. Strength 5/5 b/l UE and LE, CN II-XII intact, cerebellum intact to rapid alt hand movements  Psychiatric: Normal mood and affect. Behavior is normal.     Lab Results   Component Value Date/Time    Sodium 139 08/17/2019 09:22 PM    Potassium 4.0 08/17/2019 09:22 PM    Chloride 107 08/17/2019 09:22 PM    CO2 22 08/17/2019 09:22 PM    Anion gap 10 08/17/2019 09:22 PM    Glucose 135 (H) 08/17/2019 09:22 PM    BUN 31 (H) 08/17/2019 09:22 PM    Creatinine 1.14 (H) 08/17/2019 09:22 PM    BUN/Creatinine ratio 27 (H) 08/17/2019 09:22 PM    GFR est AA 56 (L) 08/17/2019 09:22 PM    GFR est non-AA 46 (L) 08/17/2019 09:22 PM    Calcium 10.0 08/17/2019 09:22 PM    Bilirubin, total 0.6 08/17/2019 09:22 PM    AST (SGOT) 30 08/17/2019 09:22 PM    Alk.  phosphatase 139 (H) 08/17/2019 09:22 PM    Protein, total 7.8 08/17/2019 09:22 PM    Albumin 4.4 08/17/2019 09:22 PM    Globulin 3.4 08/17/2019 09:22 PM    A-G Ratio 1.3 08/17/2019 09:22 PM    ALT (SGPT) 42 08/17/2019 09:22 PM     Lab Results   Component Value Date/Time    Hemoglobin A1c 6.0 (H) 02/12/2019 10:36 AM    Hemoglobin A1c 5.8 (H) 08/10/2018 12:12 PM    Hemoglobin A1c 5.7 (H) 04/13/2018 09:24 AM      Lab Results   Component Value Date/Time    Cholesterol, total 191 02/12/2019 10:36 AM    HDL Cholesterol 78 02/12/2019 10:36 AM    LDL, calculated 98 02/12/2019 10:36 AM    VLDL, calculated 15 02/12/2019 10:36 AM    Triglyceride 74 02/12/2019 10:36 AM    CHOL/HDL Ratio 3.1 09/28/2011 05:00 AM          ASSESSMENT/PLAN  Diagnoses and all orders for this visit:    1. Medicare annual wellness visit, subsequent    2. Dizziness  -     REFERRAL TO ENT-OTOLARYNGOLOGY  Suspect inner ear issue - chronic - refer to ENT  3. Essential hypertension  -     METABOLIC PANEL, COMPREHENSIVE  -     MAGNESIUM  Slightly high today - followed by Dr. Evelyne Kerr - monitor at home - checked meter in office today and home meter close to value here  4. Mixed hyperlipidemia  -     LIPID PANEL  On statin, zetia  5. IFG (impaired fasting glucose)  -     HEMOGLOBIN A1C WITH EAG    6. Advanced directives, counseling/discussion    7. Coronary artery disease involving native coronary artery of native heart without angina pectoris  Followed by Dr. Evelyne Kerr  8. Bilateral hearing loss, unspecified hearing loss type  -     REFERRAL TO ENT-OTOLARYNGOLOGY          There are no preventive care reminders to display for this patient. Follow-up and Dispositions    · Return in about 6 months (around 2/21/2020) for bp, chol, gerd. Reviewed plan of care. Patient has provided input and agrees with goals. The nurse provided the patient and/or family with advanced directive information if needed and encouraged the patient to provide a copy to the office when available. This is the Subsequent Medicare Annual Wellness Exam, performed 12 months or more after the Initial AWV or the last Subsequent AWV    I have reviewed the patient's medical history in detail and updated the computerized patient record. History     Past Medical History:   Diagnosis Date    CAD (coronary artery disease)     CKD (chronic kidney disease), stage III ([facility] [address] 9/26/2014    Followed by Dr. 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 Soni Asher HX TONSILLECTOMY       Current Outpatient Medications   Medication Sig Dispense Refill    amLODIPine (NORVASC) 5 mg tablet Take 2 Tabs by mouth daily. 180 Tab 3    famotidine (PEPCID) 20 mg tablet Take 1 Tab by mouth two (2) times a day. 180 Tab 3    losartan (COZAAR) 25 mg tablet Take 1 Tab by mouth daily. 90 Tab 3    rosuvastatin (CRESTOR) 20 mg tablet Take 1 Tab by mouth nightly. 90 Tab 3    clopidogrel (PLAVIX) 75 mg tab Take 1 Tab by mouth daily. 90 Tab 3    ezetimibe (ZETIA) 10 mg tablet Take 1 Tab by mouth daily. 90 Tab 3    carvedilol (COREG) 6.25 mg tablet Take 1 Tab by mouth two (2) times a day. 180 Tab 3    nicotinic acid (NIACIN) 500 mg tablet Take 500 mg by mouth Daily (before breakfast).  cholecalciferol, vitamin D3, (VITAMIN D3) 2,000 unit tab Take 1 Tab by mouth daily.  aspirin 81 mg tablet Take 81 mg by mouth daily.        Allergies   Allergen Reactions    Lisinopril Other (comments)     High K     Family History   Problem Relation Age of Onset    Hypertension Mother     Diabetes Maternal Grandmother     Diabetes Paternal Grandfather      Social History     Tobacco Use    Smoking status: Never Smoker    Smokeless tobacco: Never Used   Substance Use Topics    Alcohol use: No     Patient Active Problem List   Diagnosis Code    HTN (hypertension) I10    Allergic rhinitis J30.9    CAD (coronary artery disease) I25.10    PAC (premature atrial contraction) I49.1    Esophageal reflux K21.9    Other and unspecified hyperlipidemia E78.5    GERD (gastroesophageal reflux disease) K21.9    PVC (premature ventricular contraction) I49.3    Carotid artery stenosis I65.29    IFG (impaired fasting glucose) R73.01    Heart palpitations R00.2    Hyperlipidemia E78.5    Coronary artery disease involving native coronary artery of native heart without angina pectoris I25.10       Depression Risk Factor Screening:     3 most recent PHQ Screens 2/12/2019   Little interest or pleasure in doing things Not at all   Feeling down, depressed, irritable, or hopeless Not at all   Total Score PHQ 2 0     Alcohol Risk Factor Screening: You do not drink alcohol or very rarely. Functional Ability and Level of Safety:   Hearing Loss  The patient wears hearing aids. Activities of Daily Living  The home contains: no safety equipment. Patient does total self care    Fall Risk  Fall Risk Assessment, last 12 mths 8/21/2019   Able to walk? Yes   Fall in past 12 months? No   Fall with injury? -   Number of falls in past 12 months -   Fall Risk Score -       Abuse Screen  Patient is not abused    Cognitive Screening   Evaluation of Cognitive Function:  Has your family/caregiver stated any concerns about your memory: no  Normal    Patient Care Team   Patient Care Team:  Christine Morton MD as PCP - General  Smith Baeza MD (Cardiology)  Javy Covarrubias OD (Optometry)    Assessment/Plan   Education and counseling provided:  Are appropriate based on today's review and evaluation    Diagnoses and all orders for this visit:    1. Medicare annual wellness visit, subsequent    2. Dizziness  -     REFERRAL TO ENT-OTOLARYNGOLOGY    3. Essential hypertension  -     METABOLIC PANEL, COMPREHENSIVE  -     MAGNESIUM    4. Mixed hyperlipidemia  -     LIPID PANEL    5. IFG (impaired fasting glucose)  -     HEMOGLOBIN A1C WITH EAG    6. Advanced directives, counseling/discussion    7. Coronary artery disease involving native coronary artery of native heart without angina pectoris    8. Bilateral hearing loss, unspecified hearing loss type  -     REFERRAL TO ENT-OTOLARYNGOLOGY        There are no preventive care reminders to display for this patient. 19

## 2024-04-01 ENCOUNTER — APPOINTMENT (OUTPATIENT)
Facility: HOSPITAL | Age: 84
End: 2024-04-01
Payer: MEDICARE

## 2024-04-01 ENCOUNTER — OFFICE VISIT (OUTPATIENT)
Age: 84
End: 2024-04-01

## 2024-04-01 ENCOUNTER — HOSPITAL ENCOUNTER (EMERGENCY)
Facility: HOSPITAL | Age: 84
Discharge: HOME | End: 2024-04-02
Attending: OBSTETRICS & GYNECOLOGY
Payer: MEDICARE

## 2024-04-01 VITALS
TEMPERATURE: 99.1 F | BODY MASS INDEX: 20.62 KG/M2 | WEIGHT: 120.1 LBS | OXYGEN SATURATION: 98 % | SYSTOLIC BLOOD PRESSURE: 138 MMHG | HEART RATE: 83 BPM | RESPIRATION RATE: 18 BRPM | DIASTOLIC BLOOD PRESSURE: 78 MMHG

## 2024-04-01 DIAGNOSIS — R05.1 ACUTE COUGH: ICD-10-CM

## 2024-04-01 DIAGNOSIS — J20.9 ACUTE BRONCHITIS, UNSPECIFIED ORGANISM: ICD-10-CM

## 2024-04-01 DIAGNOSIS — J18.9 COMMUNITY ACQUIRED PNEUMONIA OF LEFT LOWER LOBE OF LUNG: Primary | ICD-10-CM

## 2024-04-01 DIAGNOSIS — J18.9 PNEUMONIA OF LEFT LOWER LOBE DUE TO INFECTIOUS ORGANISM: Primary | ICD-10-CM

## 2024-04-01 LAB
ALBUMIN SERPL-MCNC: 3.3 G/DL (ref 3.5–5)
ALBUMIN/GLOB SERPL: 0.7 (ref 1.1–2.2)
ALP SERPL-CCNC: 102 U/L (ref 45–117)
ALT SERPL-CCNC: 32 U/L (ref 12–78)
ANION GAP SERPL CALC-SCNC: 7 MMOL/L (ref 5–15)
AST SERPL-CCNC: 32 U/L (ref 15–37)
BASOPHILS # BLD: 0 K/UL (ref 0–0.1)
BASOPHILS NFR BLD: 0 % (ref 0–1)
BILIRUB SERPL-MCNC: 0.9 MG/DL (ref 0.2–1)
BUN SERPL-MCNC: 27 MG/DL (ref 6–20)
BUN/CREAT SERPL: 20 (ref 12–20)
CALCIUM SERPL-MCNC: 9.6 MG/DL (ref 8.5–10.1)
CHLORIDE SERPL-SCNC: 105 MMOL/L (ref 97–108)
CO2 SERPL-SCNC: 22 MMOL/L (ref 21–32)
COMMENT:: NORMAL
CREAT SERPL-MCNC: 1.32 MG/DL (ref 0.55–1.02)
DIFFERENTIAL METHOD BLD: ABNORMAL
EOSINOPHIL # BLD: 0 K/UL (ref 0–0.4)
EOSINOPHIL NFR BLD: 0 % (ref 0–7)
ERYTHROCYTE [DISTWIDTH] IN BLOOD BY AUTOMATED COUNT: 13.3 % (ref 11.5–14.5)
GLOBULIN SER CALC-MCNC: 4.6 G/DL (ref 2–4)
GLUCOSE SERPL-MCNC: 125 MG/DL (ref 65–100)
HCT VFR BLD AUTO: 39.1 % (ref 35–47)
HGB BLD-MCNC: 12.9 G/DL (ref 11.5–16)
IMM GRANULOCYTES # BLD AUTO: 0.1 K/UL (ref 0–0.04)
IMM GRANULOCYTES NFR BLD AUTO: 0 % (ref 0–0.5)
LACTATE SERPL-SCNC: 1.9 MMOL/L (ref 0.4–2)
LYMPHOCYTES # BLD: 1.3 K/UL (ref 0.8–3.5)
LYMPHOCYTES NFR BLD: 11 % (ref 12–49)
MCH RBC QN AUTO: 28.9 PG (ref 26–34)
MCHC RBC AUTO-ENTMCNC: 33 G/DL (ref 30–36.5)
MCV RBC AUTO: 87.7 FL (ref 80–99)
MONOCYTES # BLD: 1.1 K/UL (ref 0–1)
MONOCYTES NFR BLD: 10 % (ref 5–13)
NEUTS SEG # BLD: 9.2 K/UL (ref 1.8–8)
NEUTS SEG NFR BLD: 79 % (ref 32–75)
NRBC # BLD: 0 K/UL (ref 0–0.01)
NRBC BLD-RTO: 0 PER 100 WBC
PLATELET # BLD AUTO: 228 K/UL (ref 150–400)
PMV BLD AUTO: 10 FL (ref 8.9–12.9)
POTASSIUM SERPL-SCNC: 4 MMOL/L (ref 3.5–5.1)
PROT SERPL-MCNC: 7.9 G/DL (ref 6.4–8.2)
RBC # BLD AUTO: 4.46 M/UL (ref 3.8–5.2)
SODIUM SERPL-SCNC: 134 MMOL/L (ref 136–145)
SPECIMEN HOLD: NORMAL
WBC # BLD AUTO: 11.7 K/UL (ref 3.6–11)

## 2024-04-01 PROCEDURE — 96360 HYDRATION IV INFUSION INIT: CPT

## 2024-04-01 PROCEDURE — 85025 COMPLETE CBC W/AUTO DIFF WBC: CPT

## 2024-04-01 PROCEDURE — 87040 BLOOD CULTURE FOR BACTERIA: CPT

## 2024-04-01 PROCEDURE — 96365 THER/PROPH/DIAG IV INF INIT: CPT

## 2024-04-01 PROCEDURE — 2580000003 HC RX 258: Performed by: EMERGENCY MEDICINE

## 2024-04-01 PROCEDURE — 6370000000 HC RX 637 (ALT 250 FOR IP): Performed by: STUDENT IN AN ORGANIZED HEALTH CARE EDUCATION/TRAINING PROGRAM

## 2024-04-01 PROCEDURE — 80053 COMPREHEN METABOLIC PANEL: CPT

## 2024-04-01 PROCEDURE — 36415 COLL VENOUS BLD VENIPUNCTURE: CPT

## 2024-04-01 PROCEDURE — 99284 EMERGENCY DEPT VISIT MOD MDM: CPT

## 2024-04-01 PROCEDURE — 71250 CT THORAX DX C-: CPT

## 2024-04-01 PROCEDURE — 96366 THER/PROPH/DIAG IV INF ADDON: CPT

## 2024-04-01 PROCEDURE — 96361 HYDRATE IV INFUSION ADD-ON: CPT

## 2024-04-01 PROCEDURE — 2580000003 HC RX 258: Performed by: STUDENT IN AN ORGANIZED HEALTH CARE EDUCATION/TRAINING PROGRAM

## 2024-04-01 PROCEDURE — 2500000003 HC RX 250 WO HCPCS: Performed by: STUDENT IN AN ORGANIZED HEALTH CARE EDUCATION/TRAINING PROGRAM

## 2024-04-01 PROCEDURE — 6360000002 HC RX W HCPCS: Performed by: STUDENT IN AN ORGANIZED HEALTH CARE EDUCATION/TRAINING PROGRAM

## 2024-04-01 PROCEDURE — 83605 ASSAY OF LACTIC ACID: CPT

## 2024-04-01 RX ORDER — ALBUTEROL SULFATE 90 UG/1
2 AEROSOL, METERED RESPIRATORY (INHALATION) 4 TIMES DAILY PRN
Qty: 18 G | Refills: 0 | Status: SHIPPED | OUTPATIENT
Start: 2024-04-01

## 2024-04-01 RX ORDER — 0.9 % SODIUM CHLORIDE 0.9 %
1000 INTRAVENOUS SOLUTION INTRAVENOUS ONCE
Status: COMPLETED | OUTPATIENT
Start: 2024-04-01 | End: 2024-04-02

## 2024-04-01 RX ORDER — DOXYCYCLINE HYCLATE 100 MG
100 TABLET ORAL 2 TIMES DAILY
Qty: 20 TABLET | Refills: 0 | Status: SHIPPED | OUTPATIENT
Start: 2024-04-01 | End: 2024-04-11

## 2024-04-01 RX ADMIN — SODIUM CHLORIDE 100 MG: 900 INJECTION INTRAVENOUS at 20:13

## 2024-04-01 RX ADMIN — SODIUM CHLORIDE 1000 ML: 9 INJECTION, SOLUTION INTRAVENOUS at 19:32

## 2024-04-01 RX ADMIN — ALBUTEROL SULFATE 1 DOSE: 2.5 SOLUTION RESPIRATORY (INHALATION) at 20:17

## 2024-04-01 ASSESSMENT — ENCOUNTER SYMPTOMS: COUGH: 1

## 2024-04-01 ASSESSMENT — PAIN - FUNCTIONAL ASSESSMENT: PAIN_FUNCTIONAL_ASSESSMENT: NONE - DENIES PAIN

## 2024-04-01 NOTE — PATIENT INSTRUCTIONS
Xray Result (most recent):  XR CHEST STANDARD TWO VW 04/01/2024    Narrative  EXAM: XR CHEST (2 VW)    INDICATION:  Acute cough    COMPARISON: 1/4/2022    TECHNIQUE: PA and lateral chest views    FINDINGS: Atherosclerotic calcifications and mild tortuosity of the thoracic  aorta are again shown with otherwise normal mediastinal and hilar contour. The  cardiac size is within normal limits. The pulmonary vasculature is within normal  limits.    Patchy densities are noted posteriorly at the left pulmonary base concerning for  pneumonia. Trace left pleural fluid is demonstrated.    The visualized bones and upper abdomen are age-appropriate.    Impression  Posterior left basilar consolidation and trace left pleural effusion.    Your xray today does suggest pneumonia with pleural effusion  PORT score does recommend possible hospitalization due to your age and risk factors (kidney disease, pleural effusion)  I would like for you to go to the ED for further evaluation (blood work/imaging) and possible hospitalization

## 2024-04-01 NOTE — PROGRESS NOTES
Micki Muñoz (:  1940) is a 83 y.o. female,Established patient, here for evaluation of the following chief complaint(s):  Cough (Symptoms started in march. Cough is persistent. )      ASSESSMENT/PLAN:  Visit Diagnoses and Associated Orders       Community acquired pneumonia of left lower lobe of lung    -  Primary         Acute cough        XR CHEST PA/LAT [77440 CPT(R)]   - Future Order         Acute bronchitis, unspecified organism                     Your xray today does suggest pneumonia with pleural effusion  PORT score does recommend possible hospitalization due to your age and risk factors (kidney disease, pleural effusion)  I would like for you to go to the ED for further evaluation (blood work/imaging) and possible hospitalization    SUBJECTIVE/OBJECTIVE:    Cough         83 y.o. female presents with symptoms of cough, chest congestion and chest tightness which has affected her for about 2-3 weeks. She states that she initially had some cold symptoms which had consisted of sore throat, nasal congestion, and cough. These other symptoms have slowly improved but her cough and chest tightness has worsened. She denies fevers, but does have a slightly elevated temperature in clinic today of 99.1. Reports some general fatigue and malaise. She denies any significant chills or body aches. She denies ear pain, nasal congestion or sore throat. Reports cough is productive of green sputum. Reports some tightness in her chest with deep breathing. Denies any wheezing. Denies any history of asthma or COPD. No chest or abdominal pain, no nausea, vomiting or diarrhea. Not taking anything currently for her symptoms.         Vitals:    24 0932   BP: 138/78   Site: Left Upper Arm   Position: Sitting   Cuff Size: Small Adult   Pulse: 83   Resp: 18   Temp: 99.1 °F (37.3 °C)   SpO2: 98%   Weight: 54.5 kg (120 lb 1.6 oz)       Xray Result (most recent):  XR CHEST STANDARD TWO VW 2024    Narrative  EXAM: XR

## 2024-04-01 NOTE — ED TRIAGE NOTES
Pt arrives ambulatory from urgent care \"for further testing\" for pneumonia  Pt went to urgent care for symptoms of cold and cough, coughing up phlegm, SOB    Sandy NP in triage

## 2024-04-02 VITALS
WEIGHT: 123.9 LBS | OXYGEN SATURATION: 94 % | DIASTOLIC BLOOD PRESSURE: 69 MMHG | HEIGHT: 63 IN | HEART RATE: 92 BPM | BODY MASS INDEX: 21.95 KG/M2 | SYSTOLIC BLOOD PRESSURE: 159 MMHG | TEMPERATURE: 98.7 F | RESPIRATION RATE: 18 BRPM

## 2024-04-02 NOTE — ED PROVIDER NOTES
[facility] EMERGENCY DEP  EMERGENCY DEPARTMENT ENCOUNTER      Pt Name: Micki Muñoz  MRN: [id number]  Birthdate 1940  Date of evaluation: 4/1/2024  Provider: David Rae MD    CHIEF COMPLAINT       Chief Complaint   Patient presents with    Cough       HISTORY OF PRESENT ILLNESS    HPI    83-year-old female with CAD, CKD, hypertension, hyperlipidemia, here for cough, shortness of breath.  Symptoms for the past 10 days but seem to have worsened over the past 3-4.  Went to urgent care today, diagnosed with pneumonia, given her significant symptoms and was sent to the emergency room for further care.  She currently reports feeling winded with walking, having a persistent mainly nonproductive cough.  She denies chest pain, leg pain or leg swelling, no hemoptysis.    Nursing notes reviewed.    REVIEW OF SYSTEMS     Review of Systems  Unless otherwise stated, a complete review of systems was asked of the patient. Pertinent positives are noted in the HPI section.    PAST MEDICAL HISTORY     Past Medical History:   Diagnosis Date    Acute respiratory failure with hypoxia (HCC) 1/4/2022    CAD (coronary artery disease)     CKD (chronic kidney disease), stage III (HCC) 9/26/2014    Followed by Dr. 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 Jackson    SD UNLISTED PROCEDURE CARDIAC SURGERY      Cardiac Cath March 2011    TONSILLECTOMY         CURRENT MEDICATIONS       Discharge Medication List as of 4/1/2024 10:48 PM        CONTINUE these medications which have NOT CHANGED    Details   carvedilol (COREG) 12.5 MG tablet TAKE 1 TABLET TWICE DAILY WITH MEALS,

## 2024-04-02 NOTE — DISCHARGE INSTRUCTIONS
You have a pneumonia or bacterial infection in your left lung.  Take the prescribed antibiotic doxycycline and use the prescribed albuterol inhaler as needed for wheezing.  I also recommend obtaining a pulse oximeter at any over-the-counter pharmacy and measure your oxygen level at least twice daily.  If you see levels below 90% return to the emergency department further care.  Additionally if you have any changing or worsening symptoms come back to the ER for reevaluation.

## 2024-04-03 ENCOUNTER — TELEPHONE (OUTPATIENT)
Facility: CLINIC | Age: 84
End: 2024-04-03

## 2024-04-03 NOTE — TELEPHONE ENCOUNTER
Pt went to ER on 4/1, feels better but still weak and coughing. Will check with provider to see if she needs to be seen for follow up.

## 2024-04-03 NOTE — TELEPHONE ENCOUNTER
Pt wants to let provider know that she went to ed for pneumonia and wants to know if she should fu with provider, nothing available

## 2024-04-06 LAB
BACTERIA SPEC CULT: NORMAL
BACTERIA SPEC CULT: NORMAL
SERVICE CMNT-IMP: NORMAL
SERVICE CMNT-IMP: NORMAL

## 2024-04-11 RX ORDER — FAMOTIDINE 20 MG/1
TABLET, FILM COATED ORAL
Qty: 180 TABLET | Refills: 3 | Status: SHIPPED | OUTPATIENT
Start: 2024-04-11

## 2024-04-11 NOTE — TELEPHONE ENCOUNTER
PCP: Karyn Guillermo MD     Last appt:  10/26/2023      Future Appointments   Date Time Provider Department Center   4/26/2024  9:00 AM Karyn Guillermo MD [facility] AMB          Requested Prescriptions     Pending Prescriptions Disp Refills    famotidine (PEPCID) 20 MG tablet [Pharmacy Med Name: FAMOTIDINE 20 MG Tablet] 180 tablet 3     Sig: TAKE 1 TABLET TWICE DAILY

## 2024-04-22 DIAGNOSIS — R73.01 IFG (IMPAIRED FASTING GLUCOSE): ICD-10-CM

## 2024-04-22 DIAGNOSIS — N18.31 STAGE 3A CHRONIC KIDNEY DISEASE (HCC): ICD-10-CM

## 2024-04-22 DIAGNOSIS — E78.2 MIXED HYPERLIPIDEMIA: ICD-10-CM

## 2024-04-23 LAB
ALBUMIN SERPL-MCNC: 3.8 G/DL (ref 3.5–5)
ALBUMIN/GLOB SERPL: 1.3 (ref 1.1–2.2)
ALP SERPL-CCNC: 99 U/L (ref 45–117)
ALT SERPL-CCNC: 27 U/L (ref 12–78)
ANION GAP SERPL CALC-SCNC: 3 MMOL/L (ref 5–15)
AST SERPL-CCNC: 31 U/L (ref 15–37)
BILIRUB SERPL-MCNC: 0.7 MG/DL (ref 0.2–1)
BUN SERPL-MCNC: 27 MG/DL (ref 6–20)
BUN/CREAT SERPL: 20 (ref 12–20)
CALCIUM SERPL-MCNC: 9.1 MG/DL (ref 8.5–10.1)
CHLORIDE SERPL-SCNC: 110 MMOL/L (ref 97–108)
CHOLEST SERPL-MCNC: 173 MG/DL
CO2 SERPL-SCNC: 27 MMOL/L (ref 21–32)
CREAT SERPL-MCNC: 1.32 MG/DL (ref 0.55–1.02)
EST. AVERAGE GLUCOSE BLD GHB EST-MCNC: 126 MG/DL
GLOBULIN SER CALC-MCNC: 3 G/DL (ref 2–4)
GLUCOSE SERPL-MCNC: 112 MG/DL (ref 65–100)
HBA1C MFR BLD: 6 % (ref 4–5.6)
HDLC SERPL-MCNC: 75 MG/DL
HDLC SERPL: 2.3 (ref 0–5)
LDLC SERPL CALC-MCNC: 81.2 MG/DL (ref 0–100)
POTASSIUM SERPL-SCNC: 4.9 MMOL/L (ref 3.5–5.1)
PROT SERPL-MCNC: 6.8 G/DL (ref 6.4–8.2)
SODIUM SERPL-SCNC: 140 MMOL/L (ref 136–145)
TRIGL SERPL-MCNC: 84 MG/DL
VLDLC SERPL CALC-MCNC: 16.8 MG/DL

## 2024-04-26 ENCOUNTER — OFFICE VISIT (OUTPATIENT)
Facility: CLINIC | Age: 84
End: 2024-04-26
Payer: MEDICARE

## 2024-04-26 VITALS
WEIGHT: 116 LBS | OXYGEN SATURATION: 98 % | HEIGHT: 63 IN | RESPIRATION RATE: 12 BRPM | HEART RATE: 68 BPM | DIASTOLIC BLOOD PRESSURE: 56 MMHG | TEMPERATURE: 97.9 F | BODY MASS INDEX: 20.55 KG/M2 | SYSTOLIC BLOOD PRESSURE: 122 MMHG

## 2024-04-26 DIAGNOSIS — K21.9 GASTROESOPHAGEAL REFLUX DISEASE, UNSPECIFIED WHETHER ESOPHAGITIS PRESENT: ICD-10-CM

## 2024-04-26 DIAGNOSIS — I49.1 PAC (PREMATURE ATRIAL CONTRACTION): ICD-10-CM

## 2024-04-26 DIAGNOSIS — Z00.00 MEDICARE ANNUAL WELLNESS VISIT, SUBSEQUENT: Primary | ICD-10-CM

## 2024-04-26 DIAGNOSIS — E78.2 MIXED HYPERLIPIDEMIA: ICD-10-CM

## 2024-04-26 DIAGNOSIS — I10 PRIMARY HYPERTENSION: ICD-10-CM

## 2024-04-26 DIAGNOSIS — R73.01 IFG (IMPAIRED FASTING GLUCOSE): ICD-10-CM

## 2024-04-26 DIAGNOSIS — I49.3 PVC (PREMATURE VENTRICULAR CONTRACTION): ICD-10-CM

## 2024-04-26 DIAGNOSIS — J18.9 PNEUMONIA OF LEFT LOWER LOBE DUE TO INFECTIOUS ORGANISM: ICD-10-CM

## 2024-04-26 DIAGNOSIS — N18.31 STAGE 3A CHRONIC KIDNEY DISEASE (HCC): ICD-10-CM

## 2024-04-26 PROCEDURE — 3074F SYST BP LT 130 MM HG: CPT | Performed by: INTERNAL MEDICINE

## 2024-04-26 PROCEDURE — 3078F DIAST BP <80 MM HG: CPT | Performed by: INTERNAL MEDICINE

## 2024-04-26 PROCEDURE — 1090F PRES/ABSN URINE INCON ASSESS: CPT | Performed by: INTERNAL MEDICINE

## 2024-04-26 PROCEDURE — 1036F TOBACCO NON-USER: CPT | Performed by: INTERNAL MEDICINE

## 2024-04-26 PROCEDURE — G8400 PT W/DXA NO RESULTS DOC: HCPCS | Performed by: INTERNAL MEDICINE

## 2024-04-26 PROCEDURE — G8420 CALC BMI NORM PARAMETERS: HCPCS | Performed by: INTERNAL MEDICINE

## 2024-04-26 PROCEDURE — 1123F ACP DISCUSS/DSCN MKR DOCD: CPT | Performed by: INTERNAL MEDICINE

## 2024-04-26 PROCEDURE — 99214 OFFICE O/P EST MOD 30 MIN: CPT | Performed by: INTERNAL MEDICINE

## 2024-04-26 PROCEDURE — G0439 PPPS, SUBSEQ VISIT: HCPCS | Performed by: INTERNAL MEDICINE

## 2024-04-26 PROCEDURE — G8427 DOCREV CUR MEDS BY ELIG CLIN: HCPCS | Performed by: INTERNAL MEDICINE

## 2024-04-26 ASSESSMENT — PATIENT HEALTH QUESTIONNAIRE - PHQ9
SUM OF ALL RESPONSES TO PHQ QUESTIONS 1-9: 0
1. LITTLE INTEREST OR PLEASURE IN DOING THINGS: NOT AT ALL
SUM OF ALL RESPONSES TO PHQ QUESTIONS 1-9: 0
SUM OF ALL RESPONSES TO PHQ9 QUESTIONS 1 & 2: 0
2. FEELING DOWN, DEPRESSED OR HOPELESS: NOT AT ALL

## 2024-04-26 ASSESSMENT — LIFESTYLE VARIABLES
HOW OFTEN DO YOU HAVE A DRINK CONTAINING ALCOHOL: NEVER
HOW MANY STANDARD DRINKS CONTAINING ALCOHOL DO YOU HAVE ON A TYPICAL DAY: PATIENT DOES NOT DRINK

## 2024-04-26 NOTE — PATIENT INSTRUCTIONS
stop and talk to your doctor.  Where can you learn more?  Go to https://www.PlayerPro.net/patientEd and enter P600 to learn more about \"Learning About Being Active as an Older Adult.\"  Current as of: June 5, 2023               Content Version: 14.0  © 4877-3716 [employer].   Care instructions adapted under license by [employer]. If you have questions about a medical condition or this instruction, always ask your healthcare professional. [employer] disclaims any warranty or liability for your use of this information.           Advance Directives: Care Instructions  Overview  An advance directive is a legal way to state your wishes at the end of your life. It tells your family and your doctor what to do if you can't say what you want.  There are two main types of advance directives. You can change them any time your wishes change.  Living will.  This form tells your family and your doctor your wishes about life support and other treatment. The form is also called a declaration.  Medical power of .  This form lets you name a person to make treatment decisions for you when you can't speak for yourself. This person is called a health care agent (health care proxy, health care surrogate). The form is also called a durable power of  for health care.  If you do not have an advance directive, decisions about your medical care may be made by a family member, or by a doctor or a  who doesn't know you.  It may help to think of an advance directive as a gift to the people who care for you. If you have one, they won't have to make tough decisions by themselves.  For more information, including forms for your state, see the [employer] website (www.caringinfo.org/planning/advance-directives/).  Follow-up care is a key part of your treatment and safety. Be sure to make and go to all appointments, and call your doctor if you are having problems. It's also a good idea to know your

## 2024-04-26 NOTE — PROGRESS NOTES
Medicare Annual Wellness Visit    Micki Muñoz is here for Medicare AWV, Hypertension, Blood Sugar Problem, and Cholesterol Problem    Assessment & Plan   Medicare annual wellness visit, subsequent  Stage 3a chronic kidney disease (HCC)  Primary hypertension  Gastroesophageal reflux disease, unspecified whether esophagitis present  IFG (impaired fasting glucose)  Pneumonia of left lower lobe due to infectious organism  -     XR CHEST (2 VIEWS); Future  Mixed hyperlipidemia  PAC (premature atrial contraction)  PVC (premature ventricular contraction)    Clinically improved from a pneumonia standpoint.  Get repeat chest x-ray in the next few weeks.  BP at goal, continue current regimen and follow-up with cardiology as planned.  CKD stable, check every 6 months.  A1c stable, check yearly.  GERD controlled, continue famotidine.  Lipids improved, continue statin and zetia.  PACs and PVCs stable, managed by cardiology.    Recommendations for Preventive Services Due: see orders and patient instructions/AVS.  Recommended screening schedule for the next 5-10 years is provided to the patient in written form: see Patient Instructions/AVS.     Return in about 6 months (around 10/26/2024) for HTN, GERD.     Subjective   The following acute and/or chronic problems were also addressed today:  BP at home normally 120-130 systolic  No chest pain, no change in chronic sob, dizziness, weakness, lightheadedness.   Feels fatigued, at times feels \"thickness\" in her head - no clear triggers and no associated symptoms - feels better with rest  Notes she is cold all the time  Daughter with early dementia - has been living with her since July - she can't be left alone - lots of short term memory loss and has to be reminded constantly to do things - needs constant direction  Son also lives with her - helps with yard work, home maintenance  Son from from [place] coming to visit soon and will help with other issues ie selling daughter's home in

## 2024-04-26 NOTE — PROGRESS NOTES
Micki Muñoz  Identified pt with two pt identifiers(name and ).     Chief Complaint   Patient presents with    Medicare AWV    Hypertension    Blood Sugar Problem    Cholesterol Problem       Reviewed record In preparation for visit and have obtained necessary documentation.     1. Have you been to the ER, urgent care clinic or hospitalized since your last visit? No     2. Have you seen or consulted any other health care providers outside of the [facility] since your last visit? Include any pap smears or colon screening. No    Vitals reviewed with provider.    Health Maintenance reviewed:     Health Maintenance Due   Topic    DEXA (modify frequency per FRAX score)     Respiratory Syncytial Virus (RSV) Pregnant or age 60 yrs+ (1 - 1-dose 60+ series)    Annual Wellness Visit (Medicare Advantage)     Depression Screen           Wt Readings from Last 3 Encounters:   24 52.6 kg (116 lb)   24 56.2 kg (123 lb 14.4 oz)   24 54.5 kg (120 lb 1.6 oz)        Temp Readings from Last 3 Encounters:   24 97.9 °F (36.6 °C) (Oral)   24 98.7 °F (37.1 °C)   24 99.1 °F (37.3 °C)        BP Readings from Last 3 Encounters:   24 (!) 175/73   24 (!) 159/69   24 138/78        Pulse Readings from Last 3 Encounters:   24 68   24 92   24 83      [unfilled]       Learning Assessment:   :         2023     1:00 PM   [facility] AMB LEARNING ASSESSMENT   Primary Learner Patient   level of education DID NOT GRADUATE HIGH SCHOOL   Barriers Factors NONE   co-learner caregiver No   Primary Language ENGLISH   Learning Preference DEMONSTRATION   Answered By patient   Relationship to Learner SELF         Fall Risk Assessment:   :         2024     8:51 AM 2023    12:56 PM 2023     3:09 PM 2022     2:00 PM 2022     8:08 PM 2022     8:38 AM 2022     4:55 PM   Amb Fall Risk Assessment and TUG Test   Do you feel unsteady or are you worried

## 2024-06-10 ENCOUNTER — HOSPITAL ENCOUNTER (OUTPATIENT)
Facility: HOSPITAL | Age: 84
Discharge: HOME | End: 2024-06-13
Payer: MEDICARE

## 2024-06-10 DIAGNOSIS — J18.9 PNEUMONIA OF LEFT LOWER LOBE DUE TO INFECTIOUS ORGANISM: ICD-10-CM

## 2024-06-10 PROCEDURE — 71046 X-RAY EXAM CHEST 2 VIEWS: CPT

## 2024-07-12 ENCOUNTER — OFFICE VISIT (OUTPATIENT)
Facility: CLINIC | Age: 84
End: 2024-07-12
Payer: MEDICARE

## 2024-07-12 VITALS
HEIGHT: 63 IN | DIASTOLIC BLOOD PRESSURE: 60 MMHG | OXYGEN SATURATION: 98 % | RESPIRATION RATE: 12 BRPM | WEIGHT: 119 LBS | SYSTOLIC BLOOD PRESSURE: 130 MMHG | HEART RATE: 65 BPM | TEMPERATURE: 98.3 F | BODY MASS INDEX: 21.09 KG/M2

## 2024-07-12 DIAGNOSIS — I10 PRIMARY HYPERTENSION: ICD-10-CM

## 2024-07-12 DIAGNOSIS — R93.89 ABNORMAL CXR: Primary | ICD-10-CM

## 2024-07-12 PROCEDURE — 3075F SYST BP GE 130 - 139MM HG: CPT | Performed by: INTERNAL MEDICINE

## 2024-07-12 PROCEDURE — 1036F TOBACCO NON-USER: CPT | Performed by: INTERNAL MEDICINE

## 2024-07-12 PROCEDURE — 99213 OFFICE O/P EST LOW 20 MIN: CPT | Performed by: INTERNAL MEDICINE

## 2024-07-12 PROCEDURE — 1090F PRES/ABSN URINE INCON ASSESS: CPT | Performed by: INTERNAL MEDICINE

## 2024-07-12 PROCEDURE — 1123F ACP DISCUSS/DSCN MKR DOCD: CPT | Performed by: INTERNAL MEDICINE

## 2024-07-12 PROCEDURE — G8427 DOCREV CUR MEDS BY ELIG CLIN: HCPCS | Performed by: INTERNAL MEDICINE

## 2024-07-12 PROCEDURE — 3078F DIAST BP <80 MM HG: CPT | Performed by: INTERNAL MEDICINE

## 2024-07-12 PROCEDURE — G8420 CALC BMI NORM PARAMETERS: HCPCS | Performed by: INTERNAL MEDICINE

## 2024-07-12 PROCEDURE — G8400 PT W/DXA NO RESULTS DOC: HCPCS | Performed by: INTERNAL MEDICINE

## 2024-07-12 NOTE — PROGRESS NOTES
HPI  Ms. Micki Muñoz is a 83 y.o. year old female, she is seen today for pneumonia follow up.   Had repeat CXR on 6/10/24:  IMPRESSION:  1. Resolution of the left lower lobe pneumonia.     2. Interval development of small right pleural effusion. Follow-up is suggested.     Today:  Only occasional cough - much better.   shortness of breath is back to baseline  No PND or orthopnea  No edema.   Is back to her usual activities     Had to pay $80 for CXR - would like to wait on repeat CXR which I think is reasonable.       Chief Complaint   Patient presents with    followup from pneumonia        Prior to Admission medications    Medication Sig Start Date End Date Taking? Authorizing Provider   famotidine (PEPCID) 20 MG tablet TAKE 1 TABLET TWICE DAILY 4/11/24  Yes Karyn Guillermo MD   albuterol sulfate HFA (VENTOLIN HFA) 108 (90 Base) MCG/ACT inhaler Inhale 2 puffs into the lungs 4 times daily as needed for Wheezing 4/1/24  Yes David Rae MD   carvedilol (COREG) 12.5 MG tablet TAKE 1 TABLET TWICE DAILY WITH MEALS  Patient taking differently: Take 0.5 tablets by mouth with breakfast and with evening meal 11/9/23  Yes Karyn Guillermo MD   vitamin B-12 (CYANOCOBALAMIN) 1000 MCG tablet Take 1 tablet by mouth daily   Yes Provider, MD Casey   magnesium (MAGNESIUM-OXIDE) 250 MG TABS tablet Take 1 tablet by mouth daily   Yes Provider, MD Casey   rosuvastatin (CRESTOR) 20 MG tablet Take 1 tablet by mouth nightly 9/14/23  Yes Karyn Guillermo MD   vitamin C (ASCORBIC ACID) 500 MG tablet Take 1 tablet by mouth daily   Yes Provider, MD Casey   valsartan-hydroCHLOROthiazide (DIOVAN-HCT) 320-12.5 MG per tablet 0.5 tablets daily 5/23/23  Yes ProviderCasey MD   aspirin 81 MG EC tablet Take 1 tablet by mouth daily   Yes ProviderCasey MD   amLODIPine (NORVASC) 5 MG tablet Take 1 tablet by mouth daily 6/3/21  Yes Automatic Reconciliation, Ar   Cholecalciferol 50 MCG (2000 UT) TABS Take 1

## 2024-07-12 NOTE — PROGRESS NOTES
Micki Muñoz  Identified pt with two pt identifiers(name and ).     Chief Complaint   Patient presents with    followup from pneumonia       Reviewed record In preparation for visit and have obtained necessary documentation.     1. Have you been to the ER, urgent care clinic or hospitalized since your last visit? No     2. Have you seen or consulted any other health care providers outside of the [facility] since your last visit? Include any pap smears or colon screening. No    Vitals reviewed with provider.    Health Maintenance reviewed:     Health Maintenance Due   Topic    DEXA (modify frequency per FRAX score)     Respiratory Syncytial Virus (RSV) Pregnant or age 60 yrs+ (1 - 1-dose 60+ series)          Wt Readings from Last 3 Encounters:   24 54 kg (119 lb)   24 52.6 kg (116 lb)   24 56.2 kg (123 lb 14.4 oz)        Temp Readings from Last 3 Encounters:   24 98.3 °F (36.8 °C) (Oral)   24 97.9 °F (36.6 °C) (Oral)   24 98.7 °F (37.1 °C)        BP Readings from Last 3 Encounters:   24 (!) 150/72   24 (!) 122/56   24 (!) 159/69        Pulse Readings from Last 3 Encounters:   24 65   24 68   24 92      [unfilled]       Learning Assessment:   :         2023     1:00 PM   [facility] AMB LEARNING ASSESSMENT   Primary Learner Patient   level of education DID NOT GRADUATE HIGH SCHOOL   Barriers Factors NONE   co-learner caregiver No   Primary Language ENGLISH   Learning Preference DEMONSTRATION   Answered By patient   Relationship to Learner SELF         Fall Risk Assessment:   :         2024     8:51 AM 2023    12:56 PM 2023     3:09 PM 2022     2:00 PM 2022     8:08 PM 2022     8:38 AM 2022     4:55 PM   Amb Fall Risk Assessment and TUG Test   Do you feel unsteady or are you worried about falling?  no no        2 or more falls in past year? no no        Fall with injury in past year? no no        Fall

## 2024-07-25 RX ORDER — ROSUVASTATIN CALCIUM 20 MG/1
20 TABLET, COATED ORAL NIGHTLY
Qty: 90 TABLET | Refills: 3 | Status: SHIPPED | OUTPATIENT
Start: 2024-07-25

## 2024-08-05 NOTE — TELEPHONE ENCOUNTER
Advised patient and transferred her to  to schedule appt.
Increase losartan to 25mg daily- have her follow up with me or Fan Mcwilliams in about 2 weeks
Patients blood pressure has been up and down last few weeks. Systolic BP has been running 647-473 and diastolic 74-92. Patient states she feels bad when her BP is up and wants to know if she needs to do anything. Please advise.
Pt stopped by because she was concerned because her bp is still higher than what it has been. It has been avg 150's/80-90's.  She wasn't sure if what she needed to if anything     If called back today please call her cell [phone]
details…

## 2024-09-25 ENCOUNTER — OFFICE VISIT (OUTPATIENT)
Age: 84
End: 2024-09-25

## 2024-09-25 VITALS
OXYGEN SATURATION: 100 % | TEMPERATURE: 98 F | HEART RATE: 63 BPM | SYSTOLIC BLOOD PRESSURE: 167 MMHG | WEIGHT: 120 LBS | BODY MASS INDEX: 21.26 KG/M2 | DIASTOLIC BLOOD PRESSURE: 75 MMHG

## 2024-09-25 DIAGNOSIS — S61.259A DOG BITE OF FINGER, INITIAL ENCOUNTER: Primary | ICD-10-CM
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
## 2024-09-25 RX ORDER — AMOXICILLIN AND CLAVULANATE POTASSIUM 500; 125 MG/1; MG/1
1 TABLET, FILM COATED ORAL 2 TIMES DAILY
Qty: 14 TABLET | Refills: 0 | Status: SHIPPED | OUTPATIENT
Start: 2024-09-25 | End: 2024-10-02

## 2024-10-29 ENCOUNTER — OFFICE VISIT (OUTPATIENT)
Facility: CLINIC | Age: 84
End: 2024-10-29

## 2024-10-29 VITALS
BODY MASS INDEX: 21.35 KG/M2 | RESPIRATION RATE: 12 BRPM | WEIGHT: 120.5 LBS | SYSTOLIC BLOOD PRESSURE: 148 MMHG | TEMPERATURE: 97.8 F | HEIGHT: 63 IN | HEART RATE: 68 BPM | DIASTOLIC BLOOD PRESSURE: 80 MMHG | OXYGEN SATURATION: 98 %

## 2024-10-29 DIAGNOSIS — J90 PLEURAL EFFUSION: ICD-10-CM

## 2024-10-29 DIAGNOSIS — N18.31 STAGE 3A CHRONIC KIDNEY DISEASE (HCC): ICD-10-CM

## 2024-10-29 DIAGNOSIS — I25.10 CORONARY ARTERY DISEASE INVOLVING NATIVE CORONARY ARTERY OF NATIVE HEART WITHOUT ANGINA PECTORIS: Primary | ICD-10-CM

## 2024-10-29 DIAGNOSIS — R00.2 HEART PALPITATIONS: ICD-10-CM

## 2024-10-29 DIAGNOSIS — I10 PRIMARY HYPERTENSION: ICD-10-CM

## 2024-10-29 DIAGNOSIS — K21.9 GASTROESOPHAGEAL REFLUX DISEASE, UNSPECIFIED WHETHER ESOPHAGITIS PRESENT: ICD-10-CM

## 2024-10-29 LAB
ANION GAP SERPL CALC-SCNC: 5 MMOL/L (ref 2–12)
BASOPHILS # BLD: 0 K/UL (ref 0–0.1)
BASOPHILS NFR BLD: 1 % (ref 0–1)
BUN SERPL-MCNC: 23 MG/DL (ref 6–20)
BUN/CREAT SERPL: 18 (ref 12–20)
CALCIUM SERPL-MCNC: 10.1 MG/DL (ref 8.5–10.1)
CHLORIDE SERPL-SCNC: 109 MMOL/L (ref 97–108)
CO2 SERPL-SCNC: 27 MMOL/L (ref 21–32)
CREAT SERPL-MCNC: 1.27 MG/DL (ref 0.55–1.02)
DIFFERENTIAL METHOD BLD: NORMAL
EOSINOPHIL # BLD: 0.3 K/UL (ref 0–0.4)
EOSINOPHIL NFR BLD: 6 % (ref 0–7)
ERYTHROCYTE [DISTWIDTH] IN BLOOD BY AUTOMATED COUNT: 13.3 % (ref 11.5–14.5)
GLUCOSE SERPL-MCNC: 112 MG/DL (ref 65–100)
HCT VFR BLD AUTO: 42.6 % (ref 35–47)
HGB BLD-MCNC: 13.6 G/DL (ref 11.5–16)
IMM GRANULOCYTES # BLD AUTO: 0 K/UL (ref 0–0.04)
IMM GRANULOCYTES NFR BLD AUTO: 0 % (ref 0–0.5)
LYMPHOCYTES # BLD: 1.3 K/UL (ref 0.8–3.5)
LYMPHOCYTES NFR BLD: 22 % (ref 12–49)
MCH RBC QN AUTO: 28.8 PG (ref 26–34)
MCHC RBC AUTO-ENTMCNC: 31.9 G/DL (ref 30–36.5)
MCV RBC AUTO: 90.3 FL (ref 80–99)
MONOCYTES # BLD: 0.6 K/UL (ref 0–1)
MONOCYTES NFR BLD: 10 % (ref 5–13)
NEUTS SEG # BLD: 3.5 K/UL (ref 1.8–8)
NEUTS SEG NFR BLD: 61 % (ref 32–75)
NRBC # BLD: 0 K/UL (ref 0–0.01)
NRBC BLD-RTO: 0 PER 100 WBC
PLATELET # BLD AUTO: 234 K/UL (ref 150–400)
PMV BLD AUTO: 10 FL (ref 8.9–12.9)
POTASSIUM SERPL-SCNC: 4.9 MMOL/L (ref 3.5–5.1)
RBC # BLD AUTO: 4.72 M/UL (ref 3.8–5.2)
SODIUM SERPL-SCNC: 141 MMOL/L (ref 136–145)
WBC # BLD AUTO: 5.7 K/UL (ref 3.6–11)

## 2024-10-29 SDOH — ECONOMIC STABILITY: FOOD INSECURITY: WITHIN THE PAST 12 MONTHS, THE FOOD YOU BOUGHT JUST DIDN'T LAST AND YOU DIDN'T HAVE MONEY TO GET MORE.: NEVER TRUE

## 2024-10-29 SDOH — ECONOMIC STABILITY: FOOD INSECURITY: WITHIN THE PAST 12 MONTHS, YOU WORRIED THAT YOUR FOOD WOULD RUN OUT BEFORE YOU GOT MONEY TO BUY MORE.: NEVER TRUE

## 2024-10-29 SDOH — ECONOMIC STABILITY: INCOME INSECURITY: HOW HARD IS IT FOR YOU TO PAY FOR THE VERY BASICS LIKE FOOD, HOUSING, MEDICAL CARE, AND HEATING?: NOT HARD AT ALL

## 2024-10-29 NOTE — PROGRESS NOTES
Micki Muñoz  Identified pt with two pt identifiers(name and ).     Chief Complaint   Patient presents with    Hypertension    Gastroesophageal Reflux       Reviewed record In preparation for visit and have obtained necessary documentation.     1. Have you been to the ER, urgent care clinic or hospitalized since your last visit? No     2. Have you seen or consulted any other health care providers outside of the [facility] since your last visit? Include any pap smears or colon screening. No    Vitals reviewed with provider.    Health Maintenance reviewed:     Health Maintenance Due   Topic    DEXA (modify frequency per FRAX score)     Respiratory Syncytial Virus (RSV) Pregnant or age 60 yrs+ (1 - 1-dose 60+ series)    Flu vaccine (1)    COVID-19 Vaccine ( season)          Wt Readings from Last 3 Encounters:   24 54.4 kg (120 lb)   24 54 kg (119 lb)   24 52.6 kg (116 lb)        Temp Readings from Last 3 Encounters:   24 98 °F (36.7 °C)   24 98.3 °F (36.8 °C) (Oral)   24 97.9 °F (36.6 °C) (Oral)        BP Readings from Last 3 Encounters:   24 (!) 167/75   24 130/60   24 (!) 122/56        Pulse Readings from Last 3 Encounters:   24 63   24 65   24 68      [unfilled]       Learning Assessment:   :         2023     1:00 PM   [facility] AMB LEARNING ASSESSMENT   Primary Learner Patient   level of education DID NOT GRADUATE HIGH SCHOOL   Barriers Factors NONE   co-learner caregiver No   Primary Language ENGLISH   Learning Preference DEMONSTRATION   Answered By patient   Relationship to Learner SELF         Fall Risk Assessment:   :         2024     8:51 AM 2023    12:56 PM 2023     3:09 PM 2022     2:00 PM 2022     8:08 PM 2022     8:38 AM 2022     4:55 PM   Amb Fall Risk Assessment and TUG Test   Do you feel unsteady or are you worried about falling?  no no        2 or more falls in past year? 
Normal S1/S2, irregular rhythm.  No murmurs, rubs, or gallops.  Pulmonary/Chest: Effort normal and breath sounds normal. No respiratory distress. No wheezes, rhonchi, or rales.   Ext: No edema.   Neurological: Alert.   Psychiatric: Normal mood and affect. Behavior is normal.     Lab Results   Component Value Date/Time     04/22/2024 09:05 AM    K 4.9 04/22/2024 09:05 AM     04/22/2024 09:05 AM    CO2 27 04/22/2024 09:05 AM    BUN 27 04/22/2024 09:05 AM    GFRAA >60 01/06/2022 05:20 AM    GLOB 3.0 04/22/2024 09:05 AM    ALT 27 04/22/2024 09:05 AM     No results found for: \"HBA1C\"   Lab Results   Component Value Date/Time    CHOL 173 04/22/2024 09:05 AM    HDL 75 04/22/2024 09:05 AM    LDL 81.2 04/22/2024 09:05 AM    VLDL 16.8 04/22/2024 09:05 AM    VLDL 16 04/19/2023 08:25 AM          ASSESSMENT/PLAN  Micki was seen today for hypertension and gastroesophageal reflux.    Diagnoses and all orders for this visit:    Coronary artery disease involving native coronary artery of native heart without angina pectoris    Primary hypertension  -     CBC with Auto Differential; Future  -     Basic Metabolic Panel; Future  -     Basic Metabolic Panel  -     CBC with Auto Differential    Gastroesophageal reflux disease, unspecified whether esophagitis present    Pleural effusion  -     XR CHEST (2 VIEWS); Future    Heart palpitations  -     Thyroid Cascade Profile; Future  -     Thyroid Cascade Profile    Stage 3a chronic kidney disease (HCC)      Lung exam normal - repeat CXR   GERD controlled overall with famotidine - continue  ?new diagnosis atrial fibrillation - encouraged speaking with cardiology about oac alternatives - has echo tomorrow - check thyroid labs  CAD on asa and plavix for life - managed by cardiology  BP better on repeat - better at home - continue current medications and follow up with cardiology as planned  CKD has been stable - check bmp today        Health Maintenance Due   Topic Date Due    SHAKIRA

## 2024-10-30 LAB — TSH SERPL DL<=0.05 MIU/L-ACNC: 2.36 UIU/ML (ref 0.45–4.5)

## 2024-11-25 RX ORDER — CARVEDILOL 12.5 MG/1
6.25 TABLET ORAL 2 TIMES DAILY WITH MEALS
Qty: 90 TABLET | Refills: 3 | Status: SHIPPED | OUTPATIENT
Start: 2024-11-25

## 2024-12-12 ENCOUNTER — HOSPITAL ENCOUNTER (OUTPATIENT)
Facility: HOSPITAL | Age: 84
Discharge: HOME | End: 2024-12-15
Payer: MEDICARE

## 2024-12-12 DIAGNOSIS — J90 PLEURAL EFFUSION: ICD-10-CM

## 2024-12-12 PROCEDURE — 71046 X-RAY EXAM CHEST 2 VIEWS: CPT

## 2025-02-06 RX ORDER — FAMOTIDINE 20 MG/1
TABLET, FILM COATED ORAL
Qty: 180 TABLET | Refills: 2 | Status: SHIPPED | OUTPATIENT
Start: 2025-02-06

## 2025-02-06 NOTE — TELEPHONE ENCOUNTER
PCP: Karyn Guillermo MD     Last appt: 10/29/2024    Future Appointments   Date Time Provider Department Center   5/5/2025  9:50 AM Karyn Guillermo MD [facility] DEP          Requested Prescriptions     Pending Prescriptions Disp Refills    famotidine (PEPCID) 20 MG tablet [Pharmacy Med Name: Famotidine Oral Tablet 20 MG] 180 tablet 2     Sig: TAKE 1 TABLET TWICE DAILY

## 2025-05-01 ENCOUNTER — TELEPHONE (OUTPATIENT)
Facility: CLINIC | Age: 85
End: 2025-05-01

## 2025-05-01 SDOH — HEALTH STABILITY: PHYSICAL HEALTH: ON AVERAGE, HOW MANY DAYS PER WEEK DO YOU ENGAGE IN MODERATE TO STRENUOUS EXERCISE (LIKE A BRISK WALK)?: 0 DAYS

## 2025-05-01 ASSESSMENT — PATIENT HEALTH QUESTIONNAIRE - PHQ9
SUM OF ALL RESPONSES TO PHQ QUESTIONS 1-9: 0
1. LITTLE INTEREST OR PLEASURE IN DOING THINGS: NOT AT ALL
SUM OF ALL RESPONSES TO PHQ QUESTIONS 1-9: 0
SUM OF ALL RESPONSES TO PHQ QUESTIONS 1-9: 0
2. FEELING DOWN, DEPRESSED OR HOPELESS: NOT AT ALL
SUM OF ALL RESPONSES TO PHQ QUESTIONS 1-9: 0

## 2025-05-01 ASSESSMENT — LIFESTYLE VARIABLES
HOW MANY STANDARD DRINKS CONTAINING ALCOHOL DO YOU HAVE ON A TYPICAL DAY: 0
HOW OFTEN DO YOU HAVE SIX OR MORE DRINKS ON ONE OCCASION: 1
HOW OFTEN DO YOU HAVE A DRINK CONTAINING ALCOHOL: 1
HOW OFTEN DO YOU HAVE A DRINK CONTAINING ALCOHOL: NEVER
HOW MANY STANDARD DRINKS CONTAINING ALCOHOL DO YOU HAVE ON A TYPICAL DAY: PATIENT DOES NOT DRINK

## 2025-05-05 ENCOUNTER — OFFICE VISIT (OUTPATIENT)
Facility: CLINIC | Age: 85
End: 2025-05-05
Payer: MEDICARE

## 2025-05-05 VITALS
TEMPERATURE: 98.1 F | BODY MASS INDEX: 21.62 KG/M2 | HEIGHT: 63 IN | SYSTOLIC BLOOD PRESSURE: 118 MMHG | OXYGEN SATURATION: 98 % | WEIGHT: 122 LBS | RESPIRATION RATE: 12 BRPM | HEART RATE: 58 BPM | DIASTOLIC BLOOD PRESSURE: 60 MMHG

## 2025-05-05 DIAGNOSIS — R73.01 IFG (IMPAIRED FASTING GLUCOSE): ICD-10-CM

## 2025-05-05 DIAGNOSIS — E78.2 MIXED HYPERLIPIDEMIA: ICD-10-CM

## 2025-05-05 DIAGNOSIS — I10 PRIMARY HYPERTENSION: ICD-10-CM

## 2025-05-05 DIAGNOSIS — N18.31 STAGE 3A CHRONIC KIDNEY DISEASE (HCC): ICD-10-CM

## 2025-05-05 DIAGNOSIS — K21.9 GASTROESOPHAGEAL REFLUX DISEASE, UNSPECIFIED WHETHER ESOPHAGITIS PRESENT: ICD-10-CM

## 2025-05-05 DIAGNOSIS — Z00.00 MEDICARE ANNUAL WELLNESS VISIT, SUBSEQUENT: Primary | ICD-10-CM

## 2025-05-05 DIAGNOSIS — I48.0 PAF (PAROXYSMAL ATRIAL FIBRILLATION) (HCC): ICD-10-CM

## 2025-05-05 PROCEDURE — 1123F ACP DISCUSS/DSCN MKR DOCD: CPT | Performed by: INTERNAL MEDICINE

## 2025-05-05 PROCEDURE — 3078F DIAST BP <80 MM HG: CPT | Performed by: INTERNAL MEDICINE

## 2025-05-05 PROCEDURE — G8400 PT W/DXA NO RESULTS DOC: HCPCS | Performed by: INTERNAL MEDICINE

## 2025-05-05 PROCEDURE — G8420 CALC BMI NORM PARAMETERS: HCPCS | Performed by: INTERNAL MEDICINE

## 2025-05-05 PROCEDURE — 99214 OFFICE O/P EST MOD 30 MIN: CPT | Performed by: INTERNAL MEDICINE

## 2025-05-05 PROCEDURE — 1160F RVW MEDS BY RX/DR IN RCRD: CPT | Performed by: INTERNAL MEDICINE

## 2025-05-05 PROCEDURE — 3074F SYST BP LT 130 MM HG: CPT | Performed by: INTERNAL MEDICINE

## 2025-05-05 PROCEDURE — 1090F PRES/ABSN URINE INCON ASSESS: CPT | Performed by: INTERNAL MEDICINE

## 2025-05-05 PROCEDURE — 1159F MED LIST DOCD IN RCRD: CPT | Performed by: INTERNAL MEDICINE

## 2025-05-05 PROCEDURE — G0439 PPPS, SUBSEQ VISIT: HCPCS | Performed by: INTERNAL MEDICINE

## 2025-05-05 PROCEDURE — 1036F TOBACCO NON-USER: CPT | Performed by: INTERNAL MEDICINE

## 2025-05-05 PROCEDURE — G8427 DOCREV CUR MEDS BY ELIG CLIN: HCPCS | Performed by: INTERNAL MEDICINE

## 2025-05-05 SDOH — ECONOMIC STABILITY: FOOD INSECURITY: WITHIN THE PAST 12 MONTHS, YOU WORRIED THAT YOUR FOOD WOULD RUN OUT BEFORE YOU GOT MONEY TO BUY MORE.: NEVER TRUE

## 2025-05-05 SDOH — ECONOMIC STABILITY: FOOD INSECURITY: WITHIN THE PAST 12 MONTHS, THE FOOD YOU BOUGHT JUST DIDN'T LAST AND YOU DIDN'T HAVE MONEY TO GET MORE.: NEVER TRUE

## 2025-05-05 NOTE — PROGRESS NOTES
Have you been to the ER, urgent care clinic since your last visit?  Hospitalized since your last visit?   NO    Have you seen or consulted any other health care providers outside our system since your last visit?   YES - When: approximately 1 months ago.  Where and Why: cardiology.           
material             Inactivity:  On average, how many days per week do you engage in moderate to strenuous exercise (like a brisk walk)?: (Proxy-Rptd) 0 days (!) Abnormal     Interventions:  See AVS for additional education material           Advanced Directives:  Do you have a Living Will?: (!) (Proxy-Rptd) No    Intervention:  has NO advanced directive - information provided  Prefers comfort care end of life                   Objective   Vitals:    05/05/25 1015 05/05/25 1039   BP: (!) 163/67 118/60   BP Site: Left Upper Arm    Patient Position: Sitting    Pulse: 58    Resp: 12    Temp: 98.1 °F (36.7 °C)    TempSrc: Oral    SpO2: 98%    Weight: 55.3 kg (122 lb)    Height: 1.6 m (5' 3\")       Body mass index is 21.61 kg/m².               /60   Pulse 58   Temp 98.1 °F (36.7 °C) (Oral)   Resp 12   Ht 1.6 m (5' 3\")   Wt 55.3 kg (122 lb)   SpO2 98%   BMI 21.61 kg/m²   Constitutional: Appears well-developed and well-nourished. No distress.   HENT:   Head: Normocephalic and atraumatic.   Eyes: No scleral icterus.   Neck: no lad, no tm, supple   Cardiovascular: Normal S1/S2, regular rhythm.  No murmurs, rubs, or gallops.  Pulmonary/Chest: Effort normal and breath sounds normal. No respiratory distress. No wheezes, rhonchi, or rales.   Abdomen: Soft, NT/ND, +BS, no rebound or guarding, no masses, no HSM appreciated.   Ext: No edema.   Neurological: Alert.   Psychiatric: Normal mood and affect. Behavior is normal.         Allergies   Allergen Reactions    Lisinopril Other (See Comments)     High K     Prior to Visit Medications    Medication Sig Taking? Authorizing Provider   apixaban (ELIQUIS) 2.5 MG TABS tablet Take 1 tablet by mouth 2 times daily Yes Provider, MD Casey   famotidine (PEPCID) 20 MG tablet TAKE 1 TABLET TWICE DAILY Yes aKryn Guillermo MD   carvedilol (COREG) 12.5 MG tablet Take 0.5 tablets by mouth with breakfast and with evening meal Yes Karyn Guillermo MD   rosuvastatin

## 2025-05-13 ENCOUNTER — LAB (OUTPATIENT)
Facility: CLINIC | Age: 85
End: 2025-05-13

## 2025-05-13 DIAGNOSIS — E78.2 MIXED HYPERLIPIDEMIA: ICD-10-CM

## 2025-05-13 DIAGNOSIS — R73.01 IFG (IMPAIRED FASTING GLUCOSE): ICD-10-CM

## 2025-05-13 DIAGNOSIS — N18.31 STAGE 3A CHRONIC KIDNEY DISEASE (HCC): ICD-10-CM

## 2025-05-13 LAB
ALBUMIN SERPL-MCNC: 4 G/DL (ref 3.5–5)
ALBUMIN/GLOB SERPL: 1.2 (ref 1.1–2.2)
ALP SERPL-CCNC: 119 U/L (ref 45–117)
ALT SERPL-CCNC: 35 U/L (ref 12–78)
ANION GAP SERPL CALC-SCNC: 2 MMOL/L (ref 2–12)
AST SERPL-CCNC: 35 U/L (ref 15–37)
BASOPHILS # BLD: 0.03 K/UL (ref 0–0.1)
BASOPHILS NFR BLD: 0.5 % (ref 0–1)
BILIRUB SERPL-MCNC: 0.7 MG/DL (ref 0.2–1)
BUN SERPL-MCNC: 28 MG/DL (ref 6–20)
BUN/CREAT SERPL: 26 (ref 12–20)
CALCIUM SERPL-MCNC: 9.7 MG/DL (ref 8.5–10.1)
CHLORIDE SERPL-SCNC: 108 MMOL/L (ref 97–108)
CHOLEST SERPL-MCNC: 193 MG/DL
CO2 SERPL-SCNC: 30 MMOL/L (ref 21–32)
CREAT SERPL-MCNC: 1.09 MG/DL (ref 0.55–1.02)
DIFFERENTIAL METHOD BLD: ABNORMAL
EOSINOPHIL # BLD: 0.51 K/UL (ref 0–0.4)
EOSINOPHIL NFR BLD: 9.2 % (ref 0–7)
ERYTHROCYTE [DISTWIDTH] IN BLOOD BY AUTOMATED COUNT: 13.9 % (ref 11.5–14.5)
EST. AVERAGE GLUCOSE BLD GHB EST-MCNC: 128 MG/DL
GLOBULIN SER CALC-MCNC: 3.4 G/DL (ref 2–4)
GLUCOSE SERPL-MCNC: 116 MG/DL (ref 65–100)
HBA1C MFR BLD: 6.1 % (ref 4–5.6)
HCT VFR BLD AUTO: 39.9 % (ref 35–47)
HDLC SERPL-MCNC: 79 MG/DL
HDLC SERPL: 2.4 (ref 0–5)
HGB BLD-MCNC: 12.9 G/DL (ref 11.5–16)
IMM GRANULOCYTES # BLD AUTO: 0.01 K/UL (ref 0–0.04)
IMM GRANULOCYTES NFR BLD AUTO: 0.2 % (ref 0–0.5)
LDLC SERPL CALC-MCNC: 98 MG/DL (ref 0–100)
LYMPHOCYTES # BLD: 1.23 K/UL (ref 0.8–3.5)
LYMPHOCYTES NFR BLD: 22.1 % (ref 12–49)
MCH RBC QN AUTO: 28.3 PG (ref 26–34)
MCHC RBC AUTO-ENTMCNC: 32.3 G/DL (ref 30–36.5)
MCV RBC AUTO: 87.5 FL (ref 80–99)
MONOCYTES # BLD: 0.54 K/UL (ref 0–1)
MONOCYTES NFR BLD: 9.7 % (ref 5–13)
NEUTS SEG # BLD: 3.25 K/UL (ref 1.8–8)
NEUTS SEG NFR BLD: 58.3 % (ref 32–75)
NRBC # BLD: 0 K/UL (ref 0–0.01)
NRBC BLD-RTO: 0 PER 100 WBC
PLATELET # BLD AUTO: 236 K/UL (ref 150–400)
PMV BLD AUTO: 10 FL (ref 8.9–12.9)
POTASSIUM SERPL-SCNC: 4.6 MMOL/L (ref 3.5–5.1)
PROT SERPL-MCNC: 7.4 G/DL (ref 6.4–8.2)
RBC # BLD AUTO: 4.56 M/UL (ref 3.8–5.2)
SODIUM SERPL-SCNC: 140 MMOL/L (ref 136–145)
TRIGL SERPL-MCNC: 80 MG/DL
VLDLC SERPL CALC-MCNC: 16 MG/DL
WBC # BLD AUTO: 5.6 K/UL (ref 3.6–11)

## 2025-05-14 RX ORDER — ROSUVASTATIN CALCIUM 20 MG/1
20 TABLET, COATED ORAL NIGHTLY
Qty: 90 TABLET | Refills: 3 | Status: SHIPPED | OUTPATIENT
Start: 2025-05-14

## 2025-05-14 NOTE — TELEPHONE ENCOUNTER
Future Appointments:  Future Appointments   Date Time Provider Department Center   11/5/2025  9:10 AM Karyn Guillermo MD [facility] DEP        Last Appointment With Me:  5/5/2025     Requested Prescriptions     Pending Prescriptions Disp Refills    rosuvastatin (CRESTOR) 20 MG tablet [Pharmacy Med Name: Rosuvastatin Calcium Oral Tablet 20 MG] 90 tablet 3     Sig: TAKE 1 TABLET EVERY NIGHT

## 2025-05-29 ENCOUNTER — RESULTS FOLLOW-UP (OUTPATIENT)
Facility: CLINIC | Age: 85
End: 2025-05-29